# Patient Record
Sex: FEMALE | Race: WHITE | NOT HISPANIC OR LATINO | Employment: OTHER | URBAN - METROPOLITAN AREA
[De-identification: names, ages, dates, MRNs, and addresses within clinical notes are randomized per-mention and may not be internally consistent; named-entity substitution may affect disease eponyms.]

---

## 2017-01-19 ENCOUNTER — HOSPITAL ENCOUNTER (OUTPATIENT)
Dept: LAB | Facility: MEDICAL CENTER | Age: 67
End: 2017-01-19
Attending: SPECIALIST
Payer: MEDICARE

## 2017-01-19 LAB
ALBUMIN SERPL BCP-MCNC: 4.6 G/DL (ref 3.2–4.9)
ALBUMIN/GLOB SERPL: 1.6 G/DL
ALP SERPL-CCNC: 56 U/L (ref 30–99)
ALT SERPL-CCNC: 30 U/L (ref 2–50)
ANION GAP SERPL CALC-SCNC: 7 MMOL/L (ref 0–11.9)
AST SERPL-CCNC: 21 U/L (ref 12–45)
BILIRUB SERPL-MCNC: 0.7 MG/DL (ref 0.1–1.5)
BUN SERPL-MCNC: 18 MG/DL (ref 8–22)
CALCIUM SERPL-MCNC: 10 MG/DL (ref 8.5–10.5)
CHLORIDE SERPL-SCNC: 106 MMOL/L (ref 96–112)
CO2 SERPL-SCNC: 26 MMOL/L (ref 20–33)
CREAT SERPL-MCNC: 1.12 MG/DL (ref 0.5–1.4)
GLOBULIN SER CALC-MCNC: 2.8 G/DL (ref 1.9–3.5)
GLUCOSE SERPL-MCNC: 81 MG/DL (ref 65–99)
POTASSIUM SERPL-SCNC: 4.4 MMOL/L (ref 3.6–5.5)
PROT SERPL-MCNC: 7.4 G/DL (ref 6–8.2)
SODIUM SERPL-SCNC: 139 MMOL/L (ref 135–145)

## 2017-01-19 PROCEDURE — 36415 COLL VENOUS BLD VENIPUNCTURE: CPT

## 2017-01-19 PROCEDURE — 80053 COMPREHEN METABOLIC PANEL: CPT

## 2017-01-30 ENCOUNTER — HOSPITAL ENCOUNTER (OUTPATIENT)
Dept: RADIOLOGY | Facility: MEDICAL CENTER | Age: 67
End: 2017-01-30
Attending: SPECIALIST
Payer: MEDICARE

## 2017-01-30 DIAGNOSIS — C50.911 MALIGNANT NEOPLASM OF RIGHT FEMALE BREAST, UNSPECIFIED SITE OF BREAST: ICD-10-CM

## 2017-01-30 PROCEDURE — 700117 HCHG RX CONTRAST REV CODE 255: Performed by: SPECIALIST

## 2017-01-30 PROCEDURE — A9577 INJ MULTIHANCE: HCPCS | Performed by: SPECIALIST

## 2017-01-30 PROCEDURE — 70553 MRI BRAIN STEM W/O & W/DYE: CPT

## 2017-01-30 RX ADMIN — GADOBENATE DIMEGLUMINE 8 ML: 529 INJECTION, SOLUTION INTRAVENOUS at 11:22

## 2017-02-28 RX ORDER — PRAVASTATIN SODIUM 20 MG
20 TABLET ORAL
Qty: 90 TAB | Refills: 0 | Status: SHIPPED | OUTPATIENT
Start: 2017-02-28 | End: 2017-08-08 | Stop reason: SDUPTHER

## 2017-02-28 NOTE — TELEPHONE ENCOUNTER
Refill done.  Patient needs to re-establish with a new PCP for further refills. Please have them schedule an appointment for a new PCP.  Marbin Webster M.D.

## 2017-02-28 NOTE — TELEPHONE ENCOUNTER
Was the patient seen in the last year in this department? Yes     Does patient have an active prescription for medications requested? No     Received Request Via: Pharmacy     Last seen: 08/17/2016 Alb[place]

## 2017-02-28 NOTE — Clinical Note
February 28, 2017        Lauren Jiang  [address] [place] [address]        Dear Lauren:    .This letter is to inform you we received a medication refill from your Pharmacy. We have refilled this medication. You will need to re-establish with another provider for future refills. Please contact our scheduling department at [phone] to schedule.       If you have any questions or concerns, please don't hesitate to call.        Sincerely,        REFUGIO Drake.    Electronically Signed

## 2017-03-17 DIAGNOSIS — I10 ESSENTIAL HYPERTENSION: ICD-10-CM

## 2017-03-17 RX ORDER — AMLODIPINE BESYLATE AND BENAZEPRIL HYDROCHLORIDE 5; 10 MG/1; MG/1
1 CAPSULE ORAL DAILY
Qty: 90 CAP | Refills: 0 | Status: SHIPPED | OUTPATIENT
Start: 2017-03-17 | End: 2017-03-28 | Stop reason: SDUPTHER

## 2017-03-17 NOTE — TELEPHONE ENCOUNTER
Was the patient seen in the last year in this department? Yes     Does patient have an active prescription for medications requested? No     Received Request Via: Pharmacy     Last seen: 08/17/2016 Albino     Pt contact to schedule with a New PCP

## 2017-03-28 ENCOUNTER — OFFICE VISIT (OUTPATIENT)
Dept: MEDICAL GROUP | Facility: LAB | Age: 67
End: 2017-03-28
Payer: MEDICARE

## 2017-03-28 VITALS
WEIGHT: 169.53 LBS | OXYGEN SATURATION: 97 % | HEART RATE: 80 BPM | DIASTOLIC BLOOD PRESSURE: 70 MMHG | BODY MASS INDEX: 28.25 KG/M2 | SYSTOLIC BLOOD PRESSURE: 112 MMHG | TEMPERATURE: 98.1 F | HEIGHT: 65 IN

## 2017-03-28 DIAGNOSIS — Z85.3 HISTORY OF BREAST CANCER: ICD-10-CM

## 2017-03-28 DIAGNOSIS — R94.4 DECREASED GFR: ICD-10-CM

## 2017-03-28 DIAGNOSIS — I10 ESSENTIAL HYPERTENSION: ICD-10-CM

## 2017-03-28 DIAGNOSIS — H40.10X0 OPEN-ANGLE GLAUCOMA OF RIGHT EYE, UNSPECIFIED GLAUCOMA STAGE, UNSPECIFIED OPEN-ANGLE GLAUCOMA TYPE: ICD-10-CM

## 2017-03-28 DIAGNOSIS — E78.5 HYPERLIPIDEMIA LDL GOAL <100: ICD-10-CM

## 2017-03-28 DIAGNOSIS — Z23 NEED FOR SHINGLES VACCINE: ICD-10-CM

## 2017-03-28 DIAGNOSIS — M85.80 OSTEOPENIA: ICD-10-CM

## 2017-03-28 DIAGNOSIS — R53.83 OTHER FATIGUE: ICD-10-CM

## 2017-03-28 PROCEDURE — 4040F PNEUMOC VAC/ADMIN/RCVD: CPT | Performed by: NURSE PRACTITIONER

## 2017-03-28 PROCEDURE — 1101F PT FALLS ASSESS-DOCD LE1/YR: CPT | Performed by: NURSE PRACTITIONER

## 2017-03-28 PROCEDURE — G8419 CALC BMI OUT NRM PARAM NOF/U: HCPCS | Performed by: NURSE PRACTITIONER

## 2017-03-28 PROCEDURE — 3014F SCREEN MAMMO DOC REV: CPT | Performed by: NURSE PRACTITIONER

## 2017-03-28 PROCEDURE — 1036F TOBACCO NON-USER: CPT | Performed by: NURSE PRACTITIONER

## 2017-03-28 PROCEDURE — 3017F COLORECTAL CA SCREEN DOC REV: CPT | Performed by: NURSE PRACTITIONER

## 2017-03-28 PROCEDURE — G8432 DEP SCR NOT DOC, RNG: HCPCS | Performed by: NURSE PRACTITIONER

## 2017-03-28 PROCEDURE — 99215 OFFICE O/P EST HI 40 MIN: CPT | Performed by: NURSE PRACTITIONER

## 2017-03-28 PROCEDURE — G8482 FLU IMMUNIZE ORDER/ADMIN: HCPCS | Performed by: NURSE PRACTITIONER

## 2017-03-28 RX ORDER — AMLODIPINE BESYLATE AND BENAZEPRIL HYDROCHLORIDE 5; 10 MG/1; MG/1
1 CAPSULE ORAL DAILY
Qty: 90 CAP | Refills: 3 | Status: SHIPPED | OUTPATIENT
Start: 2017-03-28 | End: 2018-04-04 | Stop reason: SDUPTHER

## 2017-03-28 ASSESSMENT — PATIENT HEALTH QUESTIONNAIRE - PHQ9: CLINICAL INTERPRETATION OF PHQ2 SCORE: 0

## 2017-03-28 ASSESSMENT — PAIN SCALES - GENERAL: PAINLEVEL: NO PAIN

## 2017-03-28 NOTE — ASSESSMENT & PLAN NOTE
Chronic issue.  Due for DEXA.  Taking calcium / vit D.    Last DEXA scan completed in September 2015.   Lumbar spine T score -2.3  Left hip T score -1.8  No broken bones since childhood.

## 2017-03-28 NOTE — ASSESSMENT & PLAN NOTE
Chronic issue.  Taking amlodipine / beazepril 5/10.  No hx of MI or stroke.  Denies leg swelling.

## 2017-03-28 NOTE — ASSESSMENT & PLAN NOTE
Component      Latest Ref Rng 9/19/2014 11/7/2015 5/13/2016 8/25/2016           7:25 AM 10:27 AM  9:44 AM 10:06 AM   GFR If African American      >60 mL/min/1.73 m 2 >60 >60 >60 57 (A)   GFR If Non African American      >60 mL/min/1.73 m 2 57 (A) >60 58 (A) 47 (A)     Component      Latest Ref Rng 9/28/2016 11/22/2016 1/19/2017           9:45 AM  9:24 AM 10:53 AM   GFR If African American      >60 mL/min/1.73 m 2 >60 >60 59 (A)   GFR If Non African American      >60 mL/min/1.73 m 2 >60 60 49 (A)     Mentions undx UTI a few years ago.  Abdominal US done and normal.  + pmhx HTN.      Component      Latest Ref Rng 11/7/2015 5/13/2016 8/25/2016 9/28/2016 11/22/2016          10:27 AM  9:44 AM 10:06 AM  9:45 AM  9:24 AM   Glucose      65 - 99 mg/dL 91 94 88 84 76   Bun      8 - 22 mg/dL 18 17 19 17 17   Creatinine      0.50 - 1.40 mg/dL 0.91 0.97 1.16 0.93 0.94   Calcium      8.5 - 10.5 mg/dL 10.4 10.4 10.4 9.5 9.9     Component      Latest Ref Rng 1/19/2017          10:53 AM   Glucose      65 - 99 mg/dL 81   Bun      8 - 22 mg/dL 18   Creatinine      0.50 - 1.40 mg/dL 1.12   Calcium      8.5 - 10.5 mg/dL 10.0

## 2017-03-28 NOTE — PROGRESS NOTES
Chief Complaint   Patient presents with   • Establish Care       HPI  Lauren is a 65 yo female here as a new patient in our office, previous pt of Nina Posdaa.  She has a medical history of osteopenia, hyperlipidemia, breast cancer, glaucoma, hypertension and a decreased GFR. No known chronic kidney disease. Rare use of NSAIDs. She is feeling well. She is also followed by general surgery and oncology. She occasionally sees dermatology but no one consistent. Also followed by ophthalmology, Dr. Garcia, regularly.  She is  with 2 grown children and one granddaughter. She is a retired nurse. She quit smoking 20 years ago. She drinks one glass of wine at night. She exercises by walking. She enjoys quilting.     #1-Osteopenia  Chronic issue.  Due for DEXA in the fall.  Taking calcium / vit D.    Last DEXA scan completed in September 2015.   Lumbar spine T score -2.3  Left hip T score -1.8  No broken bones since childhood.    #2-Hyperlipidemia LDL goal <100  Chronic issue.  Last LP was summer, 2016.  Taking pravastatin 20 mg nightly without side effects.  No hx of MI or stroke.  Had full cardiac w/u through [place] in her 50's - negative.    #3-History of breast cancer  Dx age 62, right breast . Followed by Dr. Jay and Britany yearly.  Hx of right mastectomy.  Currently taking anastrozole.      #4-Glaucoma  Chronic issue.  Followed by Dr. Garcia every 6 months.  No gtts.  Reports pressure to is stable.  Right eye only.      #5-Fatigue  Issue since starting anastozole.      #6-Essential hypertension  Chronic issue.  Taking amlodipine / beazepril 5/10.  No hx of MI or stroke.  Denies leg swelling.      #7-Decreased GFR  Component      Latest Ref Rng 9/19/2014 11/7/2015 5/13/2016 8/25/2016           7:25 AM 10:27 AM  9:44 AM 10:06 AM   GFR If African American      >60 mL/min/1.73 m 2 >60 >60 >60 57 (A)   GFR If Non African American      >60 mL/min/1.73 m 2 57 (A) >60 58 (A) 47 (A)     Component      Latest Ref Rng  "9/28/2016 11/22/2016 1/19/2017           9:45 AM  9:24 AM 10:53 AM   GFR If African American      >60 mL/min/1.73 m 2 >60 >60 59 (A)   GFR If Non African American      >60 mL/min/1.73 m 2 >60 60 49 (A)     Mentions undx UTI a few years ago.  Abdominal US done and normal.  + pmhx HTN.      Component      Latest Ref Rng 11/7/2015 5/13/2016 8/25/2016 9/28/2016 11/22/2016          10:27 AM  9:44 AM 10:06 AM  9:45 AM  9:24 AM   Glucose      65 - 99 mg/dL 91 94 88 84 76   Bun      8 - 22 mg/dL 18 17 19 17 17   Creatinine      0.50 - 1.40 mg/dL 0.91 0.97 1.16 0.93 0.94   Calcium      8.5 - 10.5 mg/dL 10.4 10.4 10.4 9.5 9.9     Component      Latest Ref Rng 1/19/2017          10:53 AM   Glucose      65 - 99 mg/dL 81   Bun      8 - 22 mg/dL 18   Creatinine      0.50 - 1.40 mg/dL 1.12   Calcium      8.5 - 10.5 mg/dL 10.0       Family History   Problem Relation Age of Onset   • Hypertension Father    • Heart Disease Father    • Cancer Sister      breast / possible uterine also (Dr. Mendoza)   • Hypertension Mother    • Lung Disease Mother      smoker   • Arthritis Mother      RA   • Diabetes Maternal Uncle      type I   • Cancer Maternal Grandmother      Colon   • Diabetes Maternal Grandfather      type II     Past Surgical History   Procedure Laterality Date   • Hysterectomy laparoscopy  51 y/o     cervix and bilat ovaries removed.   • Mastectomy  age 62     right mastectomy   • Tonsillectomy and adenoidectomy  5 y/o   • Knee arthroscopy  58 y/o     tripped over baby gate       ROS:   Denies nausea, vomiting, diarrhea, abdominal pain, chest pain, shortness of breath, leg swelling, unintentional weight loss, depression.    Exam:  Blood pressure 112/70, pulse 80, temperature 36.7 °C (98.1 °F), height 1.651 m (5' 5\"), weight 76.9 kg (169 lb 8.5 oz), SpO2 97 %.  Gen. appears healthy in no distress   Skin appropriate for sex and age   HEENT unremarkable   Neck no adenopathy, no nodules or masses palpable   Lungs clear   Heart regular "   Neuro gait and station normal   Psych appropriate      A/P:  1. History of breast cancer  -Followed closely by oncology and general surgery. Mammogram is due in May.    2. Osteopenia  -Currently taking calcium, vitamin D and performing weightbearing exercises. We'll recheck a bone density in the fall. Requested her medical records from [facility].    3. Hyperlipidemia LDL goal <100  -Recommend updated lab work this summer. Continue statin therapy, exercise and high fiber diet.  - LIPID PROFILE; Future    4. Open-angle glaucoma of right eye, unspecified glaucoma stage, unspecified open-angle glaucoma type  -Denies any eye issues today. Followed closely by Dr. Garcia.    5. Other fatigue  -Stable. Encourage daily exercise.    6. Essential hypertension  -Stable. Refilled her medication. Follow-up as needed.  - amlodipine-benazepril (LOTREL) 5-10 MG per capsule; Take 1 Cap by mouth every day.  Dispense: 90 Cap; Refill: 3    7. Decreased GFR  -Discussed likely from dehydration as BUN and creatinine have been normal. Encouraged high water intake prior to next lab work.    8. Need for shingles vaccine  - NON SPECIFIED; Zostivax  Dispense: 1 Each; Refill: 0    Total face to face time 40 minutes of which over 50% of this visit is spent in counseling, education and outlining a plan of treatment and coordination of care for the above conditions. This included but was not limited to discussion of medication options and potential risks related to the medications, referral and specialty care options. All patient questions were answered

## 2017-03-28 NOTE — Clinical Note
[facility]  LUCIANA HerPRaphaelN.  [address]  [place] [address]  Fax: [phone]   Authorization for Release/Disclosure of   Protected Health Information   Name: SHANAE MACIEL : 1950 SSN: [id number]   Address: [address] [place] [id number] Phone:    [phone] (home)    I authorize the entity listed below to release/disclose the PHI below to:   [facility]/GRACE Her. and CHIDI Her   Provider or Entity Name:  [facility]   Address   City, [facility]   Phone:      Fax:     Reason for request: continuity of care   Information to be released:    [  ] LAST COLONOSCOPY,  including any PATH REPORT and follow-up  [  ] LAST FIT/COLOGUARD RESULT [  ] LAST DEXA  [  ] LAST MAMMOGRAM  [  ] LAST PAP  [  ] LAST LABS [  ] RETINA EXAM REPORT  [  ] IMMUNIZATION RECORDS  [  ] Release all info      [  ] Check here and initial the line next to each item to release ALL health information INCLUDING  _____ Care and treatment for drug and / or alcohol abuse  _____ HIV testing, infection status, or AIDS  _____ Genetic Testing    DATES OF SERVICE OR TIME PERIOD TO BE DISCLOSED: _____________  I understand and acknowledge that:  * This Authorization may be revoked at any time by you in writing, except if your health information has already been used or disclosed.  * Your health information that will be used or disclosed as a result of you signing this authorization could be re-disclosed by the recipient. If this occurs, your re-disclosed health information may no longer be protected by State or Federal laws.  * You may refuse to sign this Authorization. Your refusal will not affect your ability to obtain treatment.  * This Authorization becomes effective upon signing and will  on (date) __________.      If no date is indicated, this Authorization will  one (1) year from the signature date.    Name: Shanae  Apolinar    Signature:   Date:     3/28/2017       PLEASE FAX REQUESTED RECORDS BACK TO: [phone]

## 2017-03-28 NOTE — ASSESSMENT & PLAN NOTE
Chronic issue.  Followed by Dr. Garcia every 6 months.  No gtts.  Reports pressure to is stable.  Right only.

## 2017-03-28 NOTE — ASSESSMENT & PLAN NOTE
Chronic issue.  Last LP was summer, 2016.  Taking pravastatin 20 mg nightly without side effects.  No hx of MI or stroke.  Had full cardiac w/u through [facility] in her 50's - negative.

## 2017-03-28 NOTE — ASSESSMENT & PLAN NOTE
Dx age 62, right breast . Followed by Dr. Jay and Britany yearly.  Hx of right mastectomy.  Currently taking anastrozole.

## 2017-05-23 ENCOUNTER — HOSPITAL ENCOUNTER (OUTPATIENT)
Dept: RADIOLOGY | Facility: MEDICAL CENTER | Age: 67
End: 2017-05-23
Attending: SURGERY
Payer: MEDICARE

## 2017-05-23 DIAGNOSIS — Z85.3 PERSONAL HISTORY OF MALIGNANT NEOPLASM OF BREAST: ICD-10-CM

## 2017-05-23 PROCEDURE — G0279 TOMOSYNTHESIS, MAMMO: HCPCS

## 2017-06-17 ENCOUNTER — PATIENT OUTREACH (OUTPATIENT)
Dept: HEALTH INFORMATION MANAGEMENT | Facility: OTHER | Age: 67
End: 2017-06-17

## 2017-08-08 RX ORDER — PRAVASTATIN SODIUM 20 MG
20 TABLET ORAL
Qty: 90 TAB | Refills: 2 | Status: SHIPPED | OUTPATIENT
Start: 2017-08-08 | End: 2018-08-09 | Stop reason: SDUPTHER

## 2017-08-16 ENCOUNTER — APPOINTMENT (OUTPATIENT)
Dept: RADIOLOGY | Facility: MEDICAL CENTER | Age: 67
End: 2017-08-16
Attending: HOSPITALIST
Payer: MEDICARE

## 2017-08-16 ENCOUNTER — HOSPITAL ENCOUNTER (OUTPATIENT)
Facility: MEDICAL CENTER | Age: 67
End: 2017-08-16
Attending: EMERGENCY MEDICINE | Admitting: INTERNAL MEDICINE
Payer: MEDICARE

## 2017-08-16 ENCOUNTER — RESOLUTE PROFESSIONAL BILLING HOSPITAL PROF FEE (OUTPATIENT)
Dept: HOSPITALIST | Facility: MEDICAL CENTER | Age: 67
End: 2017-08-16
Payer: MEDICARE

## 2017-08-16 ENCOUNTER — APPOINTMENT (OUTPATIENT)
Dept: RADIOLOGY | Facility: MEDICAL CENTER | Age: 67
End: 2017-08-16
Attending: EMERGENCY MEDICINE
Payer: MEDICARE

## 2017-08-16 VITALS
TEMPERATURE: 97.8 F | OXYGEN SATURATION: 95 % | HEIGHT: 65 IN | DIASTOLIC BLOOD PRESSURE: 78 MMHG | SYSTOLIC BLOOD PRESSURE: 130 MMHG | BODY MASS INDEX: 27.99 KG/M2 | WEIGHT: 167.99 LBS | RESPIRATION RATE: 18 BRPM | HEART RATE: 78 BPM

## 2017-08-16 DIAGNOSIS — R07.9 CHEST PAIN IN ADULT: ICD-10-CM

## 2017-08-16 PROBLEM — N18.2 CKD (CHRONIC KIDNEY DISEASE), STAGE II: Status: ACTIVE | Noted: 2017-08-16

## 2017-08-16 PROBLEM — R07.89 ATYPICAL CHEST PAIN: Status: ACTIVE | Noted: 2017-08-16

## 2017-08-16 PROBLEM — I10 HTN (HYPERTENSION): Status: ACTIVE | Noted: 2017-08-16

## 2017-08-16 PROBLEM — R07.89 ATYPICAL CHEST PAIN: Status: RESOLVED | Noted: 2017-08-16 | Resolved: 2017-08-16

## 2017-08-16 LAB
ALBUMIN SERPL BCP-MCNC: 4.5 G/DL (ref 3.2–4.9)
ALBUMIN/GLOB SERPL: 1.6 G/DL
ALP SERPL-CCNC: 68 U/L (ref 30–99)
ALT SERPL-CCNC: 73 U/L (ref 2–50)
ANION GAP SERPL CALC-SCNC: 10 MMOL/L (ref 0–11.9)
AST SERPL-CCNC: 43 U/L (ref 12–45)
BASOPHILS # BLD AUTO: 0.7 % (ref 0–1.8)
BASOPHILS # BLD: 0.05 K/UL (ref 0–0.12)
BILIRUB SERPL-MCNC: 0.5 MG/DL (ref 0.1–1.5)
BUN SERPL-MCNC: 20 MG/DL (ref 8–22)
CALCIUM SERPL-MCNC: 9.5 MG/DL (ref 8.4–10.2)
CHLORIDE SERPL-SCNC: 106 MMOL/L (ref 96–112)
CO2 SERPL-SCNC: 25 MMOL/L (ref 20–33)
CREAT SERPL-MCNC: 1.14 MG/DL (ref 0.5–1.4)
EKG IMPRESSION: NORMAL
EOSINOPHIL # BLD AUTO: 0.32 K/UL (ref 0–0.51)
EOSINOPHIL NFR BLD: 4.4 % (ref 0–6.9)
ERYTHROCYTE [DISTWIDTH] IN BLOOD BY AUTOMATED COUNT: 39.7 FL (ref 35.9–50)
GFR SERPL CREATININE-BSD FRML MDRD: 48 ML/MIN/1.73 M 2
GLOBULIN SER CALC-MCNC: 2.8 G/DL (ref 1.9–3.5)
GLUCOSE SERPL-MCNC: 105 MG/DL (ref 65–99)
HCT VFR BLD AUTO: 45.3 % (ref 37–47)
HGB BLD-MCNC: 15.1 G/DL (ref 12–16)
IMM GRANULOCYTES # BLD AUTO: 0.08 K/UL (ref 0–0.11)
IMM GRANULOCYTES NFR BLD AUTO: 1.1 % (ref 0–0.9)
LIPASE SERPL-CCNC: 35 U/L (ref 7–58)
LYMPHOCYTES # BLD AUTO: 1.68 K/UL (ref 1–4.8)
LYMPHOCYTES NFR BLD: 23.3 % (ref 22–41)
MCH RBC QN AUTO: 29.3 PG (ref 27–33)
MCHC RBC AUTO-ENTMCNC: 33.3 G/DL (ref 33.6–35)
MCV RBC AUTO: 88 FL (ref 81.4–97.8)
MONOCYTES # BLD AUTO: 0.64 K/UL (ref 0–0.85)
MONOCYTES NFR BLD AUTO: 8.9 % (ref 0–13.4)
NEUTROPHILS # BLD AUTO: 4.43 K/UL (ref 2–7.15)
NEUTROPHILS NFR BLD: 61.6 % (ref 44–72)
NRBC # BLD AUTO: 0 K/UL
NRBC BLD AUTO-RTO: 0 /100 WBC
PLATELET # BLD AUTO: 245 K/UL (ref 164–446)
PMV BLD AUTO: 8.8 FL (ref 9–12.9)
POTASSIUM SERPL-SCNC: 3.9 MMOL/L (ref 3.6–5.5)
PROT SERPL-MCNC: 7.3 G/DL (ref 6–8.2)
RBC # BLD AUTO: 5.15 M/UL (ref 4.2–5.4)
SODIUM SERPL-SCNC: 141 MMOL/L (ref 135–145)
TROPONIN I SERPL-MCNC: <0.02 NG/ML (ref 0–0.04)
TSH SERPL DL<=0.005 MIU/L-ACNC: 4.04 UIU/ML (ref 0.35–5.5)
WBC # BLD AUTO: 7.2 K/UL (ref 4.8–10.8)

## 2017-08-16 PROCEDURE — 83690 ASSAY OF LIPASE: CPT

## 2017-08-16 PROCEDURE — 84443 ASSAY THYROID STIM HORMONE: CPT

## 2017-08-16 PROCEDURE — 700111 HCHG RX REV CODE 636 W/ 250 OVERRIDE (IP)

## 2017-08-16 PROCEDURE — 700102 HCHG RX REV CODE 250 W/ 637 OVERRIDE(OP): Performed by: HOSPITALIST

## 2017-08-16 PROCEDURE — G0378 HOSPITAL OBSERVATION PER HR: HCPCS

## 2017-08-16 PROCEDURE — A9270 NON-COVERED ITEM OR SERVICE: HCPCS | Performed by: EMERGENCY MEDICINE

## 2017-08-16 PROCEDURE — 94760 N-INVAS EAR/PLS OXIMETRY 1: CPT

## 2017-08-16 PROCEDURE — A9270 NON-COVERED ITEM OR SERVICE: HCPCS | Performed by: HOSPITALIST

## 2017-08-16 PROCEDURE — 93005 ELECTROCARDIOGRAM TRACING: CPT | Performed by: EMERGENCY MEDICINE

## 2017-08-16 PROCEDURE — A9502 TC99M TETROFOSMIN: HCPCS

## 2017-08-16 PROCEDURE — 84484 ASSAY OF TROPONIN QUANT: CPT

## 2017-08-16 PROCEDURE — 700102 HCHG RX REV CODE 250 W/ 637 OVERRIDE(OP): Performed by: EMERGENCY MEDICINE

## 2017-08-16 PROCEDURE — 71020 DX-CHEST-2 VIEWS: CPT

## 2017-08-16 PROCEDURE — 85025 COMPLETE CBC W/AUTO DIFF WBC: CPT

## 2017-08-16 PROCEDURE — 80053 COMPREHEN METABOLIC PANEL: CPT

## 2017-08-16 PROCEDURE — 99236 HOSP IP/OBS SAME DATE HI 85: CPT | Performed by: INTERNAL MEDICINE

## 2017-08-16 PROCEDURE — 36415 COLL VENOUS BLD VENIPUNCTURE: CPT

## 2017-08-16 PROCEDURE — 99285 EMERGENCY DEPT VISIT HI MDM: CPT

## 2017-08-16 RX ORDER — M-VIT,TX,IRON,MINS/CALC/FOLIC 27MG-0.4MG
1 TABLET ORAL DAILY
COMMUNITY
End: 2019-08-14

## 2017-08-16 RX ORDER — PRAVASTATIN SODIUM 20 MG
20 TABLET ORAL
Status: DISCONTINUED | OUTPATIENT
Start: 2017-08-16 | End: 2017-08-16 | Stop reason: HOSPADM

## 2017-08-16 RX ORDER — AMLODIPINE BESYLATE AND BENAZEPRIL HYDROCHLORIDE 5; 10 MG/1; MG/1
1 CAPSULE ORAL DAILY
Status: DISCONTINUED | OUTPATIENT
Start: 2017-08-16 | End: 2017-08-16

## 2017-08-16 RX ORDER — AMLODIPINE BESYLATE 5 MG/1
5 TABLET ORAL
Status: DISCONTINUED | OUTPATIENT
Start: 2017-08-16 | End: 2017-08-16 | Stop reason: HOSPADM

## 2017-08-16 RX ORDER — NITROGLYCERIN 0.4 MG/1
0.4 TABLET SUBLINGUAL
Status: DISCONTINUED | OUTPATIENT
Start: 2017-08-16 | End: 2017-08-16 | Stop reason: HOSPADM

## 2017-08-16 RX ORDER — REGADENOSON 0.08 MG/ML
INJECTION, SOLUTION INTRAVENOUS
Status: COMPLETED
Start: 2017-08-16 | End: 2017-08-16

## 2017-08-16 RX ORDER — POLYETHYLENE GLYCOL 3350 17 G/17G
1 POWDER, FOR SOLUTION ORAL
Status: DISCONTINUED | OUTPATIENT
Start: 2017-08-16 | End: 2017-08-16 | Stop reason: HOSPADM

## 2017-08-16 RX ORDER — ASPIRIN 81 MG/1
324 TABLET, CHEWABLE ORAL ONCE
Status: COMPLETED | OUTPATIENT
Start: 2017-08-16 | End: 2017-08-16

## 2017-08-16 RX ORDER — SODIUM CHLORIDE 9 MG/ML
1000 INJECTION, SOLUTION INTRAVENOUS ONCE
Status: DISCONTINUED | OUTPATIENT
Start: 2017-08-16 | End: 2017-08-16 | Stop reason: HOSPADM

## 2017-08-16 RX ORDER — MORPHINE SULFATE 4 MG/ML
2-4 INJECTION, SOLUTION INTRAMUSCULAR; INTRAVENOUS
Status: DISCONTINUED | OUTPATIENT
Start: 2017-08-16 | End: 2017-08-16 | Stop reason: HOSPADM

## 2017-08-16 RX ORDER — BISACODYL 10 MG
10 SUPPOSITORY, RECTAL RECTAL
Status: DISCONTINUED | OUTPATIENT
Start: 2017-08-16 | End: 2017-08-16 | Stop reason: HOSPADM

## 2017-08-16 RX ORDER — ANASTROZOLE 1 MG/1
1 TABLET ORAL DAILY
Status: DISCONTINUED | OUTPATIENT
Start: 2017-08-16 | End: 2017-08-16 | Stop reason: HOSPADM

## 2017-08-16 RX ORDER — AMOXICILLIN 250 MG
2 CAPSULE ORAL 2 TIMES DAILY
Status: DISCONTINUED | OUTPATIENT
Start: 2017-08-16 | End: 2017-08-16 | Stop reason: HOSPADM

## 2017-08-16 RX ORDER — MULTIVIT-MIN/IRON/FOLIC ACID/K 18-600-40
1 CAPSULE ORAL EVERY EVENING
COMMUNITY
End: 2019-08-14

## 2017-08-16 RX ORDER — BENAZEPRIL HYDROCHLORIDE 10 MG/1
10 TABLET ORAL
Status: DISCONTINUED | OUTPATIENT
Start: 2017-08-16 | End: 2017-08-16 | Stop reason: HOSPADM

## 2017-08-16 RX ADMIN — DOCUSATE SODIUM AND SENNOSIDES 2 TABLET: 8.6; 5 TABLET, FILM COATED ORAL at 09:14

## 2017-08-16 RX ADMIN — ASPIRIN 81 MG 243 MG: 81 TABLET ORAL at 04:47

## 2017-08-16 RX ADMIN — BENAZEPRIL HYDROCHLORIDE 10 MG: 10 TABLET, FILM COATED ORAL at 09:14

## 2017-08-16 RX ADMIN — REGADENOSON 0.4 MG: 0.08 INJECTION, SOLUTION INTRAVENOUS at 12:28

## 2017-08-16 RX ADMIN — ANASTROZOLE 1 MG: 1 TABLET, COATED ORAL at 09:14

## 2017-08-16 RX ADMIN — AMLODIPINE BESYLATE 5 MG: 5 TABLET ORAL at 09:14

## 2017-08-16 ASSESSMENT — LIFESTYLE VARIABLES
EVER HAD A DRINK FIRST THING IN THE MORNING TO STEADY YOUR NERVES TO GET RID OF A HANGOVER: NO
EVER FELT BAD OR GUILTY ABOUT YOUR DRINKING: NO
HAVE PEOPLE ANNOYED YOU BY CRITICIZING YOUR DRINKING: NO
AVERAGE NUMBER OF DAYS PER WEEK YOU HAVE A DRINK CONTAINING ALCOHOL: 5
EVER_SMOKED: NEVER
HAVE YOU EVER FELT YOU SHOULD CUT DOWN ON YOUR DRINKING: NO
HOW MANY TIMES IN THE PAST YEAR HAVE YOU HAD 5 OR MORE DRINKS IN A DAY: 0
TOTAL SCORE: 0
ON A TYPICAL DAY WHEN YOU DRINK ALCOHOL HOW MANY DRINKS DO YOU HAVE: 1
TOTAL SCORE: 0
DO YOU DRINK ALCOHOL: YES
CONSUMPTION TOTAL: NEGATIVE
TOTAL SCORE: 0
EVER_SMOKED: YES

## 2017-08-16 ASSESSMENT — PATIENT HEALTH QUESTIONNAIRE - PHQ9
2. FEELING DOWN, DEPRESSED, IRRITABLE, OR HOPELESS: NOT AT ALL
1. LITTLE INTEREST OR PLEASURE IN DOING THINGS: NOT AT ALL
SUM OF ALL RESPONSES TO PHQ QUESTIONS 1-9: 0
SUM OF ALL RESPONSES TO PHQ9 QUESTIONS 1 AND 2: 0
2. FEELING DOWN, DEPRESSED, IRRITABLE, OR HOPELESS: NOT AT ALL
SUM OF ALL RESPONSES TO PHQ9 QUESTIONS 1 AND 2: 0
SUM OF ALL RESPONSES TO PHQ QUESTIONS 1-9: 0
1. LITTLE INTEREST OR PLEASURE IN DOING THINGS: NOT AT ALL

## 2017-08-16 ASSESSMENT — PAIN SCALES - GENERAL: PAINLEVEL_OUTOF10: 0

## 2017-08-16 NOTE — IP AVS SNAPSHOT
" Home Care Instructions                                                                                                                  Name:Lauren Jiang  Medical Record Number:[id number]  CSN: [id number]    YOB: 1950   Age: 67 y.o.  Sex: female  HT:1.651 m (5' 5\") WT: 76.2 kg (167 lb 15.9 oz)          Admit Date: 8/16/2017     Discharge Date:   Today's Date: 8/16/2017  Attending Doctor:  Luz Maria Portillo M.D.                  Allergies:  Review of patient's allergies indicates no known allergies.            Discharge Instructions       Discharge Instructions    Discharged to home by car with relative. Discharged via walking, hospital escort: Refused.  Special equipment needed: Not Applicable    Be sure to schedule a follow-up appointment with your primary care doctor or any specialists as instructed.     Discharge Plan:   Diet Plan: Discussed  Activity Level: Discussed  Confirmed Follow up Appointment: Appointment Scheduled  Confirmed Symptoms Management: Discussed  Medication Reconciliation Updated: Yes  Influenza Vaccine Indication: Not indicated: Previously immunized this influenza season and > 8 years of age    I understand that a diet low in cholesterol, fat, and sodium is recommended for good health. Unless I have been given specific instructions below for another diet, I accept this instruction as my diet prescription.   Other diet: regular    Special Instructions: None    · Is patient discharged on Warfarin / Coumadin?   No     · Is patient Post Blood Transfusion?  No    Chest Pain, Nonspecific  It is often hard to give a specific diagnosis for the cause of chest pain. There is always a chance that your pain could be related to something serious, like a heart attack or a blood clot in the lungs. You need to follow up with your caregiver for further evaluation. More lab tests or other studies such as X-rays, electrocardiography, stress testing, or cardiac imaging may be needed to find the cause of " your pain.  Most of the time, nonspecific chest pain improves within 2 to 3 days with rest and mild pain medicine. For the next few days, avoid physical exertion or activities that bring on pain. Do not smoke. Avoid drinking alcohol. Call your caregiver for routine follow-up as advised.   SEEK IMMEDIATE MEDICAL CARE IF:  · You develop increased chest pain or pain that radiates to the arm, neck, jaw, back, or abdomen.   · You develop shortness of breath, increased coughing, or you start coughing up blood.   · You have severe back or abdominal pain, nausea, or vomiting.   · You develop severe weakness, fainting, fever, or chills.   Document Released: 12/18/2006 Document Revised: 03/11/2013 Document Reviewed: 06/06/2008  [employer]® Patient Information ©2013 [employer].    Depression / Suicide Risk    As you are discharged from this [facility] facility, it is important to learn how to keep safe from harming yourself.    Recognize the warning signs:  · Abrupt changes in personality, positive or negative- including increase in energy   · Giving away possessions  · Change in eating patterns- significant weight changes-  positive or negative  · Change in sleeping patterns- unable to sleep or sleeping all the time   · Unwillingness or inability to communicate  · Depression  · Unusual sadness, discouragement and loneliness  · Talk of wanting to die  · Neglect of personal appearance   · Rebelliousness- reckless behavior  · Withdrawal from people/activities they love  · Confusion- inability to concentrate     If you or a loved one observes any of these behaviors or has concerns about self-harm, here's what you can do:  · Talk about it- your feelings and reasons for harming yourself  · Remove any means that you might use to hurt yourself (examples: pills, rope, extension cords, firearm)  · Get professional help from the community (Mental Health, Substance Abuse, psychological counseling)  · Do not be alone:Call your Safe  Contact- someone whom you trust who will be there for you.  · Call your local CRISIS HOTLINE 872-1143 or 589-342-9807  · Call your local Children's Mobile Crisis Response Team [place] (204) 432-7902 or www.[employer]  · Call the toll free National Suicide Prevention Hotlines   · National Suicide Prevention Lifeline 844-232-IVDQ (7984)  · Yes[employer] Line Network 800-SUICIDE (432-8747)        Your appointments     Aug 22, 2017  3:00 PM   Established Patient with SHERMAN Estrada   [facility]    [address]  [place] [address] [phone]           You will be receiving a confirmation call a few days before your appointment from our automated call confirmation system.                 Discharge Medication Instructions:    Below are the medications your physician expects you to take upon discharge:    Review all your home medications and newly ordered medications with your doctor and/or pharmacist. Follow medication instructions as directed by your doctor and/or pharmacist.    Please keep your medication list with you and share with your physician.               Medication List      CONTINUE taking these medications        Instructions    Morning Afternoon Evening Bedtime    amlodipine-benazepril 5-10 MG per capsule   Commonly known as:  LOTREL        Take 1 Cap by mouth every day.   Dose:  1 Cap                        anastrozole 1 MG Tabs   Last time this was given:  1 mg on 8/16/2017  9:14 AM   Commonly known as:  ARIMIDEX        Take 1 mg by mouth every day.   Dose:  1 mg                        aspirin EC 81 MG Tbec   Commonly known as:  ECOTRIN        Take 81 mg by mouth as needed (For chest pain).   Dose:  81 mg                        calcium carbonate 500 MG Tabs   Commonly known as:  OS-OMERO 500        Take 1,250 mg by mouth 2 times a day, with meals.   Dose:  1250 mg                        CO Q 10 PO        Take 1 Cap by mouth every bedtime.   Dose:  1  Cap                        pravastatin 20 MG Tabs   Commonly known as:  PRAVACHOL        Take 1 Tab by mouth every bedtime.   Dose:  20 mg                        therapeutic multivitamin-minerals Tabs        Take 1 Tab by mouth every day.   Dose:  1 Tab                        Vitamin D 2000 UNITS Caps        Take 1 Cap by mouth every evening.   Dose:  1 Cap                                Instructions           Diet / Nutrition:    Follow any diet instructions given to you by your doctor or the dietician, including how much salt (sodium) you are allowed each day.    If you are overweight, talk to your doctor about a weight reduction plan.    Activity:    Remain physically active following your doctor's instructions about exercise and activity.    Rest often.     Any time you become even a little tired or short of breath, SIT DOWN and rest.    Worsening Symptoms:    Report any of the following signs and symptoms to the doctor's office immediately:    *Pain of jaw, arm, or neck  *Chest pain not relieved by medication                               *Dizziness or loss of consciousness  *Difficulty breathing even when at rest   *More tired than usual                                       *Bleeding drainage or swelling of surgical site  *Swelling of feet, ankles, legs or stomach                 *Fever (>100ºF)  *Pink or blood tinged sputum  *Weight gain (3lbs/day or 5lbs /week)           *Shock from internal defibrillator (if applicable)  *Palpitations or irregular heartbeats                *Cool and/or numb extremities    Stroke Awareness    Common Risk Factors for Stroke include:    Age  Atrial Fibrillation  Carotid Artery Stenosis  Diabetes Mellitus  Excessive alcohol consumption  High blood pressure  Overweight   Physical inactivity  Smoking    Warning signs and symptoms of a stroke include:    *Sudden numbness or weakness of the face, arm or leg (especially on one side of the body).  *Sudden confusion, trouble speaking  or understanding.  *Sudden trouble seeing in one or both eyes.  *Sudden trouble walking, dizziness, loss of balance or coordination.Sudden severe headache with no known cause.    It is very important to get treatment quickly when a stroke occurs. If you experience any of the above warning signs, call 911 immediately.                   Disclaimer         Quit Smoking / Tobacco Use:    I understand the use of any tobacco products increases my chance of suffering from future heart disease or stroke and could cause other illnesses which may shorten my life. Quitting the use of tobacco products is the single most important thing I can do to improve my health. For further information on smoking / tobacco cessation call a Toll Free Quit Line at 1-941.951.2190 (*National Cancer [place]) or 1-356.774.1157 (American Lung Association) or you can access the web based program at www.lungusa.org.    [place] Tobacco Users Help Line:  (687) 291-4310       Toll Free: 1-784.233.4021  Quit Tobacco Program [facility] Management Services (357)366-6308    Crisis Hotline:    [place] Crisis Hotline:  4-838-QNAUELZ or 1-855.861.9338    [place] Crisis Hotline:    1-738.125.6778 or 082-863-7797    Discharge Survey:   Thank you for choosing [facility]. We hope we did everything we could to make your hospital stay a pleasant one. You may be receiving a phone survey and we would appreciate your time and participation in answering the questions. Your input is very valuable to us in our efforts to improve our service to our patients and their families.        My signature on this form indicates that:    1. I have reviewed and understand the above information.  2. My questions regarding this information have been answered to my satisfaction.  3. I have formulated a plan with my discharge nurse to obtain my prescribed medications for home.                  Disclaimer         __________________________________                     __________        ________                       Patient Signature                                                 Date                    Time

## 2017-08-16 NOTE — IP AVS SNAPSHOT
8/16/2017    Lauren Jiang  [address] [address]    Dear Lauren:    Critical access hospital wants to ensure your discharge home is safe and you or your loved ones have had all of your questions answered regarding your care after you leave the hospital.    Below is a list of resources and contact information should you have any questions regarding your hospital stay, follow-up instructions, or active medical symptoms.    Questions or Concerns Regarding… Contact   Medical Questions Related to Your Discharge  (7 days a week, 8am-5pm) Contact a Nurse Care Coordinator   [phone]   Medical Questions Not Related to Your Discharge  (24 hours a day / 7 days a week)  Contact the Nurse Health Line   [phone]    Medications or Discharge Instructions Refer to your discharge packet   or contact your [facility] Primary Care Provider   [phone]   Follow-up Appointment(s) Schedule your appointment via [employer]   or contact Scheduling [phone]   Billing Review your statement via [employer]  or contact Billing [phone]   Medical Records Review your records via [employer]   or contact Medical Records [phone]     You may receive a telephone call within two days of discharge. This call is to make certain you understand your discharge instructions and have the opportunity to have any questions answered. You can also easily access your medical information, test results and upcoming appointments via the [employer] free online health management tool. You can learn more and sign up at [employer]. For assistance setting up your [employer] account, please call [phone].    Once again, we want to ensure your discharge home is safe and that you have a clear understanding of any next steps in your care. If you have any questions or concerns, please do not hesitate to contact us, we are here for you. Thank you for choosing [facility] for your healthcare needs.    Sincerely,    Your [facility] Healthcare Team

## 2017-08-16 NOTE — PROGRESS NOTES
Patient stable, has been sitting on chair, no complaints or concerns. Ready for discharge, waiting for her .

## 2017-08-16 NOTE — ED PROVIDER NOTES
"ED Provider Note    CHIEF COMPLAINT  Chief Complaint   Patient presents with   • Chest Pain     burning/heaviness, left sided        [facility]    Primary care provider: CHIDI Her   History obtained from: Pt   History limited by: None     Lauren Jiang is a 67 y.o. female who presents to the ED complaining of left sided chest pain that woke her up from sleep at around 3:00 this morning. She describes the pain as heaviness and burning. She tried getting up and moving around without any improvement. She did take a baby aspirin. She denies any associated shortness of breath/nausea/vomiting/dizziness or lightheadedness. She reports hot flashes due to her breast cancer treatment so is unsure if her diaphoresis was due to that or due to the chest pain. She denies pain anywhere else or any other symptoms. She reports similar episodes that last up to 10 minutes for which she has never sought medical treatment. Tonight the symptoms were pretty severe and did not resolve so she came to the ED. Patient reports that her symptoms appear to be improving while in the ED. She denies any known history of cardiac disease. She reports that she had a cardiac catheterization about 16 years ago at which time it was \"all clear.\" Patient reports that her father had a heart attack. 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    REVIEW OF SYSTEMS  Please see HPI for pertinent positives/negatives.  All other systems reviewed and are negative.     PAST MEDICAL HISTORY  Past Medical History   Diagnosis Date   • Cancer (CMS-HCC)      Breast x 2 1/2 years    • Hypertension    • Hyperlipidemia    • Gallstones    • Glaucoma      right eye        SURGICAL HISTORY  Past Surgical History   Procedure Laterality Date   • Hysterectomy laparoscopy  51 y/o     cervix and bilat ovaries removed.   • Mastectomy  age 62     right mastectomy   • Tonsillectomy and adenoidectomy  7 y/o   • Knee arthroscopy  58 y/o     tripped over baby gate    " "    SOCIAL HISTORY  Social History     Social History Main Topics   • Smoking status: Former Smoker -- 0.25 packs/day for 10 years   • Smokeless tobacco: Former User     Quit date: 03/28/1993   • Alcohol Use: 4.2 oz/week     7 Glasses of wine per week   • Drug Use: No   • Sexual Activity:     Partners: Male      Comment: ; retired RN; two kids; one grandchild        FAMILY HISTORY  Family History   Problem Relation Age of Onset   • Hypertension Father    • Heart Disease Father    • Cancer Sister      breast / possible uterine also (Dr. Mendoza)   • Hypertension Mother    • Lung Disease Mother      smoker   • Arthritis Mother      RA   • Diabetes Maternal Uncle      type I   • Cancer Maternal Grandmother      Colon   • Diabetes Maternal Grandfather      type II        CURRENT MEDICATIONS  Home Medications     Reviewed by Beth Mendoza R.N. (Registered Nurse) on 08/16/17 at 3005  Med List Status: Complete    Medication Last Dose Status    amlodipine-benazepril (LOTREL) 5-10 MG per capsule  Active    anastrozole (ARIMIDEX) 1 MG Tab 8/30/2016 Active    calcium carbonate (OS-OMERO 500) 1250 MG Tab 8/30/2016 Active    Coenzyme Q10 (CO Q 10 PO) 8/30/2016 Active    NON SPECIFIED  Active    pravastatin (PRAVACHOL) 20 MG Tab  Active    vitamin D (CHOLECALCIFEROL) 1000 UNIT Tab 8/30/2016 Active                 ALLERGIES  No Known Allergies     PHYSICAL EXAM  VITAL SIGNS: /87 mmHg  Pulse 77  Temp(Src) 36.3 °C (97.4 °F)  Resp 15  Ht 1.651 m (5' 5\")  Wt 76.2 kg (167 lb 15.9 oz)  BMI 27.96 kg/m2  SpO2 96% @LINETTE[200176::@     Pulse ox interpretation: 97% I interpret this pulse ox as normal     Constitutional: Well developed, well nourished, alert in no apparent distress, nontoxic appearance    HENT: No external signs of trauma, normocephalic, bilateral external ears normal, oropharynx moist and clear, nose normal    Eyes: PERRL, conjunctiva without erythema, no discharge, no icterus    Neck: Soft and supple, " trachea midline, no stridor, no tenderness, no LAD, no JVD, good ROM    Cardiovascular: Regular rate and rhythm, no murmurs/rubs/gallops, strong distal pulses and good perfusion    Thorax & Lungs: No respiratory distress, CTAB, no chest tenderness    Abdomen: Soft, nontender, nondistended, no guarding, no rebound, normal BS    Extremities: No clubbing, no cyanosis, no edema, no gross deformity, good ROM, no tenderness, intact distal pulses with brisk cap refill    Skin: Warm, dry, no pallor/cyanosis, no rash noted    Lymphatic: No lymphadenopathy noted    Neuro: A/O times 3, no focal deficits noted    Psychiatric: Cooperative, normal mood and affect, normal judgement, appropriate for clinical situation          DIAGNOSTIC STUDIES / PROCEDURES    EKG  12 Lead EKG obtained at 0439 and interpreted by me:   Rate: 90   Rhythm: Sinus rhythm   Ectopy: None  Intervals: Normal   Q Waves: None   QRS: Normal   ST segments: Minimal ST depression lateral leads  T Waves: Normal     Clinical Impression: Sinus rhythm with nonspecific ST changes       LABS  All labs reviewed by me.     Results for orders placed or performed during the hospital encounter of 08/16/17   CBC WITH DIFFERENTIAL   Result Value Ref Range    WBC 7.2 4.8 - 10.8 K/uL    RBC 5.15 4.20 - 5.40 M/uL    Hemoglobin 15.1 12.0 - 16.0 g/dL    Hematocrit 45.3 37.0 - 47.0 %    MCV 88.0 81.4 - 97.8 fL    MCH 29.3 27.0 - 33.0 pg    MCHC 33.3 (L) 33.6 - 35.0 g/dL    RDW 39.7 35.9 - 50.0 fL    Platelet Count 245 164 - 446 K/uL    MPV 8.8 (L) 9.0 - 12.9 fL    Neutrophils-Polys 61.60 44.00 - 72.00 %    Lymphocytes 23.30 22.00 - 41.00 %    Monocytes 8.90 0.00 - 13.40 %    Eosinophils 4.40 0.00 - 6.90 %    Basophils 0.70 0.00 - 1.80 %    Immature Granulocytes 1.10 (H) 0.00 - 0.90 %    Nucleated RBC 0.00 /100 WBC    Neutrophils (Absolute) 4.43 2.00 - 7.15 K/uL    Lymphs (Absolute) 1.68 1.00 - 4.80 K/uL    Monos (Absolute) 0.64 0.00 - 0.85 K/uL    Eos (Absolute) 0.32 0.00 - 0.51  K/uL    Baso (Absolute) 0.05 0.00 - 0.12 K/uL    Immature Granulocytes (abs) 0.08 0.00 - 0.11 K/uL    NRBC (Absolute) 0.00 K/uL   COMP METABOLIC PANEL   Result Value Ref Range    Sodium 141 135 - 145 mmol/L    Potassium 3.9 3.6 - 5.5 mmol/L    Chloride 106 96 - 112 mmol/L    Co2 25 20 - 33 mmol/L    Anion Gap 10.0 0.0 - 11.9    Glucose 105 (H) 65 - 99 mg/dL    Bun 20 8 - 22 mg/dL    Creatinine 1.14 0.50 - 1.40 mg/dL    Calcium 9.5 8.4 - 10.2 mg/dL    AST(SGOT) 43 12 - 45 U/L    ALT(SGPT) 73 (H) 2 - 50 U/L    Alkaline Phosphatase 68 30 - 99 U/L    Total Bilirubin 0.5 0.1 - 1.5 mg/dL    Albumin 4.5 3.2 - 4.9 g/dL    Total Protein 7.3 6.0 - 8.2 g/dL    Globulin 2.8 1.9 - 3.5 g/dL    A-G Ratio 1.6 g/dL   LIPASE   Result Value Ref Range    Lipase 35 7 - 58 U/L   TROPONIN   Result Value Ref Range    Troponin I <0.02 0.00 - 0.04 ng/mL   ESTIMATED GFR   Result Value Ref Range    GFR If  58 (A) >60 mL/min/1.73 m 2    GFR If Non  48 (A) >60 mL/min/1.73 m 2   EKG (NOW)   Result Value Ref Range    Report       [facility] Emergency Dept[facility]    Test Date:  2017  Pt Name:    SHANAE MACIEL                Department: [facility]  MRN:        [id number]                      Room:  Gender:     F                            Technician: VANESSA  :        1950                   Requested By:YADIRA RINCON  Order #:    [id number]                    Reading MD:    Measurements  Intervals                                Axis  Rate:       90                           P:          65  SC:         171                          QRS:        -19  QRSD:       95                           T:          35  QT:         363  QTc:        444    Interpretive Statements  Sinus rhythm  Borderline left axis deviation  RSR' in V1 or V2, probably normal variant  No previous ECG available for comparison          RADIOLOGY  The radiologist's interpretation of all radiological studies have been reviewed by me.      DX-CHEST-2 VIEWS   Final Result         1. No active cardiopulmonary abnormalities are identified.             COURSE & MEDICAL DECISION MAKING  Nursing notes, VS, PMSFHx reviewed in chart.     Review of past medical records shows the patient MRI of the brain on January 30, 2017 without acute findings.    Differential diagnoses considered include but are not limited to: AMI, dissection, PE, pneumothorax, pneumomediastinum, CHF/pulm edema, pericardial effusion/tamponade, pericarditis, pneumonia, pleural effusion, pleurisy, costochondritis, mediastinitis, esophageal rupture, esophageal spasm, GERD, gastritis, PUD, hiatal hernia, pancreatitis, cholecystitis/ascending cholangitis, gallstone/biliary colic, herpes zoster, muscle strain, neuropathy    HEART Score: 6    HISTORY  Highly suspicious +2  Moderately suspicious +1  Slightly suspicious 0    EKG  Significant ST depression +2  Nonspecific repolarization disturbance +1  Normal 0    AGE  ? 65 +2  45-65 +1  < 45 0    RISK FACTORS  Hypercholesterolemia, HTN, DM, Cigarette smoking, positive family history, obesity  ? 3 risk factors or history of atherosclerotic disease +2  1-2 risk factors +1  No risk factors known 0    TROPONIN  ? 3× normal limit +2  1-3× normal limit +1  ? normal limit 0     Patient presents to ED with above complaint. EKG did not show any acute ischemic changes and chest x-ray and labs were essentially unremarkable. Patient reports that her symptoms have resolved while in the ED and is now resting comfortably in no acute distress and nontoxic in appearance. Findings discussed with the patient. Patient agrees to plan for admission.    0555: D/W Dr. Portillo, hospitalist, who agreed to admit pt for further care.        FINAL IMPRESSION  1. Chest pain in adult           DISPOSITION  Patient will be admitted to hospitalist.      Electronically signed by: Marco Davis, 8/16/2017 4:46 AM      Portions of this record were made with voice recognition  software.  Despite my review, spelling/grammar/context errors may still remain.  Interpretation of this chart should be taken in this context.

## 2017-08-16 NOTE — PROGRESS NOTES
Bedside report received from Maci KRISHNAMURTHY @ 0700. Assumed care w/ Lilia KRISHNAMURTHY. POC discussed. Pt resting comfortably in bed. Safety precautions in place.    No changes in pt status throughout the day. No c/o chest pain. troponins and EKG's negative.

## 2017-08-16 NOTE — CARE PLAN
Problem: Communication  Goal: The ability to communicate needs accurately and effectively will improve  Intervention: Elizabeth patient and significant other/support system to call light to alert staff of needs  Patient oriented and communicated regarding labs and procedures done. She understands and agreed plan of care.

## 2017-08-16 NOTE — IP AVS SNAPSHOT
[employer] Access Code: Activation code not generated  Current [employer] Status: Patient Declined    Your email address is not on file at [facility] [employer].  Email Addresses are required for you to sign up for [employer], please contact [phone] to verify your personal information and to provide your email address prior to attempting to register for [employer].    Lauren Jiang  [address] [place] [address]    [employer]  A secure, online tool to manage your health information     [employer]’s [employer]® is a secure, online tool that connects you to your personalized health information from the privacy of your home -- day or night - making it very easy for you to manage your healthcare. Once the activation process is completed, you can even access your medical information using the [employer] mike, which is available for free in the Apple Mike store or Google Play store.     To learn more about [employer], visit [email or web address]    There are two levels of access available (as shown below):   My Chart Features  [facility] Primary Care Doctor [facility]  Specialists [facility]  Urgent  Care Non-[facility] Primary Care Doctor   Email your healthcare team securely and privately 24/7 X X X    Manage appointments: schedule your next appointment; view details of past/upcoming appointments X      Request prescription refills. X      View recent personal medical records, including lab and immunizations X X X X   View health record, including health history, allergies, medications X X X X   Read reports about your outpatient visits, procedures, consult and ER notes X X X X   See your discharge summary, which is a recap of your hospital and/or ER visit that includes your diagnosis, lab results, and care plan X X  X     How to register for [employer]:  Once your e-mail address has been verified, follow the following steps to sign up for [employer].     1. Go to  [email or web address]  2. Click on the Sign Up Now box, which takes you to the  New Member Sign Up page. You will need to provide the following information:  a. Enter your [employer] Access Code exactly as it appears at the top of this page. (You will not need to use this code after you’ve completed the sign-up process. If you do not sign up before the expiration date, you must request a new code.)   b. Enter your date of birth.   c. Enter your home email address.   d. Click Submit, and follow the next screen’s instructions.  3. Create a [employer] ID. This will be your [employer] login ID and cannot be changed, so think of one that is secure and easy to remember.  4. Create a [employer] password. You can change your password at any time.  5. Enter your Password Reset Question and Answer. This can be used at a later time if you forget your password.   6. Enter your e-mail address. This allows you to receive e-mail notifications when new information is available in [employer].  7. Click Sign Up. You can now view your health information.    For assistance activating your [employer] account, call [phone]

## 2017-08-16 NOTE — PROGRESS NOTES
Tele strip at 0740 shows SR w/ HR of 77  Measurements: .20/.08/.38    Tele Shift Summary:  Rhythm: SR  Rate: 60's-70s  Ectopy: Per CCT Lucia, pt had occasional PVC's.    Telemetry monitoring strips placed in pt chart.

## 2017-08-16 NOTE — PROGRESS NOTES
"          Nursing care plan includes knowledge deficit, potential for discomfort, potential for compromised cardiac output.  POC includes teaching, comfort measures and reassurance, and access to code cart, cardiology stand by and availability of rapid response team.  Pt verbalizes good understanding of benefits and risks of pharmacological cardiac stress test.  Informed consent obtained.  Lexiscan given, pt developed the following signs/symptoms:\"taste\".    VS stable, symptoms resolved.  To waiting room, fluids and/or snack given, awaiting second scan.  Nursing goals met.   "

## 2017-08-16 NOTE — CARE PLAN
Problem: Safety  Goal: Will remain free from injury  Intervention: Provide assistance with mobility  Patient educated on calling for assistance when needed. Call light within reach, frequently checking on patient needs and concerns.

## 2017-08-16 NOTE — PROGRESS NOTES
1320 Patient is back from stress test, states feeling well and would like to go home today.  She is sitting on a chair, eating without further needs.

## 2017-08-16 NOTE — DISCHARGE INSTRUCTIONS
Discharge Instructions    Discharged to home by car with relative. Discharged via walking, hospital escort: Refused.  Special equipment needed: Not Applicable    Be sure to schedule a follow-up appointment with your primary care doctor or any specialists as instructed.     Discharge Plan:   Diet Plan: Discussed  Activity Level: Discussed  Confirmed Follow up Appointment: Appointment Scheduled  Confirmed Symptoms Management: Discussed  Medication Reconciliation Updated: Yes  Influenza Vaccine Indication: Not indicated: Previously immunized this influenza season and > 8 years of age    I understand that a diet low in cholesterol, fat, and sodium is recommended for good health. Unless I have been given specific instructions below for another diet, I accept this instruction as my diet prescription.   Other diet: regular    Special Instructions: None    · Is patient discharged on Warfarin / Coumadin?   No     · Is patient Post Blood Transfusion?  No    Chest Pain, Nonspecific  It is often hard to give a specific diagnosis for the cause of chest pain. There is always a chance that your pain could be related to something serious, like a heart attack or a blood clot in the lungs. You need to follow up with your caregiver for further evaluation. More lab tests or other studies such as X-rays, electrocardiography, stress testing, or cardiac imaging may be needed to find the cause of your pain.  Most of the time, nonspecific chest pain improves within 2 to 3 days with rest and mild pain medicine. For the next few days, avoid physical exertion or activities that bring on pain. Do not smoke. Avoid drinking alcohol. Call your caregiver for routine follow-up as advised.   SEEK IMMEDIATE MEDICAL CARE IF:  · You develop increased chest pain or pain that radiates to the arm, neck, jaw, back, or abdomen.   · You develop shortness of breath, increased coughing, or you start coughing up blood.   · You have severe back or abdominal pain,  nausea, or vomiting.   · You develop severe weakness, fainting, fever, or chills.   Document Released: 12/18/2006 Document Revised: 03/11/2013 Document Reviewed: 06/06/2008  ExitCare® Patient Information ©2013 [employer].    Depression / Suicide Risk    As you are discharged from this [facility] facility, it is important to learn how to keep safe from harming yourself.    Recognize the warning signs:  · Abrupt changes in personality, positive or negative- including increase in energy   · Giving away possessions  · Change in eating patterns- significant weight changes-  positive or negative  · Change in sleeping patterns- unable to sleep or sleeping all the time   · Unwillingness or inability to communicate  · Depression  · Unusual sadness, discouragement and loneliness  · Talk of wanting to die  · Neglect of personal appearance   · Rebelliousness- reckless behavior  · Withdrawal from people/activities they love  · Confusion- inability to concentrate     If you or a loved one observes any of these behaviors or has concerns about self-harm, here's what you can do:  · Talk about it- your feelings and reasons for harming yourself  · Remove any means that you might use to hurt yourself (examples: pills, rope, extension cords, firearm)  · Get professional help from the community (Mental Health, Substance Abuse, psychological counseling)  · Do not be alone:Call your Safe Contact- someone whom you trust who will be there for you.  · Call your local CRISIS HOTLINE 864-2383 or 817-746-4684  · Call your local Children's Mobile Crisis Response Team [place] (307) 824-9752 or www.IMedExchange  · Call the toll free National Suicide Prevention Hotlines   · National Suicide Prevention Lifeline 958-350-TRHO (5693)  · National Hope Line Network 800-SUICIDE (221-7863)

## 2017-08-16 NOTE — H&P
CHIEF COMPLAINT:  Chest pain.    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  She has had a history of   reflux disease and was on Prilosec for a while, but has gotten off the   medication since 2 years ago and has had no issues since then.    She was doing well until around 3:00 a.m. after she was awakened by chest pain   on the left precordium, which she described as burning pressure, rated 7/10   in severity, and was waxing and waning, without any radiation.  It did not   have any associated shortness of breath, sweating, nausea.  She did not have   any other symptoms such as lightheadedness, dizziness, or leg swelling.  The   pain did not relent for the next 1-1/2 hours, prompting her to go to the ED.    Notably, last night before going to bed at around 2:00 a.m., she ate a   chocolate [employer] peanut butter cup ice cream prior to sleeping.    EMERGENCY DEPARTMENT COURSE:  The patient was initially evaluated in the   emergency department, was maintaining good hemodynamics, saturating well on   room air, and was afebrile.  Initial blood workup was impressive with   unremarkable CBC and BMP, with negative troponin.  Chest x-ray (my read) did   not show any infiltrates, consolidation, effusion or pneumothorax.  EKG (my   read) showed normal sinus rhythm without any ischemic changes.  Patient was   given aspirin.  She was subsequently admitted to the hospitalist service for   further evaluation and management.    REVIEW OF SYSTEMS  A complete review of system was done. All other systems were negative.    PMH/PSH/FMH: I personally reviewed all ancillary histories as noted.    PAST MEDICAL HISTORY:  Past Medical History   Diagnosis Date   • Cancer (CMS-HCC)      Breast x 2 1/2 years     • Hypertension    • Hyperlipidemia    • Gallstones    • Glaucoma      right eye       PAST SURGICAL HISTORY:  Past Surgical History   Procedure Laterality Date   • Hysterectomy laparoscopy  53 y/o     cervix and bilat ovaries removed.   • Mastectomy  age 62     right mastectomy   • Tonsillectomy and adenoidectomy  7 y/o   • Knee arthroscopy  60 y/o     tripped over baby gate       PERSONAL/SOCIAL HISTORY:  Social History   Substance Use Topics   • Smoking status: Former Smoker -- 0.25 packs/day for 10 years   • Smokeless tobacco: Former User     Quit date: 03/28/1993   • Alcohol Use: 4.2 oz/week     7 Glasses of wine per week       FAMILY MEDICAL HISTORY:  Family History   Problem Relation Age of Onset   • Hypertension Father    • Heart Disease Father    • Cancer Sister      breast / possible uterine also (Dr. Mendoza)   • Hypertension Mother    • Lung Disease Mother      smoker   • Arthritis Mother      RA   • Diabetes Maternal Uncle      type I   • Cancer Maternal Grandmother      Colon   • Diabetes Maternal Grandfather      type II       ALLERGIES:  Review of patient's allergies indicates no known allergies.    HOME MEDICATIONS:  Medication Sig   Cholecalciferol (VITAMIN D) 2000 UNITS Cap Take 1 Cap by mouth every evening.   therapeutic multivitamin-minerals (THERAGRAN-M) Tab Take 1 Tab by mouth every day.   aspirin EC (ECOTRIN) 81 MG Tablet Delayed Response Take 81 mg by mouth as needed (For chest pain).   pravastatin (PRAVACHOL) 20 MG Tab Take 1 Tab by mouth every bedtime.   amlodipine-benazepril (LOTREL) 5-10 MG per capsule Take 1 Cap by mouth every day.   anastrozole (ARIMIDEX) 1 MG Tab Take 1 mg by mouth every day.   calcium carbonate (OS-OMERO 500) 1250 MG Tab Take 1,250 mg by mouth 2 times a day, with meals.   Coenzyme Q10 (CO Q 10 PO) Take 1 Cap by mouth every bedtime.           PHYSICAL EXAMINATION:  VITAL SIGNS:  Blood pressure 130/78, heart rate 78, respiratory rate 18,   oxygen saturation 95% on room air,  temperature 36.6 degrees Celsius.  CONSTITUTIONAL: (-) diaphoresis, (-) distress  HENT: Normocephalic, atraumatic, (-) tonsillopharyngal congestion or exudate.  EYES: PERRLA, pink conjuctivae, (-) icteric sclerae  NECK: (-) cervical lymphadenopathy, (-) neck rigidity  CARDIOVASCULAR: Distinct heart sounds, RRR, (-) murmurs, rubs, gallops, (-) LE edema  RESPIRATORY: Equal chest expansion, clear breath sounds, (-) crackles/wheezes/rales/rhonchi  GASTROINTESTINAL: normoactive bowel sounds, soft, (-) tenderness, (-) masses, (-) guarding/rebound  MUSCULOSKELETAL: (-) joint swelling/tenderness, (-) joint deformities, (-) muscle tenderness,   (-) gross limitation of movement of 4 extremities  SKIN: (-) erythema, warmth, rashes, ulcers, open wounds  PSYCHIATRIC: mood, affect, and thought content WNL, behavior age appropriate  NEUROLOGIC: Non-focal, moves all 4 extremities, sensory grossly intact      PERTINENT DIAGNOSTIC RESULTS:  Reviewed, and as mentioned above. Please refer to ED course.      ASSESSMENTS:  1.  Atypical chest pain  2.  Chronic kidney disease stage II.  3.  History of breast cancer, in remission.  4.  Chronic hypertension.  5.  Hyperlipidemia.  6.  Osteopenia.    PLANS:  - I will admit her to telemetry. Pt is appropriate for observation level of care.  - Will further work her up for cardiac cause of chest pain, although I highly suspect GERD as cause of her  Symptoms. We will trend her troponins, and if negative, will do nuclear myocardial perfusion imagine.  - will monitor her on telemetry. We will also monitor her for recurrence of her chest pain.  - I will continue her home doses of aspirin and statin. Will resume her home BP meds as well.   - the rest of her home medications will be reconciled and resumed.     DVT prophylaxis - low risk. Ambulate.   GI prophylaxis - not indicated  Code status - full code.       ____________________________________     JERROD Yu / ASHWIN    DD:   08/16/2017 16:04:25  DT:  08/16/2017 16:54:25    D#:  [id number]  Job#:  [id number]

## 2017-08-16 NOTE — PROGRESS NOTES
0900 Assessment performed, patient denies chest pain or other complaint.    1120 Patient taken down for stress test.

## 2017-08-17 NOTE — DISCHARGE SUMMARY
HOSPITAL MEDICINE DISCHARGE SUMMARY    Name: Lauren Jiang  MRN: [id number]  : 1950  Admit Date: 2017  Discharge Date: 2017  Attending Provider: Valdemar Singh M.D.  Admitting Provider: Valdemar Singh M.D.  Primary Care Physician: CHIDI Her    DISCHARGE DIAGNOSES:   Active Problems:    History of breast cancer POA: Yes    Hyperlipidemia LDL goal <100 POA: Yes    CKD (chronic kidney disease), stage II POA: Yes    HTN (hypertension) POA: Yes    Osteopenia POA: Yes  Resolved Problems:    Atypical chest pain due to GERD POA: Yes        SUMMARY OF EVENTS LEADING TO ADMISSION:   This is a 67/F with history of GERD, breast CA, and HTN, who presented to the ED   with burning left sided chest pain after eating ice cream bar before sleeping, and   was awaken by chest pain.    For further details of history of present illness, past medical/social/family histories,   allergies and medication, please refer to copy of admission note by Dr. Valdemar Singh M.D. on 2017.     HOSPITAL COURSE:  The patient was admitted to the hospitalist service after    being initially evaluated in the emergency department.  Serial troponins were    ordered, and remained negative x3.  She was monitored on telemetry, which did    not show any arrhythmia.  She did not have any further recurrence of the chest   pain.  A nuclear stress test was obtained, which did not show any evidence of   jeopardized viable myocardium, with ejection fraction of 57%.  It was felt    that her symptoms were more related to GERD after eating her chocolate ice    cream.  She was counseled on taking over-the-counter Prilosec again, and    p.r.n. Maalox or Tums with reflux/chest pain recurrence.  She agreed and    understood.    She remained hemodynamically stable and afebrile.  As mentioned, she did not    have any further recurrence of the chest pain, and has agreed with the    interventions if she does have  recurrence.  She was deemed ready to be    discharged from the hospital as she did not have any further inpatient needs.     Patient felt comfortable going home.    The patient was subsequently sent home in improved and stable condition.     DISCHARGE DISPOSITION: recovered as expected.     FOLLOW-UP ISSUES:   -- Her chest pain is likely atypical chest pain due to gastroesophageal reflux   disease.  She was counseled on resuming Prilosec, and taking p.r.n. Maalox    and Tums for chest pain recurrence.  If she does have consistent chest pain    recurrence, she was advised to call her primary care physician or go back to    the emergency department if the chest pain becomes unrelenting.  She    understands.  She will follow up with her primary care physician in 1-2 weeks.  -- She will resume her home dose blood pressure medications.  -- She will resume her home dose Arimidex for her history of breast cancer.    CHANGES IN MANAGEMENT OF CHRONIC CONDITION: As above.     PENDING TESTS: none    FOLLOW-UP TESTS ORDERED POST DISCHARGE: none    DISCHARGE MEDICATIONS:   Medication Sig Start Date End Date   Cholecalciferol (VITAMIN D) 2000 UNITS Cap Take 1 Cap by mouth every evening.     therapeutic multivitamin-minerals (THERAGRAN-M) Tab Take 1 Tab by mouth every day.     aspirin EC (ECOTRIN) 81 MG Tablet Delayed Response Take 81 mg by mouth as needed (For chest pain).     pravastatin (PRAVACHOL) 20 MG Tab Take 1 Tab by mouth every bedtime. 8/8/17    amlodipine-benazepril (LOTREL) 5-10 MG per capsule Take 1 Cap by mouth every day. 3/28/17    anastrozole (ARIMIDEX) 1 MG Tab Take 1 mg by mouth every day.     calcium carbonate (OS-OMERO 500) 1250 MG Tab Take 1,250 mg by mouth 2 times a day, with meals.     Coenzyme Q10 (CO Q 10 PO) Take 1 Cap by mouth every bedtime.            FOLLOW-UP APPOINTMENTS:   No follow-up provider specified.    PCP in 1-2 weeks    For further details on discharge medications, patient education, diet, and  activity, please refer to electronic copy of discharge instructions.       TIME SPENT: 65 minutes spent on same-day admission and discharge services, with greater than 50% of the time spent on face-to-face encounter, addressing medical issues, coordination of care, counseling, discharge planning, medication reconciliation, and documentation. Patient's stay is >8hrs but <24 hours.

## 2017-08-18 ENCOUNTER — TELEPHONE (OUTPATIENT)
Dept: MEDICAL GROUP | Facility: LAB | Age: 67
End: 2017-08-18

## 2017-08-18 NOTE — TELEPHONE ENCOUNTER
ESTABLISHED PATIENT PRE-VISIT PLANNING     Note: Patient will not be contacted if there is no indication to call.     1.  Reviewed notes from the last few office visits within the medical group: Yes.  Was in the ER on 8/16/17/for atypical CP.  Last visit here on 3/26/17 to establish care.  Saw Dr. Jay at [facility] on 1/6/17 for f/u breast cancer.  Had     2.  If any orders were placed at last visit or intended to be done for this visit (i.e. 6 mos follow-up), do we have Results/Consult Notes?        •  Labs - Labs were not ordered at last office visit.  Done on 8/16/17 with Dr. Portillo in the ED.       •  Imaging - Imaging was not ordered at last office visit.  EGD and colonoscopy done at [facility] with Dr. Crum on 5/28/15.  Had bone density completed on 9/19/15 at [facility].  Saw Dr. Portillo for stress test on 8/16/17.  Mammogram completed on 5/23/17 with Dr. Garg.       •  Referrals - No referrals were ordered at last office visit.    3. Is this appointment scheduled as a Hospital Follow-Up? Yes, visit was at [facility].     4.  Immunizations were updated in [facility] using WebIZ?: Yes       •  Web Iz Recommendations: PNEUMOVAX (PPSV23), TD and ZOSTAVAX (Shingles)    5.  Patient is due for the following Health Maintenance Topics:   Health Maintenance Due   Topic Date Due   • Annual Wellness Visit  1950   • IMM ZOSTER VACCINE  06/18/2011   • BONE DENSITY  06/18/2016   • IMM PNEUMOCOCCAL 65+ (ADULT) LOW/MEDIUM RISK SERIES (2 of 2 - PPSV23) 08/17/2017       - Patient has completed PREVNAR (PCV13)  and TDAP Immunization(s) per WebIZ. Chart has been updated.    6.  Patient was NOT informed to arrive 15 min prior to their scheduled appointment and bring in their medication bottles.

## 2017-08-21 ENCOUNTER — HOSPITAL ENCOUNTER (OUTPATIENT)
Dept: LAB | Facility: MEDICAL CENTER | Age: 67
End: 2017-08-21
Attending: NURSE PRACTITIONER
Payer: MEDICARE

## 2017-08-21 ENCOUNTER — OFFICE VISIT (OUTPATIENT)
Dept: MEDICAL GROUP | Facility: LAB | Age: 67
End: 2017-08-21
Payer: MEDICARE

## 2017-08-21 ENCOUNTER — HOSPITAL ENCOUNTER (OUTPATIENT)
Dept: LAB | Facility: MEDICAL CENTER | Age: 67
End: 2017-08-21
Attending: SPECIALIST
Payer: MEDICARE

## 2017-08-21 VITALS
WEIGHT: 170 LBS | TEMPERATURE: 97.7 F | DIASTOLIC BLOOD PRESSURE: 78 MMHG | HEIGHT: 65 IN | BODY MASS INDEX: 28.32 KG/M2 | RESPIRATION RATE: 12 BRPM | HEART RATE: 77 BPM | OXYGEN SATURATION: 92 % | SYSTOLIC BLOOD PRESSURE: 116 MMHG

## 2017-08-21 DIAGNOSIS — R07.89 OTHER CHEST PAIN: ICD-10-CM

## 2017-08-21 DIAGNOSIS — Z78.0 MENOPAUSE: ICD-10-CM

## 2017-08-21 DIAGNOSIS — K21.9 GASTROESOPHAGEAL REFLUX DISEASE WITHOUT ESOPHAGITIS: ICD-10-CM

## 2017-08-21 DIAGNOSIS — E78.5 HYPERLIPIDEMIA LDL GOAL <100: ICD-10-CM

## 2017-08-21 LAB
ALBUMIN SERPL BCP-MCNC: 4 G/DL (ref 3.2–4.9)
ALBUMIN/GLOB SERPL: 1.7 G/DL
ALP SERPL-CCNC: 57 U/L (ref 30–99)
ALT SERPL-CCNC: 30 U/L (ref 2–50)
ANION GAP SERPL CALC-SCNC: 7 MMOL/L (ref 0–11.9)
AST SERPL-CCNC: 19 U/L (ref 12–45)
BASOPHILS # BLD AUTO: 0.9 % (ref 0–1.8)
BASOPHILS # BLD: 0.05 K/UL (ref 0–0.12)
BILIRUB SERPL-MCNC: 0.7 MG/DL (ref 0.1–1.5)
BUN SERPL-MCNC: 17 MG/DL (ref 8–22)
CALCIUM SERPL-MCNC: 9.2 MG/DL (ref 8.5–10.5)
CHLORIDE SERPL-SCNC: 108 MMOL/L (ref 96–112)
CHOLEST SERPL-MCNC: 162 MG/DL (ref 100–199)
CO2 SERPL-SCNC: 24 MMOL/L (ref 20–33)
CREAT SERPL-MCNC: 0.84 MG/DL (ref 0.5–1.4)
EOSINOPHIL # BLD AUTO: 0.31 K/UL (ref 0–0.51)
EOSINOPHIL NFR BLD: 5.3 % (ref 0–6.9)
ERYTHROCYTE [DISTWIDTH] IN BLOOD BY AUTOMATED COUNT: 40.8 FL (ref 35.9–50)
GFR SERPL CREATININE-BSD FRML MDRD: >60 ML/MIN/1.73 M 2
GLOBULIN SER CALC-MCNC: 2.4 G/DL (ref 1.9–3.5)
GLUCOSE SERPL-MCNC: 90 MG/DL (ref 65–99)
HCT VFR BLD AUTO: 41.8 % (ref 37–47)
HDLC SERPL-MCNC: 57 MG/DL
HGB BLD-MCNC: 13.7 G/DL (ref 12–16)
IMM GRANULOCYTES # BLD AUTO: 0.04 K/UL (ref 0–0.11)
IMM GRANULOCYTES NFR BLD AUTO: 0.7 % (ref 0–0.9)
LDLC SERPL CALC-MCNC: 79 MG/DL
LYMPHOCYTES # BLD AUTO: 1.34 K/UL (ref 1–4.8)
LYMPHOCYTES NFR BLD: 23 % (ref 22–41)
MCH RBC QN AUTO: 29.1 PG (ref 27–33)
MCHC RBC AUTO-ENTMCNC: 32.8 G/DL (ref 33.6–35)
MCV RBC AUTO: 88.9 FL (ref 81.4–97.8)
MONOCYTES # BLD AUTO: 0.57 K/UL (ref 0–0.85)
MONOCYTES NFR BLD AUTO: 9.8 % (ref 0–13.4)
NEUTROPHILS # BLD AUTO: 3.51 K/UL (ref 2–7.15)
NEUTROPHILS NFR BLD: 60.3 % (ref 44–72)
NRBC # BLD AUTO: 0 K/UL
NRBC BLD AUTO-RTO: 0 /100 WBC
PLATELET # BLD AUTO: 223 K/UL (ref 164–446)
PMV BLD AUTO: 9.7 FL (ref 9–12.9)
POTASSIUM SERPL-SCNC: 3.7 MMOL/L (ref 3.6–5.5)
PROT SERPL-MCNC: 6.4 G/DL (ref 6–8.2)
RBC # BLD AUTO: 4.7 M/UL (ref 4.2–5.4)
SODIUM SERPL-SCNC: 139 MMOL/L (ref 135–145)
TRIGL SERPL-MCNC: 128 MG/DL (ref 0–149)
WBC # BLD AUTO: 5.8 K/UL (ref 4.8–10.8)

## 2017-08-21 PROCEDURE — 99214 OFFICE O/P EST MOD 30 MIN: CPT | Performed by: NURSE PRACTITIONER

## 2017-08-21 PROCEDURE — 80061 LIPID PANEL: CPT

## 2017-08-21 RX ORDER — OMEPRAZOLE 20 MG/1
20 CAPSULE, DELAYED RELEASE ORAL DAILY
Qty: 30 CAP | Refills: 3 | Status: SHIPPED | OUTPATIENT
Start: 2017-08-21 | End: 2018-01-05 | Stop reason: SDUPTHER

## 2017-08-21 NOTE — MR AVS SNAPSHOT
"Lauren Jiang   2017 7:00 AM   Office Visit   MRN: [id number]    Department:  [facility]   Dept Phone:  [phone]    Description:  Female : 1950   Provider:  CHIDI Her           Reason for Visit     Hospital Follow-up F/V on ER visit      Allergies as of 2017     No Known Allergies      You were diagnosed with     Other chest pain   [786.59.ICD-9-CM]       Menopause   [785065]       Gastroesophageal reflux disease without esophagitis   [362124]         Vital Signs     Blood Pressure Pulse Temperature Respirations Height Weight    116/78 mmHg 77 36.5 °C (97.7 °F) 12 1.651 m (5' 5\") 77.111 kg (170 lb)    Body Mass Index Oxygen Saturation Smoking Status             28.29 kg/m2 92% Former Smoker         Basic Information     Date Of Birth Sex Race Ethnicity Preferred Language    1950 Female White Non- English      Problem List              ICD-10-CM Priority Class Noted - Resolved    History of breast cancer Z85.3 Low  2016 - Present    Essential hypertension I10   2016 - Present    Glaucoma H40.9   2016 - Present    Hyperlipidemia LDL goal <100 E78.5 Low  2016 - Present    Hot flashes R23.2   2016 - Present    Osteopenia M85.80   2016 - Present    Decreased GFR R94.4   2016 - Present    Fatigue R53.83   2016 - Present    CKD (chronic kidney disease), stage II N18.2 Low  2017 - Present    HTN (hypertension) I10 Low  2017 - Present      Health Maintenance        Date Due Completion Dates    IMM ZOSTER VACCINE 2011 ---    BONE DENSITY 2016 ---    IMM PNEUMOCOCCAL 65+ (ADULT) LOW/MEDIUM RISK SERIES (2 of 2 - PPSV23) 2017    IMM INFLUENZA (1) 2016, 2015    MAMMOGRAM 2018, 2016, 3/18/2015, 3/18/2015, 3/17/2014    COLONOSCOPY 2020    IMM DTaP/Tdap/Td Vaccine (2 - Td) 2021 2011            Current Immunizations     13-VALENT " PCV PREVNAR 8/17/2016  3:25 PM    Influenza Vaccine Adult HD 11/22/2016  9:53 AM    Influenza Vaccine Quad Inj (Pf) 11/7/2015 10:34 AM    Tdap Vaccine 2/17/2011      Below and/or attached are the medications your provider expects you to take. Review all of your home medications and newly ordered medications with your provider and/or pharmacist. Follow medication instructions as directed by your provider and/or pharmacist. Please keep your medication list with you and share with your provider. Update the information when medications are discontinued, doses are changed, or new medications (including over-the-counter products) are added; and carry medication information at all times in the event of emergency situations     Allergies:  No Known Allergies          Medications  Valid as of: August 21, 2017 -  9:10 AM    Generic Name Brand Name Tablet Size Instructions for use    Amlodipine Besy-Benazepril HCl (Cap) LOTREL 5-10 MG Take 1 Cap by mouth every day.        Anastrozole (Tab) ARIMIDEX 1 MG Take 1 mg by mouth every day.        Aspirin (Tablet Delayed Response) ECOTRIN 81 MG Take 81 mg by mouth as needed (For chest pain).        Calcium Carbonate (Tab) OS-OMERO 500 1250 (500 Ca) MG Take 1,250 mg by mouth 2 times a day, with meals.        Cholecalciferol (Cap) Vitamin D 2000 units Take 1 Cap by mouth every evening.        Coenzyme Q10   Take 1 Cap by mouth every bedtime.        Multiple Vitamins-Minerals (Tab) THERAGRAN-M  Take 1 Tab by mouth every day.        Omeprazole (CAPSULE DELAYED RELEASE) PRILOSEC 20 MG Take 1 Cap by mouth every day.        Pravastatin Sodium (Tab) PRAVACHOL 20 MG Take 1 Tab by mouth every bedtime.        .                 Medicines prescribed today were sent to:     [id number] - [place], [place] - [address]    [address] [facility] [address]    Phone: [phone] Fax: [phone]    Open 24 Hours?: No      Medication refill instructions:       If your  prescription bottle indicates you have medication refills left, it is not necessary to call your provider’s office. Please contact your pharmacy and they will refill your medication.    If your prescription bottle indicates you do not have any refills left, you may request refills at any time through one of the following ways: The online Travel[employer] system (except Urgent Care), by calling your provider’s office, or by asking your pharmacy to contact your provider’s office with a refill request. Medication refills are processed only during regular business hours and may not be available until the next business day. Your provider may request additional information or to have a follow-up visit with you prior to refilling your medication.   *Please Note: Medication refills are assigned a new Rx number when refilled electronically. Your pharmacy may indicate that no refills were authorized even though a new prescription for the same medication is available at the pharmacy. Please request the medicine by name with the pharmacy before contacting your provider for a refill.        Your To Do List     Future Labs/Procedures Complete By Expires    DS-BONE DENSITY STUDY (DEXA)  As directed 2/21/2018      Referral     A referral request has been sent to our patient care coordination department. Please allow 3-5 business days for us to process this request and contact you either by phone or mail. If you do not hear from us by the 5th business day, please call us at [phone].           TravelCLICK Status: Patient Declined

## 2017-08-21 NOTE — PROGRESS NOTES
Chief Complaint   Patient presents with   • Hospital Follow-up     F/V on ER visit       GARCÍA Aguilar is a 67-year-old established female here to follow-up on a recent ER visit. She presented to the emergency department because she woke up out of her sleep with burning and a pressure type chest pain. Her chest pain lasted for an hour and a half before she presented to the emergency department. She reports that her chest pain was not associated with shortness of breath, dizziness or any diaphoresis. She was not nauseated or vomiting. Prior to going to the emergency department she did not take any medication at home. In the ER she had negative troponins ×3, negative monitoring on telemetry, an EKG in the ER showing normal sinus rhythm and a nuclear stress test that was negative for ischemia, showing an ejection fraction of 57%. Since she's been home from the hospital, she reports that she has not had a reoccurrence of chest pain.    She does admit that she has had episodes of chest pain on and off over the past 6 months. Typically her chest pain episodes will last for about 15 minutes maximum and are associated with burning and pressure. She does have a history of a hiatal hernia and GERD. Her last endoscopy was about a year ago and she was taken off of Prilosec, told only to use intermittently. She has not been using Prilosec for almost a year.  Cardiac cath 16 yrs ago due to CP - ended up a result of low iron d/t menorrhagia - cardiologist at that point was Dr. Brar.   Dad  in sleep at age 66 but had a history of heart disease and hypertension. Mother has hypertension.  No hx of long term smoking.  Pt does have HTN and hyperlipidemia, is on medication for both.    Family History   Problem Relation Age of Onset   • Hypertension Father    • Heart Disease Father    • Cancer Sister      breast / possible uterine also (Dr. Mendoza)   • Hypertension Mother    • Lung Disease Mother      smoker   • Arthritis Mother       "RA   • Diabetes Maternal Uncle      type I   • Cancer Maternal Grandmother      Colon   • Diabetes Maternal Grandfather      type II       Past medical, surgical, family, and social history is reviewed and updated in Epic chart by me today.   Medications and allergies reviewed and updated in Epic chart by me today.     ROS:   As documented in history of present illness above    Exam:  Blood pressure 116/78, pulse 77, temperature 36.5 °C (97.7 °F), resp. rate 12, height 1.651 m (5' 5\"), weight 77.111 kg (170 lb), SpO2 92 %.  Constitutional: Alert, no distress, plus 3 vital signs  Skin:  Warm, dry, no rashes invisible areas  Eye: Equal, round and reactive, conjunctiva clear  ENMT: Lips without lesions, good dentition, oropharynx clear    Neck: Trachea midline  Respiratory: Unlabored respiration, lungs clear to auscultation, no wheezes, no rhonchi  Cardiovascular: Normal rate and rhythm, no murmur, no edema  Psych: Alert, pleasant, well-groomed, normal affect    A/P:  1. Other chest pain  -10 year cardiac risk is 6%. I do think that she should have a consult with Dr. Brar because of new onset chest pain episodes over the past 6 months. She is going to obtain an updated lipid panel this morning after our visit. Encouraged her to restart omeprazole 20 mg daily. She'll continue to exercise, she is not having any chest pain when she rides her stationary bike. I did encourage her to return to the emergency department with any reoccurrence of chest pain, especially if unrelieved by Tums or Maalox at home.  - REFERRAL TO CARDIOLOGY    2. Menopause  -Recommend updated bone density  - DS-BONE DENSITY STUDY (DEXA); Future    3. Gastroesophageal reflux disease without esophagitis  - omeprazole (PRILOSEC) 20 MG delayed-release capsule; Take 1 Cap by mouth every day.  Dispense: 30 Cap; Refill: 3      "

## 2017-08-22 ENCOUNTER — TELEPHONE (OUTPATIENT)
Dept: MEDICAL GROUP | Facility: LAB | Age: 67
End: 2017-08-22

## 2017-08-22 NOTE — TELEPHONE ENCOUNTER
----- Message from CHIDI Her sent at 8/21/2017  5:57 PM PDT -----  Lauren - cholesterol looks excellent!

## 2017-08-23 ENCOUNTER — OFFICE VISIT (OUTPATIENT)
Dept: CARDIOLOGY | Facility: PHYSICIAN GROUP | Age: 67
End: 2017-08-23
Payer: MEDICARE

## 2017-08-23 VITALS
HEIGHT: 65 IN | SYSTOLIC BLOOD PRESSURE: 106 MMHG | HEART RATE: 78 BPM | BODY MASS INDEX: 27.82 KG/M2 | OXYGEN SATURATION: 95 % | DIASTOLIC BLOOD PRESSURE: 68 MMHG | WEIGHT: 167 LBS

## 2017-08-23 DIAGNOSIS — I10 ESSENTIAL HYPERTENSION: ICD-10-CM

## 2017-08-23 DIAGNOSIS — E66.3 OVERWEIGHT(278.02): ICD-10-CM

## 2017-08-23 DIAGNOSIS — R07.89 OTHER CHEST PAIN: Primary | ICD-10-CM

## 2017-08-23 DIAGNOSIS — E78.5 HYPERLIPIDEMIA LDL GOAL <100: ICD-10-CM

## 2017-08-23 PROCEDURE — 99204 OFFICE O/P NEW MOD 45 MIN: CPT | Performed by: INTERNAL MEDICINE

## 2017-08-23 ASSESSMENT — ENCOUNTER SYMPTOMS: NERVOUS/ANXIOUS: 1

## 2017-08-23 NOTE — PROGRESS NOTES
"Subjective:   Lauren Jiang is a 67 -year-old woman with a history of breast cancer in remission, and GERD recently (8/16/2017) admitted to the hospital for chest pain concerning for angina in the setting of a prior workup that was negative for the same including a cardiac catheterization 16 years ago.    She tells me today about her crushing/burning left-sided point tenderness that typically last for 30-60 seconds and occurs with very sporadic frequency, but twice within a week prior to her recent presentation to the emergency room for evaluation of the same. It is been present for the last 6 months. She can identify no palliating or provoking factors most especially exercise. Specifically, she tells me that her highest level of physical activity in the last several months including moving. It typically wakes her from sleep.    She also details with me her previous investigation for chest pain 16 years ago culminating in a cardiac catheterization. That ultimately became attributed to iron deficiency anemia.    She goes on to tell me about quite a bit of life stress that she has had in the last 6 months. She had to suddenly move when they decided to sell their house. Her son-in-law was accepted to a position for the [employer]. Also, her oldest daughter is going through what she describes as a \"nasty\" divorce. Related to all of that, she and her  and took a spur of the moment 10 day, 3000 mile trip.    Past Medical History   Diagnosis Date   • Cancer (CMS-HCC)      Breast x 2 1/2 years    • Hypertension    • Hyperlipidemia    • Gallstones    • Glaucoma      right eye     Past Surgical History   Procedure Laterality Date   • Hysterectomy laparoscopy  53 y/o     cervix and bilat ovaries removed.   • Mastectomy  age 62     right mastectomy   • Tonsillectomy and adenoidectomy  5 y/o   • Knee arthroscopy  60 y/o     tripped over baby gate     Family History   Problem Relation Age of Onset   • Hypertension Father    • " "Heart Disease Father    • Heart Attack Father 60     MI, CABG   • Cancer Sister      breast / possible uterine also (Dr. Mendoza)   • Hypertension Mother    • Lung Disease Mother      smoker   • Arthritis Mother      RA   • Stroke Mother    • Diabetes Maternal Uncle      type I   • Cancer Maternal Grandmother      Colon   • Diabetes Maternal Grandfather      type II     History   Smoking status   • Former Smoker -- 0.25 packs/day for 10 years   Smokeless tobacco   • Former User   • Quit date: 03/28/1993     No Known Allergies  Outpatient Encounter Prescriptions as of 8/23/2017   Medication Sig Dispense Refill   • omeprazole (PRILOSEC) 20 MG delayed-release capsule Take 1 Cap by mouth every day. 30 Cap 3   • Cholecalciferol (VITAMIN D) 2000 UNITS Cap Take 1 Cap by mouth every evening.     • therapeutic multivitamin-minerals (THERAGRAN-M) Tab Take 1 Tab by mouth every day.     • aspirin EC (ECOTRIN) 81 MG Tablet Delayed Response Take 81 mg by mouth as needed (For chest pain).     • pravastatin (PRAVACHOL) 20 MG Tab Take 1 Tab by mouth every bedtime. 90 Tab 2   • amlodipine-benazepril (LOTREL) 5-10 MG per capsule Take 1 Cap by mouth every day. 90 Cap 3   • anastrozole (ARIMIDEX) 1 MG Tab Take 1 mg by mouth every day.     • calcium carbonate (OS-OMERO 500) 1250 MG Tab Take 1,250 mg by mouth 2 times a day, with meals.     • Coenzyme Q10 (CO Q 10 PO) Take 1 Cap by mouth every bedtime.       No facility-administered encounter medications on file as of 8/23/2017.     Review of Systems   Cardiovascular: Positive for chest pain.   Psychiatric/Behavioral: The patient is nervous/anxious.    All other systems reviewed and are negative.       Objective:   /68 mmHg  Pulse 78  Ht 1.651 m (5' 5\")  Wt 75.751 kg (167 lb)  BMI 27.79 kg/m2  SpO2 95%    Physical Exam   Constitutional: She is oriented to person, place, and time. She appears well-developed and well-nourished. No distress.   Pleasant, elderly woman in no distress "   HENT:   Head: Normocephalic and atraumatic.   Eyes: Conjunctivae and EOM are normal. Pupils are equal, round, and reactive to light. No scleral icterus.   Neck: Neck supple. No JVD present. No tracheal deviation present.   Cardiovascular: Normal rate, regular rhythm, normal heart sounds and intact distal pulses.  Exam reveals no gallop and no friction rub.    No murmur heard.  Pulses:       Dorsalis pedis pulses are 2+ on the right side, and 2+ on the left side.   No carotid bruits   Pulmonary/Chest: Effort normal and breath sounds normal. No stridor. No respiratory distress. She has no wheezes. She has no rales.   Abdominal: Soft. Bowel sounds are normal. She exhibits no distension.   Musculoskeletal: She exhibits no edema.   Neurological: She is alert and oriented to person, place, and time.   Skin: Skin is warm and dry. No rash noted. She is not diaphoretic. No erythema. No pallor.   Psychiatric: She has a normal mood and affect. Judgment and thought content normal.   Vitals reviewed.    Lab Results   Component Value Date/Time    WBC 5.8 08/21/2017 07:29 AM    RBC 4.70 08/21/2017 07:29 AM    HEMOGLOBIN 13.7 08/21/2017 07:29 AM    HEMATOCRIT 41.8 08/21/2017 07:29 AM    MCV 88.9 08/21/2017 07:29 AM    MCH 29.1 08/21/2017 07:29 AM    MCHC 32.8* 08/21/2017 07:29 AM    MPV 9.7 08/21/2017 07:29 AM        Lab Results   Component Value Date/Time    SODIUM 139 08/21/2017 07:29 AM    POTASSIUM 3.7 08/21/2017 07:29 AM    CHLORIDE 108 08/21/2017 07:29 AM    CO2 24 08/21/2017 07:29 AM    GLUCOSE 90 08/21/2017 07:29 AM    BUN 17 08/21/2017 07:29 AM    CREATININE 0.84 08/21/2017 07:29 AM        Lab Results   Component Value Date/Time    AST(SGOT) 19 08/21/2017 07:29 AM    ALT(SGPT) 30 08/21/2017 07:29 AM        Lab Results   Component Value Date/Time    CHOLESTEROL, 08/21/2017 07:31 AM    LDL 79 08/21/2017 07:31 AM    HDL 57 08/21/2017 07:31 AM    TRIGLYCERIDES 128 08/21/2017 07:31 AM       Myocardial perfusion  "imaging, 8/16/2017:  \" NUCLEAR IMAGING INTERPRETATION   Negative stress ECG for ischemia.   No evidence of significant jeopardized viable myocardium or prior myocardial    infarction.   Normal myocardial perfusion with no ischemia.   Normal left ventricular size, ejection fraction, and wall motion.   Normal left ventricular wall motion.  LV ejection fraction = 57%.   ECG INTERPRETATION   Negative stress ECG for ischemia\"    Assessment:     1. Other chest pain     2. Essential hypertension     3. Hyperlipidemia LDL goal <100     4. Overweight         Medical Decision Making:  Today's Assessment / Status / Plan:     The chest pain that she describes to me today does sound more likely gastrointestinal in etiology in conjunction with her hiatal hernia. I review the results of her myocardial perfusion imaging just about a week ago at this point that was negative for ischemia. I discussed with her that if she does not get relief from her proton pump inhibitor I would consider a CT coronary calcium scan as an alternative to look for atherosclerosis that could explain a false negative myocardial perfusion imaging scan. At this point, I have recommended no additional cardiovascular workup. Her blood pressure is wonderfully controlled on the combination amlodipine-benazepril. Her lipids are at goal on pravastatin, and she continues on aspirin.    Yuri Smith MD  Cardiologist, [facility]     "

## 2017-08-23 NOTE — MR AVS SNAPSHOT
"        Lauren Jiang   2017 3:00 PM   Office Visit   MRN: [id number]    Department:  [facility]   Dept Phone:  [phone]    Description:  Female : 1950   Provider:  Yuri Smith M.D.           Reason for Visit     New Patient           Allergies as of 2017     No Known Allergies      Vital Signs     Blood Pressure Pulse Height Weight Body Mass Index Oxygen Saturation    106/68 mmHg 78 1.651 m (5' 5\") 75.751 kg (167 lb) 27.79 kg/m2 95%    Smoking Status                   Former Smoker           Basic Information     Date Of Birth Sex Race Ethnicity Preferred Language    1950 Female White Non- English      Your appointments     Sep 19, 2017 10:05 AM   BONE DENSITY ([facility]ASCAN) [facility]   [facility] ([address])    [address] [place] [id number]   [phone]           No calcium supplements 24 hours prior to exam, including antacids or multivitamins w/calcium.  Pt may eat and drink normally.  No injection procedures prior to Dexa on the same day.  No barium contrast, no CTs with IV or oral contrast, no Pet/CTs and no Nuc Med injections for 7 days prior to a Dexa (including Barium Swallow, Upper GI, Small Bowel Series).  If scheduling a Dexa on the same day as a CT with IV or oral contrast, any test with barium, Pet/CT or a Nuc Med with injection, always schedule the Dexa before the other study.              Problem List              ICD-10-CM Priority Class Noted - Resolved    History of breast cancer Z85.3 Low  2016 - Present    Essential hypertension I10   2016 - Present    Glaucoma H40.9   2016 - Present    Hyperlipidemia LDL goal <100 E78.5 Low  2016 - Present    Hot flashes R23.2   2016 - Present    Osteopenia M85.80   2016 - Present    Decreased GFR R94.4   2016 - Present    Fatigue R53.83   2016 - Present    CKD (chronic kidney disease), stage II N18.2 Low  2017 - Present    HTN " (hypertension) I10 Low  8/16/2017 - Present      Health Maintenance        Date Due Completion Dates    IMM ZOSTER VACCINE 6/18/2011 ---    BONE DENSITY 6/18/2016 ---    IMM PNEUMOCOCCAL 65+ (ADULT) LOW/MEDIUM RISK SERIES (2 of 2 - PPSV23) 8/17/2017 8/17/2016    IMM INFLUENZA (1) 9/1/2017 11/22/2016, 11/7/2015    MAMMOGRAM 5/23/2018 5/23/2017, 5/13/2016, 3/18/2015, 3/18/2015, 3/17/2014    COLONOSCOPY 5/28/2020 5/28/2015    IMM DTaP/Tdap/Td Vaccine (2 - Td) 2/17/2021 2/17/2011            Current Immunizations     13-VALENT PCV PREVNAR 8/17/2016  3:25 PM    Influenza Vaccine Adult HD 11/22/2016  9:53 AM    Influenza Vaccine Quad Inj (Pf) 11/7/2015 10:34 AM    Tdap Vaccine 2/17/2011      Below and/or attached are the medications your provider expects you to take. Review all of your home medications and newly ordered medications with your provider and/or pharmacist. Follow medication instructions as directed by your provider and/or pharmacist. Please keep your medication list with you and share with your provider. Update the information when medications are discontinued, doses are changed, or new medications (including over-the-counter products) are added; and carry medication information at all times in the event of emergency situations     Allergies:  No Known Allergies          Medications  Valid as of: August 23, 2017 -  3:58 PM    Generic Name Brand Name Tablet Size Instructions for use    Amlodipine Besy-Benazepril HCl (Cap) LOTREL 5-10 MG Take 1 Cap by mouth every day.        Anastrozole (Tab) ARIMIDEX 1 MG Take 1 mg by mouth every day.        Aspirin (Tablet Delayed Response) ECOTRIN 81 MG Take 81 mg by mouth as needed (For chest pain).        Calcium Carbonate (Tab) OS-OMERO 500 1250 (500 Ca) MG Take 1,250 mg by mouth 2 times a day, with meals.        Cholecalciferol (Cap) Vitamin D 2000 units Take 1 Cap by mouth every evening.        Coenzyme Q10   Take 1 Cap by mouth every bedtime.        Multiple  Vitamins-Minerals (Tab) THERAGRAN-M  Take 1 Tab by mouth every day.        Omeprazole (CAPSULE DELAYED RELEASE) PRILOSEC 20 MG Take 1 Cap by mouth every day.        Pravastatin Sodium (Tab) PRAVACHOL 20 MG Take 1 Tab by mouth every bedtime.        .                 Medicines prescribed today were sent to:     [id number] - [place], [place] - [address]    [address] [facility] [address]    Phone: [phone] Fax: [phone]    Open 24 Hours?: No      Medication refill instructions:       If your prescription bottle indicates you have medication refills left, it is not necessary to call your provider’s office. Please contact your pharmacy and they will refill your medication.    If your prescription bottle indicates you do not have any refills left, you may request refills at any time through one of the following ways: The online [employer] system (except Urgent Care), by calling your provider’s office, or by asking your pharmacy to contact your provider’s office with a refill request. Medication refills are processed only during regular business hours and may not be available until the next business day. Your provider may request additional information or to have a follow-up visit with you prior to refilling your medication.   *Please Note: Medication refills are assigned a new Rx number when refilled electronically. Your pharmacy may indicate that no refills were authorized even though a new prescription for the same medication is available at the pharmacy. Please request the medicine by name with the pharmacy before contacting your provider for a refill.           [employer] Status: Patient Declined

## 2017-08-23 NOTE — PROGRESS NOTES
Name:          Lauren Jiang   YOB: 1950  Date:     8/23/2017      Cierra Dozier, ALEXANDRE.PRaphaelN.  [address]  [place] [address]     Yuri Smith MD  [address]  [place] [address]  Phone: [phone]  Back Line: [phone]  Fax: [phone]  E-mail: [email or web address]   Dear Dr. Dozier,    We had the pleasure of seeing your patient, Lauren Jiang, in Cardiology Clinic at [facility] today.    As you know, she is a 67-year-old woman with a history of breast cancer in remission, and GERD recently (8/16/2017) admitted to the hospital for chest pain concerning for angina in the setting of a prior workup that was negative for the same including a cardiac catheterization 16 years ago.    The chest pain that she describes to me today does sound more likely gastrointestinal in etiology in conjunction with her hiatal hernia. I review the results of her myocardial perfusion imaging just about a week ago at this point that was negative for ischemia. I discussed with her that if she does not get relief from her proton pump inhibitor I would consider a CT coronary calcium scan as an alternative to look for atherosclerosis that could explain a false negative myocardial perfusion imaging scan. At this point, I have recommended no additional cardiovascular workup. Her blood pressure is wonderfully controlled on the combination amlodipine-benazepril. Her lipids are at goal on pravastatin, and she continues on aspirin.    We will have the patient follow-up in 6 months, sooner if she does not relief from her PPI and additional testing is needed.    Thank you for the referral and please do not hesitate to contact me at any time. My contact information is listed above.    This note was dictated using Dragon speech recognition software.     A full note including my physical examination and a full list of rectified medications is available in our medical record, and  can be faxed as well.    Yuri Smith MD  Cardiologist  [facility]

## 2017-08-23 NOTE — LETTER
Name:          Lauren Jiang   YOB: 1950  Date:     8/23/2017      Cierra Dozier, ALEXANDRE.PRaphaelN.  [address]  [place] [address]     Yuri Smith MD  [address]  [place] [address]  Phone: [phone]  Back Line: [phone]  Fax: [phone]  E-mail: [email or web address]   Dear Dr. Dozier,    We had the pleasure of seeing your patient, Lauren Jiang, in Cardiology Clinic at [facility] today.    As you know, she is a 67-year-old woman with a history of breast cancer in remission, and GERD recently (8/16/2017) admitted to the hospital for chest pain concerning for angina in the setting of a prior workup that was negative for the same including a cardiac catheterization 16 years ago.    The chest pain that she describes to me today does sound more likely gastrointestinal in etiology in conjunction with her hiatal hernia. I review the results of her myocardial perfusion imaging just about a week ago at this point that was negative for ischemia. I discussed with her that if she does not get relief from her proton pump inhibitor I would consider a CT coronary calcium scan as an alternative to look for atherosclerosis that could explain a false negative myocardial perfusion imaging scan. At this point, I have recommended no additional cardiovascular workup. Her blood pressure is wonderfully controlled on the combination amlodipine-benazepril. Her lipids are at goal on pravastatin, and she continues on aspirin.    We will have the patient follow-up in 6 months, sooner if she does not relief from her PPI and additional testing is needed.    Thank you for the referral and please do not hesitate to contact me at any time. My contact information is listed above.    This note was dictated using Dragon speech recognition software.     A full note including my physical examination and a full list of rectified medications is available in our medical record, and  can be faxed as well.    Yuri Smith MD  Cardiologist  [facility]

## 2017-09-19 ENCOUNTER — HOSPITAL ENCOUNTER (OUTPATIENT)
Dept: RADIOLOGY | Facility: MEDICAL CENTER | Age: 67
End: 2017-09-19
Attending: NURSE PRACTITIONER
Payer: MEDICARE

## 2017-09-19 DIAGNOSIS — Z78.0 MENOPAUSE: ICD-10-CM

## 2017-09-19 PROCEDURE — 77080 DXA BONE DENSITY AXIAL: CPT

## 2017-09-20 ENCOUNTER — TELEPHONE (OUTPATIENT)
Dept: MEDICAL GROUP | Facility: LAB | Age: 67
End: 2017-09-20

## 2017-09-20 NOTE — TELEPHONE ENCOUNTER
----- Message from CHIDI Her sent at 9/19/2017  5:39 PM PDT -----  Please call Lauren - bone density shows osteopenia (early bone loss).  Important to take calcium 1200 mg and 2000 IU vit D daily.  Very important to get weight bearing exercise such as walking / light weight lifting.  We should recheck this in 2 years.

## 2017-10-30 ENCOUNTER — APPOINTMENT (OUTPATIENT)
Dept: SOCIAL WORK | Facility: CLINIC | Age: 67
End: 2017-10-30
Payer: MEDICARE

## 2017-10-30 PROCEDURE — 90736 HZV VACCINE LIVE SUBQ: CPT | Performed by: REGISTERED NURSE

## 2017-10-30 PROCEDURE — 90662 IIV NO PRSV INCREASED AG IM: CPT | Performed by: REGISTERED NURSE

## 2017-10-30 PROCEDURE — G0008 ADMIN INFLUENZA VIRUS VAC: HCPCS | Performed by: REGISTERED NURSE

## 2017-10-30 PROCEDURE — 90472 IMMUNIZATION ADMIN EACH ADD: CPT | Performed by: REGISTERED NURSE

## 2017-11-10 LAB — EKG IMPRESSION: NORMAL

## 2018-01-05 DIAGNOSIS — K21.9 GASTROESOPHAGEAL REFLUX DISEASE WITHOUT ESOPHAGITIS: ICD-10-CM

## 2018-01-05 RX ORDER — OMEPRAZOLE 20 MG/1
CAPSULE, DELAYED RELEASE ORAL
Qty: 90 CAP | Refills: 2 | Status: SHIPPED | OUTPATIENT
Start: 2018-01-05 | End: 2019-08-14

## 2018-02-09 ENCOUNTER — PATIENT OUTREACH (OUTPATIENT)
Dept: HEALTH INFORMATION MANAGEMENT | Facility: OTHER | Age: 68
End: 2018-02-09

## 2018-02-09 NOTE — PROGRESS NOTES
Attempt #:FINAL/ PT DECLINED [facility] WILL CALL TO SCHEDULE      [facility] Verified: yes  Verify PCP: yes    Communication Preference Obtained: no     Review Care Team: no    Annual Wellness Visit Scheduling  1. Scheduling Status:Not Scheduled. Patient states they are not interested       Care Gap Scheduling (Attempt to Schedule EACH Overdue Care Gap!)  Health Maintenance Due   Topic Date Due   • Annual Wellness Visit  1950   • IMM PNEUMOCOCCAL 65+ (ADULT) LOW/MEDIUM RISK SERIES (2 of 2 - PPSV23) 08/17/2017         [employer] Activation: declined  [id number]}

## 2018-04-04 ENCOUNTER — HOSPITAL ENCOUNTER (OUTPATIENT)
Dept: LAB | Facility: MEDICAL CENTER | Age: 68
End: 2018-04-04
Attending: SPECIALIST
Payer: MEDICARE

## 2018-04-04 DIAGNOSIS — I10 ESSENTIAL HYPERTENSION: ICD-10-CM

## 2018-04-04 LAB
ALBUMIN SERPL BCP-MCNC: 4.4 G/DL (ref 3.2–4.9)
ALBUMIN/GLOB SERPL: 1.7 G/DL
ALP SERPL-CCNC: 52 U/L (ref 30–99)
ALT SERPL-CCNC: 29 U/L (ref 2–50)
ANION GAP SERPL CALC-SCNC: 6 MMOL/L (ref 0–11.9)
AST SERPL-CCNC: 22 U/L (ref 12–45)
BILIRUB SERPL-MCNC: 0.6 MG/DL (ref 0.1–1.5)
BUN SERPL-MCNC: 17 MG/DL (ref 8–22)
CALCIUM SERPL-MCNC: 10.1 MG/DL (ref 8.5–10.5)
CHLORIDE SERPL-SCNC: 105 MMOL/L (ref 96–112)
CO2 SERPL-SCNC: 28 MMOL/L (ref 20–33)
CREAT SERPL-MCNC: 1.09 MG/DL (ref 0.5–1.4)
GLOBULIN SER CALC-MCNC: 2.6 G/DL (ref 1.9–3.5)
GLUCOSE SERPL-MCNC: 87 MG/DL (ref 65–99)
POTASSIUM SERPL-SCNC: 4.2 MMOL/L (ref 3.6–5.5)
PROT SERPL-MCNC: 7 G/DL (ref 6–8.2)
SODIUM SERPL-SCNC: 139 MMOL/L (ref 135–145)

## 2018-04-04 PROCEDURE — 80053 COMPREHEN METABOLIC PANEL: CPT

## 2018-04-04 PROCEDURE — 36415 COLL VENOUS BLD VENIPUNCTURE: CPT

## 2018-04-04 PROCEDURE — 85025 COMPLETE CBC W/AUTO DIFF WBC: CPT

## 2018-04-04 RX ORDER — AMLODIPINE BESYLATE AND BENAZEPRIL HYDROCHLORIDE 5; 10 MG/1; MG/1
CAPSULE ORAL
Qty: 90 CAP | Refills: 2 | Status: SHIPPED | OUTPATIENT
Start: 2018-04-04 | End: 2019-02-07 | Stop reason: SDUPTHER

## 2018-04-05 LAB
BASOPHILS # BLD AUTO: 0.7 % (ref 0–1.8)
BASOPHILS # BLD: 0.04 K/UL (ref 0–0.12)
EOSINOPHIL # BLD AUTO: 0.15 K/UL (ref 0–0.51)
EOSINOPHIL NFR BLD: 2.6 % (ref 0–6.9)
ERYTHROCYTE [DISTWIDTH] IN BLOOD BY AUTOMATED COUNT: 42.6 FL (ref 35.9–50)
HCT VFR BLD AUTO: 47.5 % (ref 37–47)
HGB BLD-MCNC: 15 G/DL (ref 12–16)
IMM GRANULOCYTES # BLD AUTO: 0.03 K/UL (ref 0–0.11)
IMM GRANULOCYTES NFR BLD AUTO: 0.5 % (ref 0–0.9)
LYMPHOCYTES # BLD AUTO: 1.44 K/UL (ref 1–4.8)
LYMPHOCYTES NFR BLD: 25.4 % (ref 22–41)
MCH RBC QN AUTO: 29.2 PG (ref 27–33)
MCHC RBC AUTO-ENTMCNC: 31.6 G/DL (ref 33.6–35)
MCV RBC AUTO: 92.4 FL (ref 81.4–97.8)
MONOCYTES # BLD AUTO: 0.47 K/UL (ref 0–0.85)
MONOCYTES NFR BLD AUTO: 8.3 % (ref 0–13.4)
NEUTROPHILS # BLD AUTO: 3.54 K/UL (ref 2–7.15)
NEUTROPHILS NFR BLD: 62.5 % (ref 44–72)
NRBC # BLD AUTO: 0 K/UL
NRBC BLD-RTO: 0 /100 WBC
PLATELET # BLD AUTO: 281 K/UL (ref 164–446)
PMV BLD AUTO: 9.8 FL (ref 9–12.9)
RBC # BLD AUTO: 5.14 M/UL (ref 4.2–5.4)
WBC # BLD AUTO: 5.7 K/UL (ref 4.8–10.8)

## 2018-04-11 ENCOUNTER — OFFICE VISIT (OUTPATIENT)
Dept: MEDICAL GROUP | Facility: PHYSICIAN GROUP | Age: 68
End: 2018-04-11
Payer: MEDICARE

## 2018-04-11 ENCOUNTER — HOSPITAL ENCOUNTER (OUTPATIENT)
Facility: MEDICAL CENTER | Age: 68
End: 2018-04-11
Attending: FAMILY MEDICINE
Payer: MEDICARE

## 2018-04-11 VITALS
HEART RATE: 107 BPM | DIASTOLIC BLOOD PRESSURE: 76 MMHG | TEMPERATURE: 98.3 F | SYSTOLIC BLOOD PRESSURE: 132 MMHG | WEIGHT: 171.4 LBS | HEIGHT: 65 IN | RESPIRATION RATE: 18 BRPM | BODY MASS INDEX: 28.56 KG/M2 | OXYGEN SATURATION: 95 %

## 2018-04-11 DIAGNOSIS — R82.90 ABNORMAL URINALYSIS: ICD-10-CM

## 2018-04-11 DIAGNOSIS — N94.9 VAGINAL BURNING: ICD-10-CM

## 2018-04-11 DIAGNOSIS — K59.09 OTHER CONSTIPATION: ICD-10-CM

## 2018-04-11 PROBLEM — N94.89 VAGINAL BURNING: Status: ACTIVE | Noted: 2018-04-11

## 2018-04-11 PROCEDURE — 87660 TRICHOMONAS VAGIN DIR PROBE: CPT

## 2018-04-11 PROCEDURE — 87510 GARDNER VAG DNA DIR PROBE: CPT

## 2018-04-11 PROCEDURE — 99214 OFFICE O/P EST MOD 30 MIN: CPT | Performed by: FAMILY MEDICINE

## 2018-04-11 PROCEDURE — 87480 CANDIDA DNA DIR PROBE: CPT

## 2018-04-11 RX ORDER — NITROFURANTOIN 25; 75 MG/1; MG/1
100 CAPSULE ORAL 2 TIMES DAILY
Qty: 10 CAP | Refills: 0 | Status: SHIPPED | OUTPATIENT
Start: 2018-04-11 | End: 2018-04-16

## 2018-04-11 RX ORDER — EXEMESTANE 25 MG/1
TABLET ORAL
COMMUNITY
Start: 2018-04-09 | End: 2019-08-14

## 2018-04-11 ASSESSMENT — PATIENT HEALTH QUESTIONNAIRE - PHQ9: CLINICAL INTERPRETATION OF PHQ2 SCORE: 0

## 2018-04-12 DIAGNOSIS — N94.9 VAGINAL BURNING: ICD-10-CM

## 2018-04-12 NOTE — ASSESSMENT & PLAN NOTE
She noted vaginal burning with a pink tinged discharge yesterday.  She describes a perineal pain that is constant does not worsen with urination.  She denies itching.  No new hygiene products.  She denies feeling ill or skin rashes.  She started exemestane yesterday after switching to that from anastrozole.

## 2018-04-12 NOTE — ASSESSMENT & PLAN NOTE
She has been constipated for two weeks.  She drinks plenty of fluid, up to 80 oz daily.  She has been taking over the counter senna and colace with some improvement but it has not resolved.

## 2018-04-12 NOTE — PROGRESS NOTES
CC:vaginal burning    HISTORY OF PRESENT ILLNESS: Patient is a 67 y.o. female established patient who presents today to discuss the following    Health Maintenance: needs AWV    Vaginal burning  She noted vaginal burning with a pink tinged discharge yesterday.  She describes a perineal pain that is constant does not worsen with urination.  She denies itching.  No new hygiene products.  She denies feeling ill or skin rashes.  She started exemestane yesterday after switching to that from anastrozole.    Other constipation  She has been constipated for two weeks.  She drinks plenty of fluid, up to 80 oz daily.  She has been taking over the counter senna and colace with some improvement but it has not resolved.      Patient Active Problem List    Diagnosis Date Noted   • CKD (chronic kidney disease), stage II 08/16/2017     Priority: Low   • History of breast cancer 01/12/2016     Priority: Low   • Hyperlipidemia LDL goal <100 01/12/2016     Priority: Low   • Other constipation 04/11/2018   • Vaginal burning 04/11/2018   • Overweight(278.02) 08/23/2017   • Decreased GFR 08/30/2016   • Fatigue 08/30/2016   • Essential hypertension 01/12/2016   • Glaucoma 01/12/2016   • Hot flashes 01/12/2016   • Osteopenia 01/12/2016      Allergies:Patient has no known allergies.    Current Outpatient Prescriptions   Medication Sig Dispense Refill   • exemestane (AROMASIN) 25 MG tablet      • nitrofurantoin monohydr macro (MACROBID) 100 MG Cap Take 1 Cap by mouth 2 times a day for 5 days. 10 Cap 0   • amlodipine-benazepril (LOTREL) 5-10 MG per capsule TAKE 1 CAP BY MOUTH EVERYDAY. 90 Cap 2   • omeprazole (PRILOSEC) 20 MG delayed-release capsule TAKE 1 CAPSULE BY MOUTH EVERY EVENING 90 Cap 2   • Cholecalciferol (VITAMIN D) 2000 UNITS Cap Take 1 Cap by mouth every evening.     • therapeutic multivitamin-minerals (THERAGRAN-M) Tab Take 1 Tab by mouth every day.     • aspirin EC (ECOTRIN) 81 MG Tablet Delayed Response Take 81 mg by mouth  "as needed (For chest pain).     • pravastatin (PRAVACHOL) 20 MG Tab Take 1 Tab by mouth every bedtime. 90 Tab 2   • calcium carbonate (OS-OMERO 500) 1250 MG Tab Take 1,250 mg by mouth 2 times a day, with meals.     • Coenzyme Q10 (CO Q 10 PO) Take 1 Cap by mouth every bedtime.       No current facility-administered medications for this visit.        Social History   Substance Use Topics   • Smoking status: Former Smoker     Packs/day: 0.25     Years: 10.00   • Smokeless tobacco: Never Used   • Alcohol use 4.2 oz/week     7 Glasses of wine per week     Social History     Social History Narrative   • No narrative on file       Family History   Problem Relation Age of Onset   • Hypertension Father    • Heart Disease Father    • Heart Attack Father 60     MI, CABG   • Cancer Sister      breast / possible uterine also (Dr. Mendoza)   • Hypertension Mother    • Lung Disease Mother      smoker   • Arthritis Mother      RA   • Stroke Mother    • Diabetes Maternal Uncle      type I   • Cancer Maternal Grandmother      Colon   • Diabetes Maternal Grandfather      type II       Review of Systems:      - Constitutional: Negative for fever, chills, unexpected weight change, and fatigue/generalized weakness.     - Gastrointestinal: Negative for heartburn, nausea, vomiting, abdominal pain, hematochezia, melena, diarrhea, constipation, and greasy/foul-smelling stools.     - Genitourinary: Negative for hematuria, pyuria, urinary urgency, and urinary incontinence.    - Musculoskeletal: Negative for myalgias, back pain, and joint pain.     - Skin: Negative for rash, itching, cyanotic skin color change.         Exam:    Blood pressure 132/76, pulse (!) 107, temperature 36.8 °C (98.3 °F), resp. rate 18, height 1.651 m (5' 5\"), weight 77.7 kg (171 lb 6.4 oz), SpO2 95 %. Body mass index is 28.52 kg/m².    General:  Well nourished, well developed female in NAD  Head is grossly normal.  PELVIC EXAMINATION    Chaperone Breanne Yeoman    Labia: " normal, erythema is present in a symmetric fashion in the gluteal cleft and the perineum without lesions   Urethral Orifice: normal  Vagina: vaginal canal clear, mildly erythematou, no lesions  Cervix: No polyps, masses or lesions noted. nodischarge noted.       Please note that this dictation was created using voice recognition software. I have made every reasonable attempt to correct obvious errors, but I expect that there are errors of grammar and possibly content that I did not discover before finalizing the note.    Assessment/Plan:  1. Other constipation  She has had some functional constipation. The management of constipation was discussed including use of over-the-counter stool softeners, increasing fluids and considering magnesium.    2. Vaginal burning  Her vaginal burning appears to be peroneal. The differential includes vaginitis from a yeast infection versus contact dermatitis or an idiopathic cause or urinary tract infection. She may use topical antifungals in the meantime as her exam was not classic for a yeast infection while her tests are pending. If all tests are negative. ABG reasonable to consider a topical low-dose corticosteroid.  - VAGINAL PATHOGENS DNA PANEL; Future    3. Abnormal urinalysis  She does have an abnormal urinalysis with large blood and leukocytes. This may suggest a urinary tract infection and empiric treatment has been ordered. Culture is pending. If it is negative the changes would likely be due to the perineal irritation.  - URINE CULTURE(NEW); Future  - nitrofurantoin monohydr macro (MACROBID) 100 MG Cap; Take 1 Cap by mouth 2 times a day for 5 days.  Dispense: 10 Cap; Refill: 0

## 2018-04-13 LAB
CANDIDA DNA VAG QL PROBE+SIG AMP: NEGATIVE
G VAGINALIS DNA VAG QL PROBE+SIG AMP: NEGATIVE
T VAGINALIS DNA VAG QL PROBE+SIG AMP: NEGATIVE

## 2018-04-16 ENCOUNTER — TELEPHONE (OUTPATIENT)
Dept: MEDICAL GROUP | Facility: PHYSICIAN GROUP | Age: 68
End: 2018-04-16

## 2018-04-17 ENCOUNTER — TELEPHONE (OUTPATIENT)
Dept: MEDICAL GROUP | Facility: PHYSICIAN GROUP | Age: 68
End: 2018-04-17

## 2018-04-17 NOTE — TELEPHONE ENCOUNTER
----- Message from Yareli Farr M.D. sent at 4/16/2018  7:55 PM PDT -----  These results are normal, She is not active on MyChart.  Please let her know that her test for fungal or other vaginal infections is negative.  The urine culture was not done, and if she is still having symptoms it is reasonable to have it done.  We can see her in follow up if this is negative as well.  Thanks

## 2018-04-20 NOTE — PROGRESS NOTES
Outcome: Left Message    Please transfer to Patient Outreach Team at [phone] when patient returns call.    WebIZ Checked & Epic Updated:  no    HealthConnect Verified: no    Attempt # FINAL/DECLINED LIST

## 2018-05-24 ENCOUNTER — HOSPITAL ENCOUNTER (OUTPATIENT)
Dept: RADIOLOGY | Facility: MEDICAL CENTER | Age: 68
End: 2018-05-24
Attending: SURGERY
Payer: MEDICARE

## 2018-05-24 DIAGNOSIS — Z12.31 SCREENING MAMMOGRAM, ENCOUNTER FOR: ICD-10-CM

## 2018-05-24 PROCEDURE — 77063 BREAST TOMOSYNTHESIS BI: CPT

## 2018-06-07 NOTE — PROGRESS NOTES
1. Attempt #: 1    2. HealthConnect Verified: yes    3. Verify PCP: yes    4. Care Team Updated:       •   DME Company (gait device, O2, CPAP, etc.): YES       •   Other Specialists (eye doctor, derm, GYN, cardiology, endo, etc): YES    5.  Reviewed/Updated the following with patient:       •   Communication Preference Obtained? YES       •   Preferred Pharmacy? YES       •   Preferred Lab? YES       •   Family History (document living status of immediate family members and if + hx of cancer, diabetes, hypertension, hyperlipidemia, heart attack, stroke) YES. Was Abstract Encounter opened and chart updated? YES    6. [employer] Activation: declined    7. [employer] Mike: no    8. Annual Wellness Visit Scheduling  Scheduling Status:Scheduled      9. Care Gap Scheduling (Attempt to Schedule EACH Overdue Care Gap!)     Health Maintenance Due   Topic Date Due   • Annual Wellness Visit  1950   • IMM PNEUMOCOCCAL 65+ (ADULT) LOW/MEDIUM RISK SERIES (2 of 2 - PPSV23) 08/17/2017        Scheduled patient for Annual Wellness Visit and Immunizations: PNEUMOVAX (PPSV23)    10. Patient was advised: “This is a free wellness visit. The provider will screen for medical conditions to help you stay healthy. If you have other concerns to address you may be asked to discuss these at a separate visit or there may be an additional fee.”     11. Patient was informed to arrive 15 min prior to their scheduled appointment and bring in their medication bottles.

## 2018-06-08 ENCOUNTER — TELEPHONE (OUTPATIENT)
Dept: MEDICAL GROUP | Facility: LAB | Age: 68
End: 2018-06-08

## 2018-06-08 NOTE — TELEPHONE ENCOUNTER
PVP WITH OUTREACH  Future Appointments       Provider Department Center    6/13/2018 9:00 AM CHIDI Reyna; [facility]  [facility]           ANNUAL WELLNESS VISIT PRE-VISIT PLANNING     1.  Immunizations were updated in Epic using WebIZ?: Epic matches WebIZ       •  WebIZ Recommendations: PNEUMOVAX (PPSV23)       •  Is patient due for Tdap? NO       •  Is patient due for Shingles? NO     2.  Specialty Comments was updated with diagnosis information provided by [facility]: YES MDX printed.

## 2018-06-13 ENCOUNTER — OFFICE VISIT (OUTPATIENT)
Dept: MEDICAL GROUP | Facility: LAB | Age: 68
End: 2018-06-13
Payer: MEDICARE

## 2018-06-13 VITALS
BODY MASS INDEX: 27.58 KG/M2 | HEART RATE: 82 BPM | SYSTOLIC BLOOD PRESSURE: 118 MMHG | OXYGEN SATURATION: 93 % | DIASTOLIC BLOOD PRESSURE: 72 MMHG | WEIGHT: 171.6 LBS | HEIGHT: 66 IN | TEMPERATURE: 97.8 F | RESPIRATION RATE: 12 BRPM

## 2018-06-13 DIAGNOSIS — N94.9 VAGINAL BURNING: ICD-10-CM

## 2018-06-13 DIAGNOSIS — N39.0 RECURRENT UTI: ICD-10-CM

## 2018-06-13 DIAGNOSIS — R23.2 HOT FLASHES: ICD-10-CM

## 2018-06-13 DIAGNOSIS — E78.5 HYPERLIPIDEMIA LDL GOAL <100: ICD-10-CM

## 2018-06-13 DIAGNOSIS — Z00.00 MEDICARE ANNUAL WELLNESS VISIT, SUBSEQUENT: ICD-10-CM

## 2018-06-13 DIAGNOSIS — Z85.3 HISTORY OF BREAST CANCER: ICD-10-CM

## 2018-06-13 DIAGNOSIS — Z23 NEED FOR PNEUMOCOCCAL VACCINATION: ICD-10-CM

## 2018-06-13 DIAGNOSIS — M85.89 OSTEOPENIA OF MULTIPLE SITES: ICD-10-CM

## 2018-06-13 DIAGNOSIS — N18.2 CKD (CHRONIC KIDNEY DISEASE), STAGE II: ICD-10-CM

## 2018-06-13 DIAGNOSIS — E66.3 OVERWEIGHT (BMI 25.0-29.9): ICD-10-CM

## 2018-06-13 DIAGNOSIS — H40.10X0 OPEN-ANGLE GLAUCOMA OF RIGHT EYE, UNSPECIFIED GLAUCOMA STAGE, UNSPECIFIED OPEN-ANGLE GLAUCOMA TYPE: ICD-10-CM

## 2018-06-13 DIAGNOSIS — K59.09 OTHER CONSTIPATION: ICD-10-CM

## 2018-06-13 DIAGNOSIS — I10 ESSENTIAL HYPERTENSION: ICD-10-CM

## 2018-06-13 PROCEDURE — 90732 PPSV23 VACC 2 YRS+ SUBQ/IM: CPT | Performed by: NURSE PRACTITIONER

## 2018-06-13 PROCEDURE — G0009 ADMIN PNEUMOCOCCAL VACCINE: HCPCS | Performed by: NURSE PRACTITIONER

## 2018-06-13 PROCEDURE — G0438 PPPS, INITIAL VISIT: HCPCS | Performed by: NURSE PRACTITIONER

## 2018-06-13 ASSESSMENT — PATIENT HEALTH QUESTIONNAIRE - PHQ9: CLINICAL INTERPRETATION OF PHQ2 SCORE: 0

## 2018-06-13 ASSESSMENT — ACTIVITIES OF DAILY LIVING (ADL): BATHING_REQUIRES_ASSISTANCE: 0

## 2018-06-13 NOTE — PROGRESS NOTES
Chief Complaint   Patient presents with   • Annual Wellness Visit         HPI:  Lauren is a 67 y.o. here for Medicare Annual Wellness Visit.  She is feeling well.  She is staying active through travel.  She is followed by oncology, gastroenterology, general surgery, cardiology, dermatology and ophthalmology.      Patient Active Problem List    Diagnosis Date Noted   • CKD (chronic kidney disease), stage II 08/16/2017     Priority: Low   • History of breast cancer 01/12/2016     Priority: Low   • Hyperlipidemia LDL goal <100 01/12/2016     Priority: Low   • Other constipation 04/11/2018   • Vaginal burning 04/11/2018   • Overweight(278.02) 08/23/2017   • Decreased GFR 08/30/2016   • Fatigue 08/30/2016   • Essential hypertension 01/12/2016   • Glaucoma 01/12/2016   • Hot flashes 01/12/2016   • Osteopenia 01/12/2016       Current Outpatient Prescriptions   Medication Sig Dispense Refill   • exemestane (AROMASIN) 25 MG tablet      • amlodipine-benazepril (LOTREL) 5-10 MG per capsule TAKE 1 CAP BY MOUTH EVERYDAY. 90 Cap 2   • omeprazole (PRILOSEC) 20 MG delayed-release capsule TAKE 1 CAPSULE BY MOUTH EVERY EVENING 90 Cap 2   • Cholecalciferol (VITAMIN D) 2000 UNITS Cap Take 1 Cap by mouth every evening.     • therapeutic multivitamin-minerals (THERAGRAN-M) Tab Take 1 Tab by mouth every day.     • aspirin EC (ECOTRIN) 81 MG Tablet Delayed Response Take 81 mg by mouth as needed (For chest pain).     • pravastatin (PRAVACHOL) 20 MG Tab Take 1 Tab by mouth every bedtime. 90 Tab 2   • calcium carbonate (OS-OMERO 500) 1250 MG Tab Take 1,250 mg by mouth 2 times a day, with meals.     • Coenzyme Q10 (CO Q 10 PO) Take 1 Cap by mouth every bedtime.       No current facility-administered medications for this visit.         Patient is taking medications as noted in medication list.  Current supplements as per medication list.     Allergies: Patient has no known allergies.    Current social contact/activities: going out with adopted  children, theater, [place], sewing, crotcheting, garden, cross word puzzles     Is patient current with immunizations? No, due for PNEUMOVAX (PPSV23). Patient is interested in receiving PNEUMOVAX (PPSV23) today.    She  reports that she quit smoking about 18 years ago. Her smoking use included Cigarettes. She has a 2.50 pack-year smoking history. She has never used smokeless tobacco. She reports that she drinks about 4.2 oz of alcohol per week . She reports that she does not use drugs.  Counseling given: Yes        DPA/Advanced directive: Patient has Advanced Directive, but it is not on file. Instructed to bring in a copy to scan into their chart.    ROS:    Gait: Uses no assistive device   Ostomy: no   Other tubes: no   Amputations: no   Chronic oxygen use no   Last eye exam 4/2018   Wears hearing aids: no   : Denies any urinary leakage during the last 6 months        Depression Screening    Little interest or pleasure in doing things?  0 - not at all  Feeling down, depressed, or hopeless? 0 - not at all  Patient Health Questionnaire Score: 0    If depressive symptoms identified deferred to follow up visit unless specifically addressed in assessment and plan.    Interpretation of PHQ-9 Total Score   Score Severity   1-4 No Depression   5-9 Mild Depression   10-14 Moderate Depression   15-19 Moderately Severe Depression   20-27 Severe Depression    Screening for Cognitive Impairment    Three Minute Recall (leader, season, table)  1/3    Gael clock face with all 12 numbers and set the hands to show 10 past 11.  Yes 5/5  If cognitive concerns identified, deferred for follow up unless specifically addressed in assessment and plan.    Fall Risk Assessment    Has the patient had two or more falls in the last year or any fall with injury in the last year?  No  If fall risk identified, deferred for follow up unless specifically addressed in assessment and plan.    Safety Assessment    Throw rugs on floor.  Yes  Handrails  on all stairs.  Yes  Good lighting in all hallways.  Yes  Difficulty hearing.  No  Patient counseled about all safety risks that were identified.    Functional Assessment ADLs    Are there any barriers preventing you from cooking for yourself or meeting nutritional needs?  No.    Are there any barriers preventing you from driving safely or obtaining transportation?  No.    Are there any barriers preventing you from using a telephone or calling for help?  No.    Are there any barriers preventing you from shopping?  No.    Are there any barriers preventing you from taking care of your own finances?  No.    Are there any barriers preventing you from managing your medications?  No.    Are there any barriers preventing you from showering, bathing or dressing yourself?  No.    Are you currently engaging in any exercise or physical activity?  Yes.  Plays with dog, moderate exercise, walking.  What is your perception of your health?   .    Health Maintenance Summary                Annual Wellness Visit Overdue 1950     IMM PNEUMOCOCCAL 65+ (ADULT) LOW/MEDIUM RISK SERIES Overdue 8/17/2017      Done 8/17/2016 Imm Admin: Pneumococcal Conjugate Vaccine (Prevnar/PCV-13)    MAMMOGRAM Next Due 5/24/2019      Done 5/24/2018 MA-MAMMO SCREENING BILAT W/TOMOSYNTHESIS W/CAD     Patient has more history with this topic...    COLONOSCOPY Next Due 5/28/2020      Done 5/28/2015 AMB REFERRAL TO GI FOR COLONOSCOPY    IMM DTaP/Tdap/Td Vaccine Next Due 2/17/2021      Done 2/17/2011 Imm Admin: Tdap Vaccine    BONE DENSITY Next Due 9/19/2022      Done 9/19/2017 DS-BONE DENSITY STUDY (DEXA)          Patient Care Team:  CHIDI Reyna as PCP - General (Family Medicine)  Irma Garg M.D. as Consulting Physician (Surgery)  VALENTÍN Jay M.D. as Consulting Physician (Oncology)  Gilberto Garcia M.D. as Consulting Physician (Ophthalmology)  Jagdish Crum Jr., M.D. as Consulting Physician (Gastroenterology)  Yuri Smith  "M.D. as Consulting Physician (Cardiology)  Christine Carey M.D. as Consulting Physician (Dermatology)    Social History   Substance Use Topics   • Smoking status: Former Smoker     Packs/day: 0.25     Years: 10.00     Types: Cigarettes     Quit date: 1/1/2000   • Smokeless tobacco: Never Used   • Alcohol use 4.2 oz/week     7 Glasses of wine per week     Family History   Problem Relation Age of Onset   • Hypertension Father    • Heart Disease Father    • Cancer Sister       hadbreast / possible uterine also (Dr. Mendoza)   • Hypertension Mother    • Lung Disease Mother      smoker   • Arthritis Mother      RA   • Stroke Mother    • Diabetes Maternal Uncle      type I   • Cancer Maternal Grandmother      Colon   • Diabetes Maternal Grandfather      type II     She  has a past medical history of Cancer (HCC); Gallstones; Glaucoma; Hyperlipidemia; and Hypertension.   Past Surgical History:   Procedure Laterality Date   • HYSTERECTOMY LAPAROSCOPY  53 y/o    cervix and bilat ovaries removed.   • KNEE ARTHROSCOPY  58 y/o    tripped over baby gate   • MASTECTOMY  age 62    right mastectomy   • TONSILLECTOMY AND ADENOIDECTOMY  7 y/o           Exam:     Blood pressure 118/72, pulse 82, temperature 36.6 °C (97.8 °F), resp. rate 12, height 1.664 m (5' 5.5\"), weight 77.8 kg (171 lb 9.6 oz), SpO2 93 %. Body mass index is 28.12 kg/m².    Hearing excellent.    Dentition good  Alert, oriented in no acute distress.  Eye contact is good, speech goal directed, affect calm      Assessment and Plan. The following treatment and monitoring plan is recommended:   1. Medicare annual wellness visit, subsequent    2. Need for pneumococcal vaccination  -I have placed the below orders and discussed them with Dr. Solis. the MA is performing the below orders under the direction of Dr. FERRO  - Pneumococal Polysaccharide Vaccine 23-Valent =>1yo SQ/IM    3. History of breast cancer  -Stable.  Changed from tamoxifen to exemestane because of joint pain " which has resolved.  Seeing Dr. Jay 6/25 for possible PET order . Will notify me and we can add CT cardiac scoring.     4. Hyperlipidemia LDL goal <100  -stable.  Due for updated labs.  She would like to have a CT calcium scoring and will add this onto PET scan imaging day if she is going to be scheduled for a PET scan.    5. CKD (chronic kidney disease), stage II  -stable.  Will update labs next month    6. Essential hypertension  -stable/controlled.  Continue same.      7. Open-angle glaucoma of right eye, unspecified glaucoma stage, unspecified open-angle glaucoma type  -stable and followed by Dr. Garcia    8. Hot flashes  -improved with change to exemestane.     9. Osteopenia of multiple sites  -stable.  T score -1.4.  Taking calcium / vit D and gardening / dog walking of exercise.      10. Other constipation  -intermittent / stable.     11. Vaginal burning  -resolved.  Encouraged her to return for a GYN exam in the next 1-2 months.    12. Overweight (BMI 25.0-29.9)  -improved.         Services suggested: No services needed at this time  Health Care Screening recommendations as per orders if indicated.  Referrals offered: PT/OT/Nutrition counseling/Behavioral Health/Smoking cessation as per orders if indicated.    Discussion today about general wellness and lifestyle habits:    · Prevent falls and reduce trip hazards; Cautioned about securing or removing rugs.  · Have a working fire alarm and carbon monoxide detector;   · Engage in regular physical activity and social activities       Follow-up: No Follow-up on file.

## 2018-06-20 ENCOUNTER — HOSPITAL ENCOUNTER (OUTPATIENT)
Dept: LAB | Facility: MEDICAL CENTER | Age: 68
End: 2018-06-20
Attending: SPECIALIST
Payer: MEDICARE

## 2018-06-20 ENCOUNTER — HOSPITAL ENCOUNTER (OUTPATIENT)
Dept: LAB | Facility: MEDICAL CENTER | Age: 68
End: 2018-06-20
Attending: NURSE PRACTITIONER
Payer: MEDICARE

## 2018-06-20 DIAGNOSIS — N39.0 RECURRENT UTI: ICD-10-CM

## 2018-06-20 DIAGNOSIS — E78.5 HYPERLIPIDEMIA LDL GOAL <100: ICD-10-CM

## 2018-06-20 LAB
ALBUMIN SERPL BCP-MCNC: 4.3 G/DL (ref 3.2–4.9)
ALBUMIN/GLOB SERPL: 1.8 G/DL
ALP SERPL-CCNC: 57 U/L (ref 30–99)
ALT SERPL-CCNC: 23 U/L (ref 2–50)
ANION GAP SERPL CALC-SCNC: 11 MMOL/L (ref 0–11.9)
APPEARANCE UR: CLEAR
AST SERPL-CCNC: 20 U/L (ref 12–45)
BACTERIA #/AREA URNS HPF: NEGATIVE /HPF
BASOPHILS # BLD AUTO: 0.9 % (ref 0–1.8)
BASOPHILS # BLD AUTO: 0.9 % (ref 0–1.8)
BASOPHILS # BLD: 0.05 K/UL (ref 0–0.12)
BASOPHILS # BLD: 0.05 K/UL (ref 0–0.12)
BILIRUB SERPL-MCNC: 0.7 MG/DL (ref 0.1–1.5)
BILIRUB UR QL STRIP.AUTO: NEGATIVE
BUN SERPL-MCNC: 16 MG/DL (ref 8–22)
BURR CELLS BLD QL SMEAR: NORMAL
CALCIUM SERPL-MCNC: 9.5 MG/DL (ref 8.5–10.5)
CHLORIDE SERPL-SCNC: 106 MMOL/L (ref 96–112)
CHOLEST SERPL-MCNC: 142 MG/DL (ref 100–199)
CO2 SERPL-SCNC: 25 MMOL/L (ref 20–33)
COLOR UR: YELLOW
CREAT SERPL-MCNC: 1.2 MG/DL (ref 0.5–1.4)
EOSINOPHIL # BLD AUTO: 0.15 K/UL (ref 0–0.51)
EOSINOPHIL # BLD AUTO: 0.25 K/UL (ref 0–0.51)
EOSINOPHIL NFR BLD: 2.6 % (ref 0–6.9)
EOSINOPHIL NFR BLD: 4.4 % (ref 0–6.9)
EPI CELLS #/AREA URNS HPF: NORMAL /HPF
ERYTHROCYTE [DISTWIDTH] IN BLOOD BY AUTOMATED COUNT: 42.3 FL (ref 35.9–50)
ERYTHROCYTE [DISTWIDTH] IN BLOOD BY AUTOMATED COUNT: 42.5 FL (ref 35.9–50)
GLOBULIN SER CALC-MCNC: 2.4 G/DL (ref 1.9–3.5)
GLUCOSE SERPL-MCNC: 83 MG/DL (ref 65–99)
GLUCOSE UR STRIP.AUTO-MCNC: NEGATIVE MG/DL
HCT VFR BLD AUTO: 46.7 % (ref 37–47)
HCT VFR BLD AUTO: 47.2 % (ref 37–47)
HDLC SERPL-MCNC: 53 MG/DL
HGB BLD-MCNC: 14.7 G/DL (ref 12–16)
HGB BLD-MCNC: 14.8 G/DL (ref 12–16)
HYALINE CASTS #/AREA URNS LPF: NORMAL /LPF
KETONES UR STRIP.AUTO-MCNC: NEGATIVE MG/DL
LDLC SERPL CALC-MCNC: 63 MG/DL
LEUKOCYTE ESTERASE UR QL STRIP.AUTO: ABNORMAL
LYMPHOCYTES # BLD AUTO: 1.14 K/UL (ref 1–4.8)
LYMPHOCYTES # BLD AUTO: 1.2 K/UL (ref 1–4.8)
LYMPHOCYTES NFR BLD: 20.4 % (ref 22–41)
LYMPHOCYTES NFR BLD: 21.1 % (ref 22–41)
MANUAL DIFF BLD: NORMAL
MANUAL DIFF BLD: NORMAL
MCH RBC QN AUTO: 28.5 PG (ref 27–33)
MCH RBC QN AUTO: 28.6 PG (ref 27–33)
MCHC RBC AUTO-ENTMCNC: 31.4 G/DL (ref 33.6–35)
MCHC RBC AUTO-ENTMCNC: 31.5 G/DL (ref 33.6–35)
MCV RBC AUTO: 90.8 FL (ref 81.4–97.8)
MCV RBC AUTO: 90.9 FL (ref 81.4–97.8)
MICRO URNS: ABNORMAL
MONOCYTES # BLD AUTO: 0.54 K/UL (ref 0–0.85)
MONOCYTES # BLD AUTO: 0.55 K/UL (ref 0–0.85)
MONOCYTES NFR BLD AUTO: 9.6 % (ref 0–13.4)
MONOCYTES NFR BLD AUTO: 9.7 % (ref 0–13.4)
MORPHOLOGY BLD-IMP: NORMAL
MORPHOLOGY BLD-IMP: NORMAL
NEUTROPHILS # BLD AUTO: 3.62 K/UL (ref 2–7.15)
NEUTROPHILS # BLD AUTO: 3.75 K/UL (ref 2–7.15)
NEUTROPHILS NFR BLD: 64.6 % (ref 44–72)
NEUTROPHILS NFR BLD: 65.8 % (ref 44–72)
NITRITE UR QL STRIP.AUTO: NEGATIVE
NRBC # BLD AUTO: 0 K/UL
NRBC # BLD AUTO: 0 K/UL
NRBC BLD-RTO: 0 /100 WBC
NRBC BLD-RTO: 0 /100 WBC
OVALOCYTES BLD QL SMEAR: NORMAL
OVALOCYTES BLD QL SMEAR: NORMAL
PH UR STRIP.AUTO: 7 [PH]
PLATELET # BLD AUTO: 280 K/UL (ref 164–446)
PLATELET # BLD AUTO: 280 K/UL (ref 164–446)
PLATELET BLD QL SMEAR: NORMAL
PLATELET BLD QL SMEAR: NORMAL
PMV BLD AUTO: 9.7 FL (ref 9–12.9)
PMV BLD AUTO: 9.8 FL (ref 9–12.9)
POIKILOCYTOSIS BLD QL SMEAR: NORMAL
POIKILOCYTOSIS BLD QL SMEAR: NORMAL
POTASSIUM SERPL-SCNC: 4.1 MMOL/L (ref 3.6–5.5)
PROT SERPL-MCNC: 6.7 G/DL (ref 6–8.2)
PROT UR QL STRIP: NEGATIVE MG/DL
RBC # BLD AUTO: 5.14 M/UL (ref 4.2–5.4)
RBC # BLD AUTO: 5.2 M/UL (ref 4.2–5.4)
RBC # URNS HPF: NORMAL /HPF
RBC BLD AUTO: PRESENT
RBC BLD AUTO: PRESENT
RBC UR QL AUTO: NEGATIVE
SODIUM SERPL-SCNC: 142 MMOL/L (ref 135–145)
SP GR UR STRIP.AUTO: 1.02
TRIGL SERPL-MCNC: 129 MG/DL (ref 0–149)
TSH SERPL DL<=0.005 MIU/L-ACNC: 0.44 UIU/ML (ref 0.38–5.33)
UROBILINOGEN UR STRIP.AUTO-MCNC: 1 MG/DL
WBC # BLD AUTO: 5.6 K/UL (ref 4.8–10.8)
WBC # BLD AUTO: 5.7 K/UL (ref 4.8–10.8)
WBC #/AREA URNS HPF: NORMAL /HPF

## 2018-06-20 PROCEDURE — 85027 COMPLETE CBC AUTOMATED: CPT | Mod: 91

## 2018-06-20 PROCEDURE — 80061 LIPID PANEL: CPT

## 2018-06-20 PROCEDURE — 85007 BL SMEAR W/DIFF WBC COUNT: CPT | Mod: 91

## 2018-06-20 PROCEDURE — 85007 BL SMEAR W/DIFF WBC COUNT: CPT

## 2018-06-20 PROCEDURE — 85027 COMPLETE CBC AUTOMATED: CPT

## 2018-06-20 PROCEDURE — 36415 COLL VENOUS BLD VENIPUNCTURE: CPT

## 2018-06-20 PROCEDURE — 80053 COMPREHEN METABOLIC PANEL: CPT

## 2018-06-20 PROCEDURE — 81001 URINALYSIS AUTO W/SCOPE: CPT

## 2018-06-20 PROCEDURE — 84443 ASSAY THYROID STIM HORMONE: CPT

## 2018-06-28 ENCOUNTER — TELEPHONE (OUTPATIENT)
Dept: MEDICAL GROUP | Facility: LAB | Age: 68
End: 2018-06-28

## 2018-06-28 NOTE — TELEPHONE ENCOUNTER
----- Message from CHIDI Reyna sent at 6/26/2018  6:49 AM PDT -----  Please let bryon know that her labs look good - no sign of cholesterol, thyroid, kidney or liver problems.

## 2018-07-30 ENCOUNTER — TELEPHONE (OUTPATIENT)
Dept: MEDICAL GROUP | Facility: LAB | Age: 68
End: 2018-07-30

## 2018-07-30 NOTE — TELEPHONE ENCOUNTER
ESTABLISHED PATIENT PRE-VISIT PLANNING     Note: Patient will not be contacted if there is no indication to call.     1.  Reviewed notes from the last few office visits within the medical group: Yes    2.  If any orders were placed at last visit or intended to be done for this visit (i.e. 6 mos follow-up), do we have Results/Consult Notes?        •  Labs - Labs were not ordered at last office visit.       •  Imaging - Imaging was not ordered at last office visit.       •  Referrals - No referrals were ordered at last office visit.    3. Is this appointment scheduled as a Hospital Follow-Up? No    4.  Immunizations were updated in Epic using WebIZ?: Epic matches WebIZ       •  Web Iz Recommendations: ZOSTAVAX (Shingles)    5.  Patient is due for the following Health Maintenance Topics:   Health Maintenance Due   Topic Date Due   • IMM ZOSTER VACCINES (2 of 3) 12/25/2017       - Patient has completed PNEUMOVAX (PPSV23), PREVNAR (PCV13) , TDAP and SHINGRIX (Shingles) Immunization(s) per WebIZ. Chart has been updated.    6.  MDX printed for Provider? NO    7.  Patient was NOT informed to arrive 15 min prior to their scheduled appointment and bring in their medication bottles.

## 2018-07-31 ENCOUNTER — OFFICE VISIT (OUTPATIENT)
Dept: MEDICAL GROUP | Facility: LAB | Age: 68
End: 2018-07-31
Payer: MEDICARE

## 2018-07-31 VITALS
HEART RATE: 100 BPM | HEIGHT: 66 IN | OXYGEN SATURATION: 94 % | RESPIRATION RATE: 12 BRPM | DIASTOLIC BLOOD PRESSURE: 82 MMHG | BODY MASS INDEX: 27.48 KG/M2 | TEMPERATURE: 97.6 F | WEIGHT: 171 LBS | SYSTOLIC BLOOD PRESSURE: 110 MMHG

## 2018-07-31 DIAGNOSIS — Z01.419 ENCOUNTER FOR GYNECOLOGICAL EXAMINATION: ICD-10-CM

## 2018-07-31 DIAGNOSIS — Z23 NEED FOR SHINGLES VACCINE: ICD-10-CM

## 2018-07-31 PROCEDURE — G0101 CA SCREEN;PELVIC/BREAST EXAM: HCPCS | Performed by: NURSE PRACTITIONER

## 2018-07-31 NOTE — PROGRESS NOTES
Chief Complaint   Patient presents with   • Gynecologic Exam     pelvic exam       HPI:  Lauren is a 68 y.o.  female who presents for annual exam with pelvic exam. Generally the patient is feeling good. She has no complaints or concerns.  She is up-to-date with her oncologist with her history of breast cancer 5 years ago.  She has had a total hysterectomy and is not having any pelvic pain.  Her last pelvic exam was about 3 years ago she thinks.  Regarding her health maintenance:   Had total hysterectomy around age 52 because of excessive uterine bleeding.  + hx of breast CA - followed by Dr. Jay, right mastectomy done by Dr. Garg.  Continues on estrogen receptor blockers.  Last colonoscopy: 2015  Bone density test:N\A   All labs are UTD.  Regular exercise: yes    meds:   Current Outpatient Prescriptions   Medication Sig Dispense Refill   • exemestane (AROMASIN) 25 MG tablet      • amlodipine-benazepril (LOTREL) 5-10 MG per capsule TAKE 1 CAP BY MOUTH EVERYDAY. 90 Cap 2   • omeprazole (PRILOSEC) 20 MG delayed-release capsule TAKE 1 CAPSULE BY MOUTH EVERY EVENING 90 Cap 2   • Cholecalciferol (VITAMIN D) 2000 UNITS Cap Take 1 Cap by mouth every evening.     • therapeutic multivitamin-minerals (THERAGRAN-M) Tab Take 1 Tab by mouth every day.     • aspirin EC (ECOTRIN) 81 MG Tablet Delayed Response Take 81 mg by mouth as needed (For chest pain).     • pravastatin (PRAVACHOL) 20 MG Tab Take 1 Tab by mouth every bedtime. 90 Tab 2   • calcium carbonate (OS-OMERO 500) 1250 MG Tab Take 1,250 mg by mouth 2 times a day, with meals.     • Coenzyme Q10 (CO Q 10 PO) Take 1 Cap by mouth every bedtime.       No current facility-administered medications for this visit.        Allergies: No Known Allergies    family:   Family History   Problem Relation Age of Onset   • Hypertension Father    • Heart Disease Father    • Cancer Sister          hadbreast / possible uterine also (Dr. Mendoza)   • Hypertension Mother    • Lung Disease Mother  "        smoker   • Arthritis Mother         RA   • Stroke Mother    • Diabetes Maternal Uncle         type I   • Cancer Maternal Grandmother         Colon   • Diabetes Maternal Grandfather         type II       social hx:   Social History     Social History   • Marital status:      Spouse name: N/A   • Number of children: N/A   • Years of education: N/A     Occupational History   • Not on file.     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   No polydipsia, polyuria, temperature intolerance, significant weight changes   No visual changes, blurred vision.  No chest pain, palpitations, peripheral swelling   No chronic cough, shortness of breath, dyspnea with exertion.   No dysphagia, odynophagia, black or bloody stools.   No abdominal pain, nausea, persistent diarrhea, constipation   No dysuria, hematuria, incontinence. Denies nocturia  No rash, pruritis, pigment changes.   No focal weakness, syncope, headache, confusion, persistent numbness.   All other systems are reviewed and negative.    PHYSICAL EXAMINATION:  Blood pressure 110/82, pulse 100, temperature 36.4 °C (97.6 °F), resp. rate 12, height 1.664 m (5' 5.5\"), weight 77.6 kg (171 lb), SpO2 94 %.    General appearance:healthy, well developed, well nourished  Psych: alert, no distress, cooperative  Eyes: EOM's normal, pupils equal, round, reactive to light  ENT: Ears: external ears normal to inspection and palpation, TM's clear, Nose/Sinuses: nose shows no deformity, asymmetry, or inflammation  Neck: no " asymmetry, masses, or scars, no adenopathy, thyroid normal to palpation  Lungs:chest symmetric with normal A/P diameter, no chest deformities noted, normal respiratory rate and rhythm  Cardiovascular:regular rate and rhythm, S1 normal  Breasts:: right breast tissue is surgically absent with implant in place. No masses or irregularities to left breast.  Abdomen: umbilicus normal, no masses palpable, no organomegaly  Musculoskeletal:ROM of all joints is normal, no evidence of joint instability  Lymphatic: None significantly enlarged  Skin: no rash, no edema  Neuro: mental status intact, cranial nerves 2-12 intact  Pelvic: external genitalia normal, cervix and uterus surgically absent.  adnexa without masses or tenderness, vaginal mucosa normal      ASSESSMENT/PLAN:  1.annual physical exam: HCM: Pelvic exam and breast exams done.  BSE technique reviewed and patient encouraged to perform self-exam monthly.   Encourage monthly self breast exam  Encourage daily exercise for at least 30 minutes  Mammogram is up-to-date from May.  Colonoscopy is due in 2020.  Recommend 1500 mg Calcium with 600 units vit d daily.    Labs are up-to-date.  She would like to reduce her pravastatin to a half a pill as her goal is to get off most of her prescription medications.  She will email me when she goes for her next lab work in 3-6 months and will recheck her lipid panel on 10 mg of pravastatin.

## 2018-08-09 RX ORDER — PRAVASTATIN SODIUM 20 MG
TABLET ORAL
Qty: 90 TAB | Refills: 1 | Status: SHIPPED | OUTPATIENT
Start: 2018-08-09 | End: 2019-03-12 | Stop reason: SDUPTHER

## 2018-10-30 ENCOUNTER — IMMUNIZATION (OUTPATIENT)
Dept: SOCIAL WORK | Facility: CLINIC | Age: 68
End: 2018-10-30
Payer: MEDICARE

## 2018-10-30 DIAGNOSIS — Z23 NEED FOR VACCINATION: ICD-10-CM

## 2018-10-30 PROCEDURE — G0008 ADMIN INFLUENZA VIRUS VAC: HCPCS | Performed by: REGISTERED NURSE

## 2018-10-30 PROCEDURE — 90662 IIV NO PRSV INCREASED AG IM: CPT | Performed by: REGISTERED NURSE

## 2018-11-30 ENCOUNTER — HOSPITAL ENCOUNTER (OUTPATIENT)
Dept: LAB | Facility: MEDICAL CENTER | Age: 68
End: 2018-11-30
Attending: SPECIALIST
Payer: MEDICARE

## 2018-11-30 LAB
ALBUMIN SERPL BCP-MCNC: 4.4 G/DL (ref 3.2–4.9)
ALBUMIN/GLOB SERPL: 1.8 G/DL
ALP SERPL-CCNC: 58 U/L (ref 30–99)
ALT SERPL-CCNC: 35 U/L (ref 2–50)
ANION GAP SERPL CALC-SCNC: 7 MMOL/L (ref 0–11.9)
AST SERPL-CCNC: 21 U/L (ref 12–45)
BASOPHILS # BLD AUTO: 0.9 % (ref 0–1.8)
BASOPHILS # BLD: 0.05 K/UL (ref 0–0.12)
BILIRUB SERPL-MCNC: 0.8 MG/DL (ref 0.1–1.5)
BUN SERPL-MCNC: 20 MG/DL (ref 8–22)
CALCIUM SERPL-MCNC: 9.9 MG/DL (ref 8.5–10.5)
CHLORIDE SERPL-SCNC: 107 MMOL/L (ref 96–112)
CO2 SERPL-SCNC: 26 MMOL/L (ref 20–33)
CREAT SERPL-MCNC: 1.15 MG/DL (ref 0.5–1.4)
EOSINOPHIL # BLD AUTO: 0.16 K/UL (ref 0–0.51)
EOSINOPHIL NFR BLD: 3 % (ref 0–6.9)
ERYTHROCYTE [DISTWIDTH] IN BLOOD BY AUTOMATED COUNT: 41.9 FL (ref 35.9–50)
GLOBULIN SER CALC-MCNC: 2.4 G/DL (ref 1.9–3.5)
GLUCOSE SERPL-MCNC: 99 MG/DL (ref 65–99)
HCT VFR BLD AUTO: 48 % (ref 37–47)
HGB BLD-MCNC: 15.6 G/DL (ref 12–16)
IMM GRANULOCYTES # BLD AUTO: 0.05 K/UL (ref 0–0.11)
IMM GRANULOCYTES NFR BLD AUTO: 0.9 % (ref 0–0.9)
LYMPHOCYTES # BLD AUTO: 1.27 K/UL (ref 1–4.8)
LYMPHOCYTES NFR BLD: 24.1 % (ref 22–41)
MCH RBC QN AUTO: 29.2 PG (ref 27–33)
MCHC RBC AUTO-ENTMCNC: 32.5 G/DL (ref 33.6–35)
MCV RBC AUTO: 89.9 FL (ref 81.4–97.8)
MONOCYTES # BLD AUTO: 0.52 K/UL (ref 0–0.85)
MONOCYTES NFR BLD AUTO: 9.8 % (ref 0–13.4)
NEUTROPHILS # BLD AUTO: 3.23 K/UL (ref 2–7.15)
NEUTROPHILS NFR BLD: 61.3 % (ref 44–72)
NRBC # BLD AUTO: 0 K/UL
NRBC BLD-RTO: 0 /100 WBC
PLATELET # BLD AUTO: 248 K/UL (ref 164–446)
PMV BLD AUTO: 9.5 FL (ref 9–12.9)
POTASSIUM SERPL-SCNC: 4.7 MMOL/L (ref 3.6–5.5)
PROT SERPL-MCNC: 6.8 G/DL (ref 6–8.2)
RBC # BLD AUTO: 5.34 M/UL (ref 4.2–5.4)
SODIUM SERPL-SCNC: 140 MMOL/L (ref 135–145)
WBC # BLD AUTO: 5.3 K/UL (ref 4.8–10.8)

## 2018-11-30 PROCEDURE — 80053 COMPREHEN METABOLIC PANEL: CPT

## 2018-11-30 PROCEDURE — 36415 COLL VENOUS BLD VENIPUNCTURE: CPT

## 2018-11-30 PROCEDURE — 85025 COMPLETE CBC W/AUTO DIFF WBC: CPT

## 2019-02-07 DIAGNOSIS — I10 ESSENTIAL HYPERTENSION: ICD-10-CM

## 2019-02-07 RX ORDER — AMLODIPINE BESYLATE AND BENAZEPRIL HYDROCHLORIDE 5; 10 MG/1; MG/1
CAPSULE ORAL
Qty: 90 CAP | Refills: 2 | Status: SHIPPED | OUTPATIENT
Start: 2019-02-07 | End: 2019-05-14 | Stop reason: SDUPTHER

## 2019-02-07 NOTE — TELEPHONE ENCOUNTER
Was the patient seen in the last year in this department? Yes 7/31/2018    Does patient have an active prescription for medications requested? No     Received Request Via: Pharmacy

## 2019-03-12 RX ORDER — PRAVASTATIN SODIUM 20 MG
TABLET ORAL
Qty: 90 TAB | Refills: 0 | Status: SHIPPED | OUTPATIENT
Start: 2019-03-12 | End: 2019-04-09 | Stop reason: SDUPTHER

## 2019-03-12 NOTE — TELEPHONE ENCOUNTER
Was the patient seen in the last year in this department? Yes  LOV 7/31/18  Does patient have an active prescription for medications requested? No     Received Request Via: Pharmacy

## 2019-03-29 ENCOUNTER — OFFICE VISIT (OUTPATIENT)
Dept: BEHAVIORAL HEALTH | Facility: CLINIC | Age: 69
End: 2019-03-29
Payer: MEDICARE

## 2019-03-29 DIAGNOSIS — F43.23 ADJUSTMENT DISORDER WITH MIXED ANXIETY AND DEPRESSED MOOD: ICD-10-CM

## 2019-03-29 DIAGNOSIS — F06.31 DEPRESSION DUE TO PHYSICAL ILLNESS: ICD-10-CM

## 2019-03-29 PROCEDURE — 90791 PSYCH DIAGNOSTIC EVALUATION: CPT | Performed by: PSYCHOLOGIST

## 2019-04-09 RX ORDER — PRAVASTATIN SODIUM 20 MG
20 TABLET ORAL
Qty: 100 TAB | Refills: 1 | Status: SHIPPED | OUTPATIENT
Start: 2019-04-09 | End: 2019-10-31 | Stop reason: SDUPTHER

## 2019-04-09 NOTE — TELEPHONE ENCOUNTER
Was the patient seen in the last year in this department? Yes 7/31/18    Does patient have an active prescription for medications requested? No     Received Request Via: Pharmacy 100 day

## 2019-04-16 PROBLEM — F43.23 ADJUSTMENT DISORDER WITH MIXED ANXIETY AND DEPRESSED MOOD: Status: ACTIVE | Noted: 2019-04-16

## 2019-04-16 PROBLEM — F06.31 DEPRESSION DUE TO PHYSICAL ILLNESS: Status: ACTIVE | Noted: 2019-04-16

## 2019-04-16 NOTE — BH THERAPY
[facility]  INITIAL ASSESSMENT    Name: Lauren Jiang  MRN: [id number]  : 1950  Age: 68 y.o.  Date of assessment: 3/29/2019  PCP: CHIDI Reyna  Persons in attendance: Patient  Total session time: 45 minutes      CHIEF COMPLAINT AND HISTORY OF PRESENTING PROBLEM:  (as stated by Patient):  Lauren Jiang is a 68 y.o., White female referred for assessment by No ref. provider found.  Primary presenting issue includes   Chief Complaint   Patient presents with   • Initial  Evaluation   • Depression   • Anxiety   • Stress   • Sleep Problem   .  Lauren presents for therapy with anxiety and depression related to her cancer treatment.  6 years ago which patient was treated for breast cancer and had a meniscectomy and recently her old biopsies were sent back to be evaluated where she learned she had 25% chance of the cancer recurring.  In addition to learning this, patient feels very depressed about her old oncologist leaving.  Patient did not have radiation or chemotherapy, she just received hormone treatment.  Unfortunately, this therapy creates joint pain and so patient change the type of therapy she received and now patient reports feeling very depressed, irritable and stopped caring about things and feels low motivation for life.  In response patient changed medications again.  Patient complained of feeling sleep deprived and declining memory about things unimportant.  Patient feels depressed and anxious about having to continue on with the hormone treatment when she was under the impression that she would be able to stop after 6 or 7 years.  She is not adjusting well to this.  Patient denies suicidal ideation, homicidal ideation, gabriele, psychosis or delusional thinking, OCD, or eating disorder.  Patient reports she might have a couple of drinks on the weekend and denies any THC or other drug use.  Patient reports some mild memory problems for recent information.  Patient denies a trauma  history.  Patient lives with her , Jared, and has been  for 50 years.  Patient reports that her  has an alcohol problem.  Patient has 2 daughters and 1 granddaughter.  Patient says there is no family history of mental illness or treatment.  She reports her family of origin is unremarkable.    FAMILY/SOCIAL HISTORY  Current living situation/household members: Patient lives with   Relevant family history/structure/dynamics: There seems to be some conflict about her 's drinking but otherwise they have been  50 years and she enjoys the relationship  Current family/social stressors: Patient's cancer treatment  Quality/quantity of current family and/or social support: Patient has friends and seems to have a lot of support.  Does patient/parent report a family history of behavioral health issues, diagnoses, or treatment? No  Family History   Problem Relation Age of Onset   • Hypertension Father    • Heart Disease Father    • Cancer Sister          hadbreast / possible uterine also (Dr. Mendoza)   • Hypertension Mother    • Lung Disease Mother         smoker   • Arthritis Mother         RA   • Stroke Mother    • Diabetes Maternal Uncle         type I   • Cancer Maternal Grandmother         Colon   • Diabetes Maternal Grandfather         type II        BEHAVIORAL HEALTH TREATMENT HISTORY  Does patient/parent report a history of prior behavioral health treatment for patient? No:    History of untreated behavioral health issues identified? No    MEDICAL HISTORY  Primary care behavioral health screenings: Patient Health Questionaire                                     If depressive symptoms identified deferred to follow up visit unless specifically addressed in assesment and plan.    Interpretation of PHQ-9 Total Score   Score Severity   1-4 No Depression   5-9 Mild Depression   10-14 Moderate Depression   15-19 Moderately Severe Depression   20-27 Severe Depression       Past  medical/surgical history:   Past Medical History:   Diagnosis Date   • Cancer (HCC)     Breast x 2 1/2 years    • Gallstones    • Glaucoma     right eye   • Hyperlipidemia    • Hypertension       Past Surgical History:   Procedure Laterality Date   • HYSTERECTOMY LAPAROSCOPY  53 y/o    cervix and bilat ovaries removed.   • KNEE ARTHROSCOPY  60 y/o    tripped over baby gate   • MASTECTOMY  age 62    right mastectomy   • TONSILLECTOMY AND ADENOIDECTOMY  5 y/o        Medication Allergies:  Patient has no known allergies.   Medical history provided by patient during current evaluation: Patient reports history of cancer treatment glaucoma hypertension and gallbladder disease    Patient reports last physical exam: 2019  Does patient/parent report any history of or current developmental concerns? No  Does patient/parent report nutritional concerns? No  Does patient/parent report change in appetite or weight loss/gain? No  Does patient/parent report history of eating disorder symptoms? No  Does patient/parent report dental problem? No  Does patient/parent report physical pain? No   Indicate if pain is acute or chronic, and location: na   Pain scale rating:       Does patient/parent report functional impact of medical, developmental, or pain issues?   yes    EDUCATIONAL/LEARNING HISTORY  Is patient currently enrolled in a school/educational program?   No:   Highest grade level completed: college  School performance/functioning: good  History of Special Education/repeated grades/learning issues: no  Preferred learning style: dk  Current learning needs (large print, language barrier, etc):  na    EMPLOYMENT/RESOURCES  Is the patient currently employed? retired  Does the patient/parent report adequate financial resources? Yes  Does patient identify impact of presenting issue on work functioning? No  Work or income-related stressors:  na     HISTORY:  Does patient report current or past enlistment? No    [If yes,  complete below items]      SPIRITUAL/CULTURAL/IDENTITY:  What are the patient’s/family’s spiritual beliefs or practices? Christianity  What is the patient’s cultural or ethnic background/identity?    How does the patient identify their sexual orientation?  Heterosexual  How does the patient identify their gender?  Female  Does the patient identify any spiritual/cultural/identity factors as relevant to the presenting issue? No    LEGAL HISTORY  Has the patient ever been involved with juvenile, adult, or family legal systems? No   [If yes, trigger section below:]  Does patient report ever being a victim of a crime?  No  Does patient report involvement in any current legal issues?  No  Does patient report ever being arrested or committing a crime? No  Does patient report any current agency (parole/probation/CPS/) involvement? No    ABUSE/NEGLECT/TRAUMA SCREENING  Does patient report feeling “unsafe” in his/her home, or afraid of anyone? No  Does patient report any history of physical, sexual, or emotional abuse? No  Does parent or significant other report any of the above? No  Is there evidence of neglect by self? No  Is there evidence of neglect by a caregiver? No  Does the patient/parent report any history of CPS/APS/police involvement related to suspected abuse/neglect or domestic violence? No  Does the patient/parent report any other history of potentially traumatic life events? No  Based on the information provided during the current assessment, is a mandated report of suspected abuse/neglect being made?  No     SAFETY ASSESSMENT - SELF  Does patient acknowledge current or past symptoms of dangerousness to self? No  Does parent/significant other report patient has current or past symptoms of dangerousness to self? No      Recent change in frequency/specificity/intensity of suicidal thoughts or self-harm behavior? No  Current access to firearms, medications, or other identified means of  suicide/self-harm? Yes  If yes, willing to restrict access to means of suicide/self-harm? na  Protective factors present: na    Current Suicide Risk: Not applicable  Crisis Safety Plan completed and copy given to patient: No    SAFETY ASSESSMENT - OTHERS  Does paor past symptoms of aggressive behavior or risk to others? No  Does parent/significant othtient acknowledge current or past symptoms of aggressive behavior or risk to others? No  Does parent/significant other report patient has current or past symptoms of aggressive behavior or risk to others? No    Recent change in frequency/specificity/intensity of thoughts or threats to harm others? No  Current access to firearms/other identified means of harm? Yes  If yes, willing to restrict access to weapons/means of harm? na  Protective factors present: na    Current Homicide Risk:  Not applicable  Crisis Safety Plan completed and copy given to patient? No  Based on information provided during the current assessment, is a mandated “duty to warn” being exercised? No    SUBSTANCE USE/ADDICTION HISTORY  [] Not applicable - patient 10 years of age or younger    Is there a family history of substance use/addiction? No  Does patient acknowledge or parent/significant other report use of/dependence on substances? No  Last time patient used alcohol: na  Within the past week? No  Last time patient used marijuana: na  Within the past month? No  Any other street drugs ever tried even once? No  Any use of prescription medications/pills without a prescription, or for reasons others than originally prescribed?  No  Any other addictive behavior reported (gambling, shopping, sex)? No     Drug History:  Amphetamine:      Cannibis:      Cocaine:      Ecstasy:      Hallucinogen:      Inhalant:       Opiate:      Other:      Sedative:           What consequences does the patient associate with any of the above substance use and or addictive behaviors? None    Patient’s  motivation/readiness for change: na    [] Patient denies use of any substance/addictive behaviors    STRENGTHS/ASSETS  Strengths Identified by interviewer: Insight into problems, Evidence of good judgement, Self-awareness, Family suppport, Social support, Stable relationships, Optimism, Problem-solving skills, Sense of humor, Cognitive flexibility and Social skills  Strengths Identified by patient: same    MENTAL STATUS/OBSERVATIONS   Participation: Active verbal participation, Attentive and Engaged  Grooming: Casual  Orientation:Alert and Fully Oriented   Behavior: Calm  Eye contact: Good   Mood:Depressed  Affect:Anxious  Thought process: Logical and Goal-directed  Thought content:  Within normal limits  Speech: Rate within normal limits  Perception: Within normal limits  Memory: No gross evidence of memory deficits  Insight: Good  Judgment:  Good  Other:    Family/couple interaction observations: na    RESULTS OF SCREENING MEASURES:  [] Not applicable  Measure:   Score:     Measure:   Score:       CLINICAL FORMULATION: Lauren presents for therapy with some depression and anxiety and difficulty adjusting to the news that she will have to continue her hormone treatment for her breast cancer that she was treated for 6 years ago.  She is also sad about her oncologist leaving and having to start over with a new person.  She is having significant sleep problems feels depressed, irritable, and stopped caring about things.  Patient denies suicidal ideation      DIAGNOSTIC IMPRESSION(S):  1. Depression due to physical illness    2. Adjustment disorder with mixed anxiety and depressed mood          IDENTIFIED NEEDS/PLAN:  [If any of these marked, trigger DISPOSITION list]  Mood/anxiety  Refer to [facility]: Outpatient Therapy    Does patient express agreement with the above plan? Yes     Referral appointment(s) scheduled? Yes       Cherry Bowden, Ph.D.

## 2019-04-30 ENCOUNTER — APPOINTMENT (OUTPATIENT)
Dept: BEHAVIORAL HEALTH | Facility: CLINIC | Age: 69
End: 2019-04-30
Payer: MEDICARE

## 2019-05-14 DIAGNOSIS — I10 ESSENTIAL HYPERTENSION: ICD-10-CM

## 2019-05-14 RX ORDER — AMLODIPINE BESYLATE AND BENAZEPRIL HYDROCHLORIDE 5; 10 MG/1; MG/1
1 CAPSULE ORAL DAILY
Qty: 100 CAP | Refills: 2 | Status: SHIPPED | OUTPATIENT
Start: 2019-05-14 | End: 2019-10-31 | Stop reason: SDUPTHER

## 2019-05-14 NOTE — TELEPHONE ENCOUNTER
Was the patient seen in the last year in this department? Yes lov 3/29/19    Does patient have an active prescription for medications requested? No     Received Request Via: Pharmacy     100 day supply

## 2019-06-06 ENCOUNTER — HOSPITAL ENCOUNTER (OUTPATIENT)
Dept: RADIOLOGY | Facility: MEDICAL CENTER | Age: 69
End: 2019-06-06
Attending: INTERNAL MEDICINE
Payer: MEDICARE

## 2019-06-06 DIAGNOSIS — Z12.31 VISIT FOR SCREENING MAMMOGRAM: ICD-10-CM

## 2019-06-06 PROCEDURE — 77063 BREAST TOMOSYNTHESIS BI: CPT

## 2019-08-08 ENCOUNTER — HOSPITAL ENCOUNTER (OUTPATIENT)
Dept: LAB | Facility: MEDICAL CENTER | Age: 69
End: 2019-08-08
Attending: INTERNAL MEDICINE
Payer: MEDICARE

## 2019-08-08 LAB
ALBUMIN SERPL BCP-MCNC: 4.4 G/DL (ref 3.2–4.9)
ALBUMIN/GLOB SERPL: 1.5 G/DL
ALP SERPL-CCNC: 51 U/L (ref 30–99)
ALT SERPL-CCNC: 32 U/L (ref 2–50)
ANION GAP SERPL CALC-SCNC: 7 MMOL/L (ref 0–11.9)
AST SERPL-CCNC: 22 U/L (ref 12–45)
BASOPHILS # BLD AUTO: 1 % (ref 0–1.8)
BASOPHILS # BLD: 0.06 K/UL (ref 0–0.12)
BILIRUB SERPL-MCNC: 0.7 MG/DL (ref 0.1–1.5)
BUN SERPL-MCNC: 20 MG/DL (ref 8–22)
CALCIUM SERPL-MCNC: 9.9 MG/DL (ref 8.5–10.5)
CHLORIDE SERPL-SCNC: 108 MMOL/L (ref 96–112)
CO2 SERPL-SCNC: 27 MMOL/L (ref 20–33)
CREAT SERPL-MCNC: 1.06 MG/DL (ref 0.5–1.4)
EOSINOPHIL # BLD AUTO: 0.17 K/UL (ref 0–0.51)
EOSINOPHIL NFR BLD: 2.8 % (ref 0–6.9)
ERYTHROCYTE [DISTWIDTH] IN BLOOD BY AUTOMATED COUNT: 41.6 FL (ref 35.9–50)
GLOBULIN SER CALC-MCNC: 2.9 G/DL (ref 1.9–3.5)
GLUCOSE SERPL-MCNC: 93 MG/DL (ref 65–99)
HCT VFR BLD AUTO: 47.7 % (ref 37–47)
HGB BLD-MCNC: 14.9 G/DL (ref 12–16)
IMM GRANULOCYTES # BLD AUTO: 0.05 K/UL (ref 0–0.11)
IMM GRANULOCYTES NFR BLD AUTO: 0.8 % (ref 0–0.9)
LYMPHOCYTES # BLD AUTO: 1.24 K/UL (ref 1–4.8)
LYMPHOCYTES NFR BLD: 20.6 % (ref 22–41)
MCH RBC QN AUTO: 28.5 PG (ref 27–33)
MCHC RBC AUTO-ENTMCNC: 31.2 G/DL (ref 33.6–35)
MCV RBC AUTO: 91.2 FL (ref 81.4–97.8)
MONOCYTES # BLD AUTO: 0.54 K/UL (ref 0–0.85)
MONOCYTES NFR BLD AUTO: 9 % (ref 0–13.4)
NEUTROPHILS # BLD AUTO: 3.95 K/UL (ref 2–7.15)
NEUTROPHILS NFR BLD: 65.8 % (ref 44–72)
NRBC # BLD AUTO: 0 K/UL
NRBC BLD-RTO: 0 /100 WBC
PLATELET # BLD AUTO: 257 K/UL (ref 164–446)
PMV BLD AUTO: 9.6 FL (ref 9–12.9)
POTASSIUM SERPL-SCNC: 3.9 MMOL/L (ref 3.6–5.5)
PROT SERPL-MCNC: 7.3 G/DL (ref 6–8.2)
RBC # BLD AUTO: 5.23 M/UL (ref 4.2–5.4)
SODIUM SERPL-SCNC: 142 MMOL/L (ref 135–145)
WBC # BLD AUTO: 6 K/UL (ref 4.8–10.8)

## 2019-08-08 PROCEDURE — 85025 COMPLETE CBC W/AUTO DIFF WBC: CPT

## 2019-08-08 PROCEDURE — 80053 COMPREHEN METABOLIC PANEL: CPT

## 2019-08-08 PROCEDURE — 36415 COLL VENOUS BLD VENIPUNCTURE: CPT

## 2019-08-14 ENCOUNTER — OFFICE VISIT (OUTPATIENT)
Dept: MEDICAL GROUP | Facility: PHYSICIAN GROUP | Age: 69
End: 2019-08-14
Payer: MEDICARE

## 2019-08-14 VITALS
TEMPERATURE: 97.7 F | DIASTOLIC BLOOD PRESSURE: 72 MMHG | RESPIRATION RATE: 19 BRPM | WEIGHT: 169 LBS | OXYGEN SATURATION: 98 % | HEIGHT: 65 IN | BODY MASS INDEX: 28.16 KG/M2 | SYSTOLIC BLOOD PRESSURE: 110 MMHG | HEART RATE: 98 BPM

## 2019-08-14 DIAGNOSIS — E78.2 MIXED HYPERLIPIDEMIA: ICD-10-CM

## 2019-08-14 DIAGNOSIS — I10 ESSENTIAL HYPERTENSION: ICD-10-CM

## 2019-08-14 DIAGNOSIS — N18.2 CKD (CHRONIC KIDNEY DISEASE), STAGE II: ICD-10-CM

## 2019-08-14 DIAGNOSIS — Z12.11 COLON CANCER SCREENING: ICD-10-CM

## 2019-08-14 DIAGNOSIS — Z85.3 HISTORY OF BREAST CANCER: ICD-10-CM

## 2019-08-14 DIAGNOSIS — H92.03 OTALGIA OF BOTH EARS: ICD-10-CM

## 2019-08-14 PROBLEM — K59.09 OTHER CONSTIPATION: Status: RESOLVED | Noted: 2018-04-11 | Resolved: 2019-08-14

## 2019-08-14 PROBLEM — N94.9 VAGINAL BURNING: Status: RESOLVED | Noted: 2018-04-11 | Resolved: 2019-08-14

## 2019-08-14 PROBLEM — N94.89 VAGINAL BURNING: Status: RESOLVED | Noted: 2018-04-11 | Resolved: 2019-08-14

## 2019-08-14 PROCEDURE — 99214 OFFICE O/P EST MOD 30 MIN: CPT | Performed by: NURSE PRACTITIONER

## 2019-08-14 RX ORDER — LETROZOLE 2.5 MG/1
2.5 TABLET, FILM COATED ORAL DAILY
COMMUNITY
Start: 2019-07-17 | End: 2021-11-09

## 2019-08-14 ASSESSMENT — ENCOUNTER SYMPTOMS
HEADACHES: 0
FEVER: 0
RHINORRHEA: 0
CHILLS: 0
SORE THROAT: 0

## 2019-08-14 ASSESSMENT — PATIENT HEALTH QUESTIONNAIRE - PHQ9: CLINICAL INTERPRETATION OF PHQ2 SCORE: 0

## 2019-08-14 NOTE — ASSESSMENT & PLAN NOTE
This is a chronic issue.  Last GFR drawn 8/8/2019 showed GFR of 52 and creatinine level of 1.06.  She does admit to drinking 1 pot of coffee daily.  She occasionally drinks alcohol.  She does not take chronic NSAIDs.  She consumes average of 84 ounces of water daily.

## 2019-08-14 NOTE — ASSESSMENT & PLAN NOTE
This is a chronic issue.  Her blood pressure is currently managed with amlodipine-benazepril 5-10 mg daily.  She denies chest pain or shortness of breath.

## 2019-08-14 NOTE — ASSESSMENT & PLAN NOTE
This is a chronic issue.  Her cholesterol is managed with pravastatin 20 mg daily.  Her last lipid panel was drawn over one year ago. LFT levels within normal limits 8/8/2019.    Results for SHANAE MACIEL (MRN [id number]) as of 8/14/2019 10:03   Ref. Range 6/20/2018 08:36   Cholesterol,Tot Latest Ref Range: 100 - 199 mg/dL 142   Triglycerides Latest Ref Range: 0 - 149 mg/dL 129   HDL Latest Ref Range: >=40 mg/dL 53   LDL Latest Ref Range: <100 mg/dL 63

## 2019-08-14 NOTE — ASSESSMENT & PLAN NOTE
She was diagnosed 2013 with right breast cancer.  She is taking mikala 2.5 mg daily.  She is routinely followed by oncology with [facility] Dr. Viridiana Carl.

## 2019-08-14 NOTE — PROGRESS NOTES
Lauren Jiang is a 69 y.o. female here to establish care. Her previous PCP was Cierra MILLER. She presents with the following concerns:    HPI:      Essential hypertension  This is a chronic issue.  Her blood pressure is currently managed with amlodipine-benazepril 5-10 mg daily.  She denies chest pain or shortness of breath.     History of breast cancer  She was diagnosed 2013 with right breast cancer.  She is taking mikala 2.5 mg daily.  She is routinely followed by oncology with [facility] Dr. Viridiana Carl.      Mixed hyperlipidemia  This is a chronic issue.  Her cholesterol is managed with pravastatin 20 mg daily.  Her last lipid panel was drawn over one year ago. LFT levels within normal limits 8/8/2019.    Results for LAUREN JIANG (MRN [id number]) as of 8/14/2019 10:03   Ref. Range 6/20/2018 08:36   Cholesterol,Tot Latest Ref Range: 100 - 199 mg/dL 142   Triglycerides Latest Ref Range: 0 - 149 mg/dL 129   HDL Latest Ref Range: >=40 mg/dL 53   LDL Latest Ref Range: <100 mg/dL 63       CKD (chronic kidney disease), stage II  This is a chronic issue.  Last GFR drawn 8/8/2019 showed GFR of 52 and creatinine level of 1.06.  She does admit to drinking 1 pot of coffee daily.  She occasionally drinks alcohol.  She does not take chronic NSAIDs.  She consumes average of 84 ounces of water daily.    Otalgia    There is pain in both ears. This is a new problem. The current episode started 1 to 4 weeks ago. Episode frequency: Intermittently. The problem has been waxing and waning. There has been no fever. The pain is moderate. Pertinent negatives include no ear discharge, headaches, hearing loss, rhinorrhea or sore throat. She has tried nothing for the symptoms.       Current medicines (including changes today)  Current Outpatient Medications   Medication Sig Dispense Refill   • letrozole (FEMARA) 2.5 MG Tab Take 2.5 mg by mouth every day. Indications: Cancer of the Breast     • amlodipine-benazepril (LOTREL) 5-10  MG per capsule Take 1 Cap by mouth every day. 100 Cap 2   • pravastatin (PRAVACHOL) 20 MG Tab Take 1 Tab by mouth every bedtime. 100 Tab 1   • calcium carbonate (OS-OMERO 500) 1250 MG Tab Take 1,250 mg by mouth 2 times a day, with meals.       No current facility-administered medications for this visit.      She  has a past medical history of Cancer (HCC), Cataract, Gallstones, Glaucoma, Hyperlipidemia, Hyperlipidemia LDL goal <100 (2016), and Hypertension.  She  has a past surgical history that includes hysterectomy laparoscopy (53 y/o); mastectomy (age 62); tonsillectomy and adenoidectomy (7 y/o); knee arthroscopy (58 y/o); and knee arthroscopy (Right).  Social History     Tobacco Use   • Smoking status: Former Smoker     Packs/day: 0.25     Years: 10.00     Pack years: 2.50     Types: Cigarettes     Last attempt to quit: 2000     Years since quittin.6   • Smokeless tobacco: Never Used   Substance Use Topics   • Alcohol use: Yes     Alcohol/week: 4.2 oz     Types: 7 Glasses of wine per week   • Drug use: No     Social History     Social History Narrative   • Not on file     Family History   Problem Relation Age of Onset   • Heart Disease Father    • Cancer Sister         breast with mets   • Hypertension Mother    • Lung Disease Mother         smoker   • Arthritis Mother         RA   • Stroke Mother    • Diabetes Maternal Uncle         type I   • Cancer Maternal Grandmother         Colon   • Diabetes Maternal Grandfather         type II   • Arthritis Daughter    • No Known Problems Daughter    • No Known Problems Paternal Grandmother    • No Known Problems Paternal Grandfather      Family Status   Relation Name Status   • Fa     • Sis  Alive   • Mo     • MUnc     • MGMo     • MGFa     • Katy  Alive   • Katy  Alive   • PGMo     • PGFa       Review of Systems   Constitutional: Negative for chills and fever.   HENT: Positive for ear pain. Negative for  "ear discharge, hearing loss, rhinorrhea and sore throat.    Neurological: Negative for headaches.       All other systems reviewed and are negative    Depression Screening    Little interest or pleasure in doing things?  0 - not at all  Feeling down, depressed , or hopeless? 0 - not at all  Patient Health Questionnaire Score: 0    If depressive symptoms identified deferred to follow up visit unless specifically addressed in assesment and plan.      Interpretation of PHQ-9 Total Score   Score Severity   1-4 Minimal Depression   5-9 Mild Depression   10-14 Moderate Depression   15-19 Moderately Severe Depression   20-27 Severe Depression       Objective:     /72   Pulse 98   Temp 36.5 °C (97.7 °F) (Temporal)   Resp 19   Ht 1.651 m (5' 5\")   Wt 76.7 kg (169 lb)   SpO2 98%  Body mass index is 28.12 kg/m².    Physical Exam:  Constitutional: Oriented to person, place, and time and well-developed, well-nourished, and in no distress.   HENT:   Head: Normocephalic and atraumatic.   Right Ear: Tympanic membrane and external ear normal.   Left Ear: Tympanic membrane and external ear normal.   Mouth/Throat: Oropharynx is clear and moist and mucous membranes are normal. No oropharyngeal exudate or posterior oropharyngeal erythema.   Eyes: Conjunctivae and EOM are normal. Pupils are equal, round, and reactive to light.   Neck: Normal range of motion. Neck supple. No thyromegaly present.   Cardiovascular: Normal rate, regular rhythm, normal heart sounds. Radial pulses intact. Exam reveals no friction rub. No murmur heard.  Pulmonary/Chest: Effort normal and breath sounds normal. No respiratory distress or use of accessory muscles. No wheezes, rhonchi, or rales.   Abdominal: Soft. Bowel sounds are normal. Exhibits no distension and no mass. There is no tenderness. No hepatosplenomegaly.    Musculoskeletal: Full range of motion. No deformity or swelling of joints. DTRs intact.   Lymphadenopathy:     No cervical " adenopathy.   Neurological: Alert and oriented to person, place, and time. Gait normal.   Skin: Skin is warm and dry. No cyanosis. No edema.  Psychiatric: Mood, memory, affect and judgment normal.     Assessment and Plan:   The following treatment plan was discussed    1. Otalgia of both ears  No abnormalities identified upon examination. She was referred to ENT for further evaluation.  - REFERRAL TO ENT    2. Mixed hyperlipidemia  Labs ordered to reevaluate cholesterol levels. Continue statin therapy as prescribed.  - Lipid Profile; Future    3. Essential hypertension  Stable, controlled. Continue medication as prescribed.    4. CKD (chronic kidney disease), stage II  Recommend limit consumption of ETOH and caffeine intake, avoid chronic use of NSAIDS, and maintain adequate daily water intake. Will continue to monitor.     5. History of breast cancer  She is followed by oncology.    6. Colon cancer screening  - REFERRAL TO GI FOR COLONOSCOPY    Records requested.    Followup: Return in about 6 months (around 2/14/2020), or if symptoms worsen or fail to improve.

## 2019-08-15 ENCOUNTER — HOSPITAL ENCOUNTER (OUTPATIENT)
Dept: LAB | Facility: MEDICAL CENTER | Age: 69
End: 2019-08-15
Attending: NURSE PRACTITIONER
Payer: MEDICARE

## 2019-08-15 DIAGNOSIS — E78.2 MIXED HYPERLIPIDEMIA: ICD-10-CM

## 2019-08-15 LAB
CHOLEST SERPL-MCNC: 176 MG/DL (ref 100–199)
FASTING STATUS PATIENT QL REPORTED: NORMAL
HDLC SERPL-MCNC: 61 MG/DL
LDLC SERPL CALC-MCNC: 86 MG/DL
TRIGL SERPL-MCNC: 145 MG/DL (ref 0–149)

## 2019-08-15 PROCEDURE — 80061 LIPID PANEL: CPT

## 2019-08-15 PROCEDURE — 36415 COLL VENOUS BLD VENIPUNCTURE: CPT

## 2019-08-19 ENCOUNTER — TELEPHONE (OUTPATIENT)
Dept: MEDICAL GROUP | Facility: PHYSICIAN GROUP | Age: 69
End: 2019-08-19

## 2019-08-19 NOTE — TELEPHONE ENCOUNTER
----- Message from SHERMAN Marques sent at 8/19/2019  1:07 PM PDT -----  Lipid panel shows normal cholesterol levels.    SHERMAN Marques,MIRTA-C

## 2019-09-16 ENCOUNTER — HOSPITAL ENCOUNTER (OUTPATIENT)
Dept: RADIOLOGY | Facility: MEDICAL CENTER | Age: 69
End: 2019-09-16
Attending: INTERNAL MEDICINE
Payer: MEDICARE

## 2019-09-16 DIAGNOSIS — C50.911 MALIGNANT NEOPLASM OF RIGHT FEMALE BREAST, UNSPECIFIED ESTROGEN RECEPTOR STATUS, UNSPECIFIED SITE OF BREAST (HCC): ICD-10-CM

## 2019-09-16 PROCEDURE — 77080 DXA BONE DENSITY AXIAL: CPT

## 2019-10-31 ENCOUNTER — PATIENT MESSAGE (OUTPATIENT)
Dept: MEDICAL GROUP | Facility: PHYSICIAN GROUP | Age: 69
End: 2019-10-31

## 2019-10-31 DIAGNOSIS — I10 ESSENTIAL HYPERTENSION: ICD-10-CM

## 2019-10-31 RX ORDER — AMLODIPINE BESYLATE AND BENAZEPRIL HYDROCHLORIDE 5; 10 MG/1; MG/1
1 CAPSULE ORAL DAILY
Qty: 100 CAP | Refills: 2 | Status: SHIPPED | OUTPATIENT
Start: 2019-10-31 | End: 2020-07-22

## 2019-10-31 RX ORDER — PRAVASTATIN SODIUM 20 MG
20 TABLET ORAL
Qty: 100 TAB | Refills: 1 | Status: SHIPPED | OUTPATIENT
Start: 2019-10-31 | End: 2020-08-17

## 2019-10-31 NOTE — TELEPHONE ENCOUNTER
From: Lauren Jiang  To: SHERMAN Marques  Sent: 10/31/2019 11:23 AM PDT  Subject: Prescription Question    i need to refill my Amlod/Benazp 5-10 mg and Pravastatin 20mg at [facility] on [address] in [place].

## 2019-10-31 NOTE — TELEPHONE ENCOUNTER
----- Message from Lauren Jiang sent at 10/31/2019 11:23 AM PDT -----  Regarding: Prescription Question  Contact: [phone]  i need to refill my Amlod/Benazp 5-10 mg and Pravastatin 20mg at [facility] on [address] in [place].

## 2019-11-04 ENCOUNTER — PATIENT OUTREACH (OUTPATIENT)
Dept: HEALTH INFORMATION MANAGEMENT | Facility: OTHER | Age: 69
End: 2019-11-04

## 2019-11-04 NOTE — PROGRESS NOTES
Outcome: Left Message to Introduce [facility] Program                                                Update [facility] with PCP                                               AHA  AWV needed    Please transfer to [facility] call back Amanda [phone]     [facility] Verified: yes    Attempt # 1

## 2019-11-05 NOTE — PROGRESS NOTES
Introduction completed to [facility] Program.  Updated [facility] with PCP and ordered new cards  Pt has no questions or concerns at this time.  If Pt calls, please [facility] to [facility]   Amanda [phone].  Pt prefers voice messages be left. does not answer her phone or goes online to her email  Declined AWV AHA at this time

## 2019-11-11 ENCOUNTER — PATIENT OUTREACH (OUTPATIENT)
Dept: HEALTH INFORMATION MANAGEMENT | Facility: OTHER | Age: 69
End: 2019-11-11

## 2019-11-12 ENCOUNTER — IMMUNIZATION (OUTPATIENT)
Dept: SOCIAL WORK | Facility: CLINIC | Age: 69
End: 2019-11-12
Payer: MEDICARE

## 2019-11-12 DIAGNOSIS — Z23 NEED FOR VACCINATION: ICD-10-CM

## 2019-11-12 PROCEDURE — 90662 IIV NO PRSV INCREASED AG IM: CPT | Performed by: REGISTERED NURSE

## 2019-11-12 PROCEDURE — G0008 ADMIN INFLUENZA VIRUS VAC: HCPCS | Performed by: REGISTERED NURSE

## 2020-01-03 ENCOUNTER — HOSPITAL ENCOUNTER (OUTPATIENT)
Dept: LAB | Facility: MEDICAL CENTER | Age: 70
End: 2020-01-03
Attending: INTERNAL MEDICINE
Payer: MEDICARE

## 2020-01-03 LAB
ALBUMIN SERPL BCP-MCNC: 4.3 G/DL (ref 3.2–4.9)
ALBUMIN/GLOB SERPL: 1.4 G/DL
ALP SERPL-CCNC: 46 U/L (ref 30–99)
ALT SERPL-CCNC: 25 U/L (ref 2–50)
ANION GAP SERPL CALC-SCNC: 10 MMOL/L (ref 0–11.9)
AST SERPL-CCNC: 21 U/L (ref 12–45)
BASOPHILS # BLD AUTO: 1.2 % (ref 0–1.8)
BASOPHILS # BLD: 0.06 K/UL (ref 0–0.12)
BILIRUB SERPL-MCNC: 0.8 MG/DL (ref 0.1–1.5)
BUN SERPL-MCNC: 17 MG/DL (ref 8–22)
CALCIUM SERPL-MCNC: 9.6 MG/DL (ref 8.5–10.5)
CHLORIDE SERPL-SCNC: 107 MMOL/L (ref 96–112)
CO2 SERPL-SCNC: 21 MMOL/L (ref 20–33)
CREAT SERPL-MCNC: 1.09 MG/DL (ref 0.5–1.4)
EOSINOPHIL # BLD AUTO: 0.12 K/UL (ref 0–0.51)
EOSINOPHIL NFR BLD: 2.4 % (ref 0–6.9)
ERYTHROCYTE [DISTWIDTH] IN BLOOD BY AUTOMATED COUNT: 40.1 FL (ref 35.9–50)
GLOBULIN SER CALC-MCNC: 3.1 G/DL (ref 1.9–3.5)
GLUCOSE SERPL-MCNC: 93 MG/DL (ref 65–99)
HCT VFR BLD AUTO: 46.5 % (ref 37–47)
HGB BLD-MCNC: 15.3 G/DL (ref 12–16)
IMM GRANULOCYTES # BLD AUTO: 0.02 K/UL (ref 0–0.11)
IMM GRANULOCYTES NFR BLD AUTO: 0.4 % (ref 0–0.9)
LYMPHOCYTES # BLD AUTO: 1.24 K/UL (ref 1–4.8)
LYMPHOCYTES NFR BLD: 24.7 % (ref 22–41)
MCH RBC QN AUTO: 29.3 PG (ref 27–33)
MCHC RBC AUTO-ENTMCNC: 32.9 G/DL (ref 33.6–35)
MCV RBC AUTO: 89.1 FL (ref 81.4–97.8)
MONOCYTES # BLD AUTO: 0.49 K/UL (ref 0–0.85)
MONOCYTES NFR BLD AUTO: 9.7 % (ref 0–13.4)
NEUTROPHILS # BLD AUTO: 3.1 K/UL (ref 2–7.15)
NEUTROPHILS NFR BLD: 61.6 % (ref 44–72)
NRBC # BLD AUTO: 0 K/UL
NRBC BLD-RTO: 0 /100 WBC
PLATELET # BLD AUTO: 261 K/UL (ref 164–446)
PMV BLD AUTO: 9.5 FL (ref 9–12.9)
POTASSIUM SERPL-SCNC: 4.3 MMOL/L (ref 3.6–5.5)
PROT SERPL-MCNC: 7.4 G/DL (ref 6–8.2)
RBC # BLD AUTO: 5.22 M/UL (ref 4.2–5.4)
SODIUM SERPL-SCNC: 138 MMOL/L (ref 135–145)
WBC # BLD AUTO: 5 K/UL (ref 4.8–10.8)

## 2020-01-03 PROCEDURE — 36415 COLL VENOUS BLD VENIPUNCTURE: CPT

## 2020-01-03 PROCEDURE — 80053 COMPREHEN METABOLIC PANEL: CPT

## 2020-01-03 PROCEDURE — 85025 COMPLETE CBC W/AUTO DIFF WBC: CPT

## 2020-01-04 ENCOUNTER — HOSPITAL ENCOUNTER (OUTPATIENT)
Dept: RADIOLOGY | Facility: MEDICAL CENTER | Age: 70
End: 2020-01-04
Attending: INTERNAL MEDICINE
Payer: MEDICARE

## 2020-01-04 DIAGNOSIS — G44.89 OTHER HEADACHE SYNDROME: ICD-10-CM

## 2020-01-04 DIAGNOSIS — H92.03 OTOGENIC OTALGIA OF BOTH EARS: ICD-10-CM

## 2020-01-04 PROCEDURE — 70553 MRI BRAIN STEM W/O & W/DYE: CPT

## 2020-01-04 PROCEDURE — A9576 INJ PROHANCE MULTIPACK: HCPCS | Performed by: INTERNAL MEDICINE

## 2020-01-04 PROCEDURE — 700117 HCHG RX CONTRAST REV CODE 255: Performed by: INTERNAL MEDICINE

## 2020-01-04 RX ADMIN — GADOTERIDOL 15 ML: 279.3 INJECTION, SOLUTION INTRAVENOUS at 12:45

## 2020-02-19 ENCOUNTER — OFFICE VISIT (OUTPATIENT)
Dept: MEDICAL GROUP | Facility: PHYSICIAN GROUP | Age: 70
End: 2020-02-19
Payer: MEDICARE

## 2020-02-19 VITALS
BODY MASS INDEX: 29.16 KG/M2 | SYSTOLIC BLOOD PRESSURE: 120 MMHG | DIASTOLIC BLOOD PRESSURE: 70 MMHG | HEIGHT: 65 IN | HEART RATE: 68 BPM | OXYGEN SATURATION: 96 % | WEIGHT: 175 LBS | RESPIRATION RATE: 16 BRPM | TEMPERATURE: 97.8 F

## 2020-02-19 DIAGNOSIS — Z11.59 ENCOUNTER FOR HEPATITIS C SCREENING TEST FOR LOW RISK PATIENT: ICD-10-CM

## 2020-02-19 DIAGNOSIS — Z00.00 MEDICARE ANNUAL WELLNESS VISIT, SUBSEQUENT: ICD-10-CM

## 2020-02-19 DIAGNOSIS — I10 ESSENTIAL HYPERTENSION: ICD-10-CM

## 2020-02-19 DIAGNOSIS — Z85.3 HISTORY OF BREAST CANCER: ICD-10-CM

## 2020-02-19 DIAGNOSIS — M85.89 OSTEOPENIA OF MULTIPLE SITES: ICD-10-CM

## 2020-02-19 DIAGNOSIS — Z13.29 SCREENING FOR THYROID DISORDER: ICD-10-CM

## 2020-02-19 DIAGNOSIS — E78.2 MIXED HYPERLIPIDEMIA: ICD-10-CM

## 2020-02-19 DIAGNOSIS — N18.2 CKD (CHRONIC KIDNEY DISEASE), STAGE II: ICD-10-CM

## 2020-02-19 DIAGNOSIS — Z12.11 COLON CANCER SCREENING: ICD-10-CM

## 2020-02-19 PROBLEM — F43.23 ADJUSTMENT DISORDER WITH MIXED ANXIETY AND DEPRESSED MOOD: Status: RESOLVED | Noted: 2019-04-16 | Resolved: 2020-02-19

## 2020-02-19 PROBLEM — F06.31 DEPRESSION DUE TO PHYSICAL ILLNESS: Status: RESOLVED | Noted: 2019-04-16 | Resolved: 2020-02-19

## 2020-02-19 PROCEDURE — G0439 PPPS, SUBSEQ VISIT: HCPCS | Performed by: NURSE PRACTITIONER

## 2020-02-19 PROCEDURE — 8041 PR SCP AHA: Performed by: NURSE PRACTITIONER

## 2020-02-19 RX ORDER — VITAMIN B COMPLEX
1000 TABLET ORAL DAILY
COMMUNITY

## 2020-02-19 SDOH — HEALTH STABILITY: MENTAL HEALTH: HOW OFTEN DO YOU HAVE A DRINK CONTAINING ALCOHOL?: 2-3 TIMES A WEEK

## 2020-02-19 SDOH — HEALTH STABILITY: MENTAL HEALTH: HOW MANY STANDARD DRINKS CONTAINING ALCOHOL DO YOU HAVE ON A TYPICAL DAY?: 1 OR 2

## 2020-02-19 ASSESSMENT — ENCOUNTER SYMPTOMS: GENERAL WELL-BEING: GOOD

## 2020-02-19 ASSESSMENT — ACTIVITIES OF DAILY LIVING (ADL): BATHING_REQUIRES_ASSISTANCE: 0

## 2020-02-19 ASSESSMENT — PATIENT HEALTH QUESTIONNAIRE - PHQ9: CLINICAL INTERPRETATION OF PHQ2 SCORE: 0

## 2020-02-19 NOTE — LETTER
[facility]  SHERMAN Marques  [address]  [facility] [place] [address]  Fax: [phone]   Authorization for Release/Disclosure of   Protected Health Information   Name: SHANAE JIANG : 1950 SSN: [id number]   Address: [address] [place] [address] Phone:    [phone] (home)    I authorize the entity listed below to release/disclose the PHI below to:   [facility]/SHERMAN Marques and SHERMAN Marques   Provider or Entity Name:  [facility]   Address   City, State, Zip   Phone:      Fax:     Reason for request: continuity of care   Information to be released:    [  ] LAST COLONOSCOPY,  including any PATH REPORT and follow-up  [  ] LAST FIT/COLOGUARD RESULT [  ] LAST DEXA  [  ] LAST MAMMOGRAM  [  ] LAST PAP  [  ] LAST LABS [  ] RETINA EXAM REPORT  [  ] IMMUNIZATION RECORDS  [ X ] Release all info      [  ] Check here and initial the line next to each item to release ALL health information INCLUDING  _____ Care and treatment for drug and / or alcohol abuse  _____ HIV testing, infection status, or AIDS  _____ Genetic Testing    DATES OF SERVICE OR TIME PERIOD TO BE DISCLOSED: _____________  I understand and acknowledge that:  * This Authorization may be revoked at any time by you in writing, except if your health information has already been used or disclosed.  * Your health information that will be used or disclosed as a result of you signing this authorization could be re-disclosed by the recipient. If this occurs, your re-disclosed health information may no longer be protected by State or Federal laws.  * You may refuse to sign this Authorization. Your refusal will not affect your ability to obtain treatment.  * This Authorization becomes effective upon signing and will  on (date) __________.      If no date is indicated, this Authorization will  one (1) year from the signature date.    Name: Shanae Jiang    Signature:   Date:        2/19/2020       PLEASE FAX REQUESTED RECORDS BACK TO: [phone]

## 2020-02-19 NOTE — PROGRESS NOTES
Annual Health Assessment Questions:    1.  Are you currently engaging in any exercise or physical activity? No    2.  How would you describe your mood or emotional well-being today? good    3.  Have you had any falls in the last year? Yes    4.  Have you noticed any problems with your balance or had difficulty walking? No    5.  In the last six months have you experienced any leakage of urine? No    6. DPA/Advanced Directive: Patient has Advanced Directive, but it is not on file. Instructed to bring in a copy to scan into their chart.     Chief Complaint   Patient presents with   • Annual Exam     Feeling cold all the time      HPI:  Lauren Jiang is a 69 y.o. here for Medicare Annual Wellness Visit     Patient Active Problem List    Diagnosis Date Noted   • CKD (chronic kidney disease), stage II 08/16/2017     Priority: Low   • History of breast cancer 01/12/2016     Priority: Low   • Mixed hyperlipidemia 01/12/2016     Priority: Low   • Overweight (BMI 25.0-29.9) 08/23/2017   • Essential hypertension 01/12/2016   • Glaucoma 01/12/2016   • Osteopenia 01/12/2016       Current Outpatient Medications   Medication Sig Dispense Refill   • vitamin D (CHOLECALCIFEROL) 1000 Unit (25 mcg) Tab Take 1,000 Units by mouth every day.     • amlodipine-benazepril (LOTREL) 5-10 MG per capsule Take 1 Cap by mouth every day. 100 Cap 2   • pravastatin (PRAVACHOL) 20 MG Tab Take 1 Tab by mouth every bedtime. 100 Tab 1   • letrozole (FEMARA) 2.5 MG Tab Take 2.5 mg by mouth every day. Indications: Cancer of the Breast     • calcium carbonate (OS-OMERO 500) 1250 MG Tab Take 1,250 mg by mouth 2 times a day, with meals.       No current facility-administered medications for this visit.             Current supplements as per medication list.       Allergies: Patient has no known allergies.    Current social contact/activities: Islam     She  reports that she quit smoking about 20 years ago. Her smoking use included cigarettes. She has a  2.50 pack-year smoking history. She has never used smokeless tobacco. She reports current alcohol use. She reports that she does not use drugs.  Counseling given: Not Answered      DPA/Advanced Directive:  Patient has Advanced Directive, but it is not on file. Instructed to bring in a copy to scan into their chart.    ROS:    Gait: Uses no assistive device  Ostomy: No  Other tubes: No  Amputations: No  Chronic oxygen use: No  Last eye exam: 10 months ago  Wears hearing aids: No   : Denies any urinary leakage during the last 6 months    Screening:    Depression Screening    Little interest or pleasure in doing things?  0 - not at all  Feeling down, depressed , or hopeless? 0 - not at all  Patient Health Questionnaire Score: 0     If depressive symptoms identified deferred to follow up visit unless specifically addressed in assessment and plan.    Interpretation of PHQ-9 Total Score   Score Severity   1-4 No Depression   5-9 Mild Depression   10-14 Moderate Depression   15-19 Moderately Severe Depression   20-27 Severe Depression    Screening for Cognitive Impairment    Three Minute Recall (village, kitchen, baby) 3/3    Gael clock face with all 12 numbers and set the hands to show 10 past 10.  Yes    Cognitive concerns identified deferred for follow up unless specifically addressed in assessment and plan.    Fall Risk Assessment    Has the patient had two or more falls in the last year or any fall with injury in the last year?  No    Safety Assessment    Throw rugs on floor.  Yes  Handrails on all stairs.  Yes  Good lighting in all hallways.  Yes  Difficulty hearing.  No  Patient counseled about all safety risks that were identified.    Functional Assessment ADLs    Are there any barriers preventing you from cooking for yourself or meeting nutritional needs?  No.    Are there any barriers preventing you from driving safely or obtaining transportation?  No.    Are there any barriers preventing you from using a  telephone or calling for help?  No.    Are there any barriers preventing you from shopping?  No.    Are there any barriers preventing you from taking care of your own finances?  No.    Are there any barriers preventing you from managing your medications?  No.    Are there any barriers preventing you from showering, bathing or dressing yourself?  No.    Are you currently engaging in any exercise or physical activity?  No.     What is your perception of your health?  Good.      Health Maintenance Summary                HEPATITIS C SCREENING Overdue 1950     IMM ZOSTER VACCINES Postponed 7/19/2020 Originally 12/25/2017. Patient Refused     Done 10/30/2017 Imm Admin: Zoster Vaccine Live (ZVL) (Zostavax)    COLONOSCOPY Next Due 5/28/2020      Done 5/28/2015 AMB REFERRAL TO GI FOR COLONOSCOPY    MAMMOGRAM Next Due 6/6/2020      Done 6/6/2019 MA-SCREENING MAMMO BILAT W/TOMOSYNTHESIS W/CAD     Patient has more history with this topic...    IMM DTaP/Tdap/Td Vaccine Next Due 2/17/2021      Done 2/17/2011 Imm Admin: Tdap Vaccine    Annual Wellness Visit Next Due 2/19/2021      Done 2/19/2020      Patient has more history with this topic...    BONE DENSITY Next Due 9/16/2024      Done 9/16/2019 DS-BONE DENSITY STUDY (DEXA)     Patient has more history with this topic...          Patient Care Team:  SHERMAN Marques as PCP - General (Family Medicine)  Irma Garg M.D. as Consulting Physician (Surgery)  VALENTÍN Jay M.D. (Inactive) as Consulting Physician (Oncology)  Gilberto Garcia M.D. as Consulting Physician (Ophthalmology)  Jagdish Crum Jr., M.D. as Consulting Physician (Gastroenterology)  Yuri Smith M.D. (Inactive) as Consulting Physician (Cardiology)  Christine Carey M.D. as Consulting Physician (Dermatology)  Abelardo Gregorio Ass't as          Social History     Tobacco Use   • Smoking status: Former Smoker     Packs/day: 0.25     Years: 10.00     Pack years:  "2.50     Types: Cigarettes     Last attempt to quit: 2000     Years since quittin.1   • Smokeless tobacco: Never Used   Substance Use Topics   • Alcohol use: Yes     Frequency: 2-3 times a week     Drinks per session: 1 or 2     Comment: glass of wine    • Drug use: No     Family History   Problem Relation Age of Onset   • Heart Disease Father    • Cancer Sister         breast with mets   • Hypertension Mother    • Lung Disease Mother         smoker   • Arthritis Mother         RA   • Stroke Mother    • Diabetes Maternal Uncle         type I   • Cancer Maternal Grandmother         Colon   • Diabetes Maternal Grandfather         type II   • Arthritis Daughter    • No Known Problems Daughter    • No Known Problems Paternal Grandmother    • No Known Problems Paternal Grandfather      She  has a past medical history of Cancer (HCC), Cataract, Gallstones, Glaucoma, Hyperlipidemia, Hyperlipidemia LDL goal <100 (2016), and Hypertension.   Past Surgical History:   Procedure Laterality Date   • HYSTERECTOMY LAPAROSCOPY  53 y/o    cervix and bilat ovaries removed.   • KNEE ARTHROSCOPY  58 y/o   • KNEE ARTHROSCOPY Right    • MASTECTOMY  age 62    right mastectomy   • TONSILLECTOMY AND ADENOIDECTOMY  5 y/o       Exam:   /70 (BP Location: Left arm, Patient Position: Sitting, BP Cuff Size: Adult)   Pulse 68   Temp 36.6 °C (97.8 °F) (Temporal)   Resp 16   Ht 1.638 m (5' 4.5\")   Wt 79.4 kg (175 lb)   SpO2 96%  Body mass index is 29.57 kg/m².    Hearing fair.    Dentition good  Alert, oriented in no acute distress.  Eye contact is good, speech goal directed, affect calm    Assessment and Plan. The following treatment and monitoring plan is recommended:      1. Medicare annual wellness visit, subsequent     2. Essential hypertension  Stable, controlled.  Continue medication as prescribed.   3. History of breast cancer  Stable, in remission.  Continue medication as prescribed.  Records requested from " oncologist.   4. CKD (chronic kidney disease), stage II   Labs ordered to reevaluate kidney function.  Comp Metabolic Panel   5. Colon cancer screening  REFERRAL TO GI FOR COLONOSCOPY   6. Screening for thyroid disorder  TSH    FREE THYROXINE   7. Mixed hyperlipidemia   Stable, controlled with lifestyle interventions.   8. Osteopenia of multiple sites   stable, controlled.  Continue to take over-the-counter vitamin D and calcium supplement daily.  Continue daily physical activity.   9. Screening for Hepatitis C virus screening for low risk patient Hep C virus antibody ordered.       Services suggested: No services needed at this time  Health Care Screening: Age-appropriate preventive services recommended by USPTF and ACIP covered by Medicare were discussed today. Services ordered if indicated and agreed upon by the patient.  Referrals offered: Community-based lifestyle interventions to reduce health risks and promote self-management and wellness, fall prevention, nutrition, physical activity, tobacco-use cessation, weight loss, and mental health services as per orders if indicated.    Discussion today about general wellness and lifestyle habits:    · Prevent falls and reduce trip hazards; Cautioned about securing or removing rugs.  · Have a working fire alarm and carbon monoxide detector;   · Engage in regular physical activity and social activities     Follow-up: Return in about 6 months (around 8/19/2020), or if symptoms worsen or fail to improve, for HTN.

## 2020-02-19 NOTE — PROGRESS NOTES
Annual Health Assessment Questions:    1.  Are you currently engaging in any exercise or physical activity? {YES / NO:39547}    2.  How would you describe your mood or emotional well-being today? {ahamood:03516}    3.  Have you had any falls in the last year? {YES / NO:27139}    4.  Have you noticed any problems with your balance or had difficulty walking? {YES / NO:21362}    5.  In the last six months have you experienced any leakage of urine? {YES / NO:21362}    6. DPA/Advanced Directive: {[place] ADVANCED DIRECTIVE:90938}

## 2020-02-20 ENCOUNTER — HOSPITAL ENCOUNTER (OUTPATIENT)
Dept: LAB | Facility: MEDICAL CENTER | Age: 70
End: 2020-02-20
Attending: NURSE PRACTITIONER
Payer: MEDICARE

## 2020-02-20 DIAGNOSIS — Z13.29 SCREENING FOR THYROID DISORDER: ICD-10-CM

## 2020-02-20 DIAGNOSIS — Z11.59 ENCOUNTER FOR HEPATITIS C SCREENING TEST FOR LOW RISK PATIENT: ICD-10-CM

## 2020-02-20 DIAGNOSIS — N18.2 CKD (CHRONIC KIDNEY DISEASE), STAGE II: ICD-10-CM

## 2020-02-20 LAB
ALBUMIN SERPL BCP-MCNC: 4.3 G/DL (ref 3.2–4.9)
ALBUMIN/GLOB SERPL: 1.4 G/DL
ALP SERPL-CCNC: 53 U/L (ref 30–99)
ALT SERPL-CCNC: 37 U/L (ref 2–50)
ANION GAP SERPL CALC-SCNC: 9 MMOL/L (ref 0–11.9)
AST SERPL-CCNC: 28 U/L (ref 12–45)
BILIRUB SERPL-MCNC: 0.7 MG/DL (ref 0.1–1.5)
BUN SERPL-MCNC: 22 MG/DL (ref 8–22)
CALCIUM SERPL-MCNC: 9.8 MG/DL (ref 8.5–10.5)
CHLORIDE SERPL-SCNC: 108 MMOL/L (ref 96–112)
CO2 SERPL-SCNC: 25 MMOL/L (ref 20–33)
CREAT SERPL-MCNC: 1.02 MG/DL (ref 0.5–1.4)
GLOBULIN SER CALC-MCNC: 3 G/DL (ref 1.9–3.5)
GLUCOSE SERPL-MCNC: 86 MG/DL (ref 65–99)
HCV AB SER QL: NEGATIVE
POTASSIUM SERPL-SCNC: 4 MMOL/L (ref 3.6–5.5)
PROT SERPL-MCNC: 7.3 G/DL (ref 6–8.2)
SODIUM SERPL-SCNC: 142 MMOL/L (ref 135–145)
T4 FREE SERPL-MCNC: 0.84 NG/DL (ref 0.53–1.43)
TSH SERPL DL<=0.005 MIU/L-ACNC: 1.61 UIU/ML (ref 0.38–5.33)

## 2020-02-20 PROCEDURE — 86803 HEPATITIS C AB TEST: CPT

## 2020-02-20 PROCEDURE — 80053 COMPREHEN METABOLIC PANEL: CPT

## 2020-02-20 PROCEDURE — 84439 ASSAY OF FREE THYROXINE: CPT

## 2020-02-20 PROCEDURE — 84443 ASSAY THYROID STIM HORMONE: CPT

## 2020-02-20 PROCEDURE — 36415 COLL VENOUS BLD VENIPUNCTURE: CPT

## 2020-06-12 ENCOUNTER — HOSPITAL ENCOUNTER (OUTPATIENT)
Dept: RADIOLOGY | Facility: MEDICAL CENTER | Age: 70
End: 2020-06-12
Attending: INTERNAL MEDICINE
Payer: MEDICARE

## 2020-06-12 DIAGNOSIS — Z12.31 VISIT FOR SCREENING MAMMOGRAM: ICD-10-CM

## 2020-06-12 PROCEDURE — 77067 SCR MAMMO BI INCL CAD: CPT

## 2020-06-30 ENCOUNTER — PATIENT MESSAGE (OUTPATIENT)
Dept: HEALTH INFORMATION MANAGEMENT | Facility: OTHER | Age: 70
End: 2020-06-30

## 2020-06-30 NOTE — PROGRESS NOTES
*my chart message sent 6/30/2020* I tried to reach you by phone 6/30/2020. Your appointment with Dr Goyal for 7/1/2020, has been cancelled. Her scheduled has been over booked with [facility]. I have added you to wait list with Chang at Dr Goyal's office.   I do have two other providers at that location if you would like to see them prior to getting into with Dr Goyal. I am sorry for this inconvenience. Please let me know how I may further assist you.

## 2020-07-06 ENCOUNTER — HOSPITAL ENCOUNTER (OUTPATIENT)
Dept: RADIOLOGY | Facility: MEDICAL CENTER | Age: 70
End: 2020-07-06
Attending: INTERNAL MEDICINE
Payer: MEDICARE

## 2020-07-06 DIAGNOSIS — C50.911 MALIGNANT NEOPLASM OF RIGHT FEMALE BREAST, UNSPECIFIED ESTROGEN RECEPTOR STATUS, UNSPECIFIED SITE OF BREAST (HCC): ICD-10-CM

## 2020-07-06 PROCEDURE — 76641 ULTRASOUND BREAST COMPLETE: CPT

## 2020-07-10 ENCOUNTER — OFFICE VISIT (OUTPATIENT)
Dept: MEDICAL GROUP | Facility: PHYSICIAN GROUP | Age: 70
End: 2020-07-10
Payer: MEDICARE

## 2020-07-10 VITALS
HEART RATE: 91 BPM | HEIGHT: 65 IN | WEIGHT: 174.6 LBS | TEMPERATURE: 97.1 F | OXYGEN SATURATION: 94 % | BODY MASS INDEX: 29.09 KG/M2 | RESPIRATION RATE: 14 BRPM | SYSTOLIC BLOOD PRESSURE: 100 MMHG | DIASTOLIC BLOOD PRESSURE: 62 MMHG

## 2020-07-10 DIAGNOSIS — Z17.0 MALIGNANT NEOPLASM OF UPPER-OUTER QUADRANT OF RIGHT BREAST IN FEMALE, ESTROGEN RECEPTOR POSITIVE (HCC): ICD-10-CM

## 2020-07-10 DIAGNOSIS — R61 UNEXPLAINED NIGHT SWEATS: ICD-10-CM

## 2020-07-10 DIAGNOSIS — I10 ESSENTIAL HYPERTENSION: ICD-10-CM

## 2020-07-10 DIAGNOSIS — C50.411 MALIGNANT NEOPLASM OF UPPER-OUTER QUADRANT OF RIGHT BREAST IN FEMALE, ESTROGEN RECEPTOR POSITIVE (HCC): ICD-10-CM

## 2020-07-10 DIAGNOSIS — E78.2 MIXED HYPERLIPIDEMIA: ICD-10-CM

## 2020-07-10 DIAGNOSIS — N18.30 CKD (CHRONIC KIDNEY DISEASE) STAGE 3, GFR 30-59 ML/MIN: ICD-10-CM

## 2020-07-10 PROCEDURE — 99214 OFFICE O/P EST MOD 30 MIN: CPT | Performed by: FAMILY MEDICINE

## 2020-07-10 ASSESSMENT — FIBROSIS 4 INDEX: FIB4 SCORE: 1.22

## 2020-07-10 NOTE — ASSESSMENT & PLAN NOTE
Chronic health problem for which the patient previously was drinking a great deal of coffee and that is when her GFR dropped.  Her GFR probably is better now that she is drinking more water and less coffee.  We will recheck that for her.

## 2020-07-10 NOTE — ASSESSMENT & PLAN NOTE
This is a chronic health problem that is well controlled with current medications and lifestyle measures. Her BP is excellent at 100/62.

## 2020-07-10 NOTE — ASSESSMENT & PLAN NOTE
This is a chronic health problem that is well controlled with current medications and lifestyle measures. On her last lipid panel she had good numbers.  Her pravastatin at 20mg has done well and we will recheck all of this in 3 months.

## 2020-07-10 NOTE — PROGRESS NOTES
CC:Diagnoses of Malignant neoplasm of upper-outer quadrant of right breast in female, estrogen receptor positive (HCC), CKD (chronic kidney disease) stage 3, GFR 30-59 ml/min (HCC), Essential hypertension, Mixed hyperlipidemia, and Unexplained night sweats were pertinent to this visit.    HISTORY OF PRESENT ILLNESS: Patient is a 70 y.o. female established patient who presents today to talk about her chronic health problems as well as a current problem which is night sweats and just not feeling well during the day.  Patient finds that she has to take a nap in the afternoons most afternoons.  I would question that she may have sleep apnea.  At this point I want to finish the work-up for night sweats and then we will see her back in 2 weeks and determine next steps.      Malignant neoplasm of upper-outer quadrant of right breast in female, estrogen receptor positive (HCC)  This is a chronic health problem for this patient that continues on Femara for 2 more years. She is doing well currently.    CKD (chronic kidney disease) stage 3, GFR 30-59 ml/min (HCC)  Chronic health problem for which the patient previously was drinking a great deal of coffee and that is when her GFR dropped.  Her GFR probably is better now that she is drinking more water and less coffee.  We will recheck that for her.    Essential hypertension  This is a chronic health problem that is well controlled with current medications and lifestyle measures. Her BP is excellent at 100/62.    Mixed hyperlipidemia  This is a chronic health problem that is well controlled with current medications and lifestyle measures. On her last lipid panel she had good numbers.  Her pravastatin at 20mg has done well and we will recheck all of this in 3 months.    Unexplained night sweats  This is a problem for this patient that has gone on for some time.  She is having night sweats most nights and unusual fatigue.  She is not sleeping well.  She is still not feeling well.   We had some labs on her but did not get a TPO or CBC.  We will get those labs and discuss at her next visit.      Patient Active Problem List    Diagnosis Date Noted   • CKD (chronic kidney disease) stage 3, GFR 30-59 ml/min ([facility]) 2017     Priority: Low   • History of breast cancer 2016     Priority: Low   • Mixed hyperlipidemia 2016     Priority: Low   • Malignant neoplasm of upper-outer quadrant of right breast in female, estrogen receptor positive ([facility]) 07/10/2020   • Unexplained night sweats 07/10/2020   • Overweight (BMI 25.0-29.9) 2017   • Essential hypertension 2016   • Glaucoma 2016   • Osteopenia 2016      Allergies:Patient has no known allergies.    Current Outpatient Medications   Medication Sig Dispense Refill   • vitamin D (CHOLECALCIFEROL) 1000 Unit (25 mcg) Tab Take 1,000 Units by mouth every day.     • amlodipine-benazepril (LOTREL) 5-10 MG per capsule Take 1 Cap by mouth every day. 100 Cap 2   • pravastatin (PRAVACHOL) 20 MG Tab Take 1 Tab by mouth every bedtime. 100 Tab 1   • letrozole (FEMARA) 2.5 MG Tab Take 2.5 mg by mouth every day. Indications: Cancer of the Breast     • calcium carbonate (OS-OMERO 500) 1250 MG Tab Take 1,250 mg by mouth 2 times a day, with meals.       No current facility-administered medications for this visit.        Social History     Tobacco Use   • Smoking status: Former Smoker     Packs/day: 0.25     Years: 10.00     Pack years: 2.50     Types: Cigarettes     Last attempt to quit: 2000     Years since quittin.5   • Smokeless tobacco: Never Used   Substance Use Topics   • Alcohol use: Yes     Frequency: 2-3 times a week     Drinks per session: 1 or 2     Comment: glass of wine    • Drug use: No     Social History     Social History Narrative   • Not on file       Family History   Problem Relation Age of Onset   • Heart Disease Father    • Cancer Sister         breast with mets   • Hypertension Mother    • Lung Disease  "Mother         smoker   • Arthritis Mother         RA   • Stroke Mother    • Diabetes Maternal Uncle         type I   • Cancer Maternal Grandmother         Colon   • Diabetes Maternal Grandfather         type II   • Arthritis Daughter    • No Known Problems Daughter    • No Known Problems Paternal Grandmother    • No Known Problems Paternal Grandfather         ROS:     - Constitutional: As if her fatigue malaise on a nearly daily basis but no fevers chills or unexpected weight change.     - HEENT:  Negative for headaches, vision changes, hearing changes, ear pain, ear discharge, rhinorrhea, sinus congestion, sore throat, and neck pain.      - Respiratory: Negative for cough, sputum production, chest congestion, dyspnea, wheezing, and crackles.      - Cardiovascular: Negative for chest pain, palpitations, orthopnea, and bilateral lower extremity edema.     - Gastrointestinal: Negative for heartburn, nausea, vomiting, abdominal pain, hematochezia, melena, diarrhea, constipation, and greasy/foul-smelling stools.     - Genitourinary: Negative for dysuria, polyuria, hematuria, pyuria, urinary urgency, and urinary incontinence.     - Musculoskeletal: Negative for myalgias, back pain, and joint pain.     - Skin: Negative for rash, itching, cyanotic skin color change.     - Neurological: Negative for dizziness, tingling, tremors, focal sensory deficit, focal weakness and headaches.     - Endo/Heme/Allergies: Questionable night sweats on a nearly nightly basis of unclear etiology.  Could be related to her medications but we need to rule out other causes.      - Psychiatric/Behavioral: Negative for depression, suicidal/homicidal ideation and memory loss.          - NOTE: All other systems reviewed and are negative, except as in HPI.      Exam:    /62 (BP Location: Left arm, Patient Position: Sitting, BP Cuff Size: Adult)   Pulse 91   Temp 36.2 °C (97.1 °F) (Temporal)   Resp 14   Ht 1.638 m (5' 4.5\")   Wt 79.2 kg " (174 lb 9.6 oz)   SpO2 94%  Body mass index is 29.51 kg/m².    General:  Well nourished, well developed female in NAD  .  Patient was seen for 25 minutes face to face of which, 20 minutes was spent counseling regarding current problems, review of labs and discussion of current problem of night sweats and the work-up..    Please note that this dictation was created using voice recognition software. I have made every reasonable attempt to correct obvious errors, but I expect that there are errors of grammar and possibly content that I did not discover before finalizing the note.    Assessment/Plan:  1. Malignant neoplasm of upper-outer quadrant of right breast in female, estrogen receptor positive (HCC)  Adequately controlled the patient has to continue on Femara for an additional 2 years which may be the etiology of her night sweats and discomfort.  We will look for other causes.    2. CKD (chronic kidney disease) stage 3, GFR 30-59 ml/min ([facility])  Her last GFR had come up to 54 and she has made lifestyle changes.  We will hope this is better with her next set of labs.    3. Essential hypertension  Controlled, continue with current meds and lifestyle.      4. Mixed hyperlipidemia  Unknown control but patient will be getting blood work in the coming 2 weeks and we will discuss at her next appointment  - Lipid Profile; Future    5. Unexplained night sweats  Uncontrolled, patient had a partial thyroid work-up including TSH and T4 but did not have TPO antibodies.  She would like to have those checked and that is reasonable.  We will also do a CBC to make certain we do not have subtype of a hematological problem causing her night sweats.  - THYROID PEROXIDASE  (TPO) AB; Future  - CBC WITH DIFFERENTIAL; Future

## 2020-07-10 NOTE — ASSESSMENT & PLAN NOTE
This is a problem for this patient that has gone on for some time.  She is having night sweats most nights and unusual fatigue.  She is not sleeping well.  She is still not feeling well.  We had some labs on her but did not get a TPO or CBC.  We will get those labs and discuss at her next visit.

## 2020-07-10 NOTE — ASSESSMENT & PLAN NOTE
This is a chronic health problem for this patient that continues on Femara for 2 more years. She is doing well currently.

## 2020-07-13 ENCOUNTER — HOSPITAL ENCOUNTER (OUTPATIENT)
Dept: LAB | Facility: MEDICAL CENTER | Age: 70
End: 2020-07-13
Attending: FAMILY MEDICINE
Payer: MEDICARE

## 2020-07-13 DIAGNOSIS — R61 UNEXPLAINED NIGHT SWEATS: ICD-10-CM

## 2020-07-13 DIAGNOSIS — E78.2 MIXED HYPERLIPIDEMIA: ICD-10-CM

## 2020-07-13 LAB
BASOPHILS # BLD AUTO: 1.2 % (ref 0–1.8)
BASOPHILS # BLD: 0.06 K/UL (ref 0–0.12)
CHOLEST SERPL-MCNC: 161 MG/DL (ref 100–199)
EOSINOPHIL # BLD AUTO: 0.11 K/UL (ref 0–0.51)
EOSINOPHIL NFR BLD: 2.2 % (ref 0–6.9)
ERYTHROCYTE [DISTWIDTH] IN BLOOD BY AUTOMATED COUNT: 40.4 FL (ref 35.9–50)
FASTING STATUS PATIENT QL REPORTED: NORMAL
HCT VFR BLD AUTO: 46.9 % (ref 37–47)
HDLC SERPL-MCNC: 65 MG/DL
HGB BLD-MCNC: 15.4 G/DL (ref 12–16)
IMM GRANULOCYTES # BLD AUTO: 0.02 K/UL (ref 0–0.11)
IMM GRANULOCYTES NFR BLD AUTO: 0.4 % (ref 0–0.9)
LDLC SERPL CALC-MCNC: 75 MG/DL
LYMPHOCYTES # BLD AUTO: 1.11 K/UL (ref 1–4.8)
LYMPHOCYTES NFR BLD: 22.3 % (ref 22–41)
MCH RBC QN AUTO: 29.2 PG (ref 27–33)
MCHC RBC AUTO-ENTMCNC: 32.8 G/DL (ref 33.6–35)
MCV RBC AUTO: 88.8 FL (ref 81.4–97.8)
MONOCYTES # BLD AUTO: 0.47 K/UL (ref 0–0.85)
MONOCYTES NFR BLD AUTO: 9.4 % (ref 0–13.4)
NEUTROPHILS # BLD AUTO: 3.21 K/UL (ref 2–7.15)
NEUTROPHILS NFR BLD: 64.5 % (ref 44–72)
NRBC # BLD AUTO: 0 K/UL
NRBC BLD-RTO: 0 /100 WBC
PLATELET # BLD AUTO: 275 K/UL (ref 164–446)
PMV BLD AUTO: 9.6 FL (ref 9–12.9)
RBC # BLD AUTO: 5.28 M/UL (ref 4.2–5.4)
THYROPEROXIDASE AB SERPL-ACNC: 115 IU/ML (ref 0–9)
TRIGL SERPL-MCNC: 103 MG/DL (ref 0–149)
WBC # BLD AUTO: 5 K/UL (ref 4.8–10.8)

## 2020-07-13 PROCEDURE — 80061 LIPID PANEL: CPT

## 2020-07-13 PROCEDURE — 85025 COMPLETE CBC W/AUTO DIFF WBC: CPT

## 2020-07-13 PROCEDURE — 36415 COLL VENOUS BLD VENIPUNCTURE: CPT

## 2020-07-13 PROCEDURE — 86376 MICROSOMAL ANTIBODY EACH: CPT

## 2020-07-22 ENCOUNTER — OFFICE VISIT (OUTPATIENT)
Dept: MEDICAL GROUP | Facility: PHYSICIAN GROUP | Age: 70
End: 2020-07-22
Payer: MEDICARE

## 2020-07-22 VITALS
OXYGEN SATURATION: 97 % | DIASTOLIC BLOOD PRESSURE: 64 MMHG | RESPIRATION RATE: 14 BRPM | TEMPERATURE: 97.7 F | HEART RATE: 75 BPM | BODY MASS INDEX: 29.36 KG/M2 | SYSTOLIC BLOOD PRESSURE: 120 MMHG | WEIGHT: 176.2 LBS | HEIGHT: 65 IN

## 2020-07-22 DIAGNOSIS — E06.3 HASHIMOTO'S THYROIDITIS: ICD-10-CM

## 2020-07-22 DIAGNOSIS — N18.30 CKD (CHRONIC KIDNEY DISEASE) STAGE 3, GFR 30-59 ML/MIN: ICD-10-CM

## 2020-07-22 DIAGNOSIS — E78.2 MIXED HYPERLIPIDEMIA: ICD-10-CM

## 2020-07-22 DIAGNOSIS — I10 ESSENTIAL HYPERTENSION: ICD-10-CM

## 2020-07-22 PROCEDURE — 99214 OFFICE O/P EST MOD 30 MIN: CPT | Performed by: FAMILY MEDICINE

## 2020-07-22 RX ORDER — BENAZEPRIL HYDROCHLORIDE 10 MG/1
10 TABLET ORAL DAILY
Qty: 90 TAB | Refills: 3 | Status: SHIPPED | OUTPATIENT
Start: 2020-07-22 | End: 2021-01-15

## 2020-07-22 ASSESSMENT — FIBROSIS 4 INDEX: FIB4 SCORE: 1.15

## 2020-07-22 NOTE — ASSESSMENT & PLAN NOTE
This is a new problem for the patient where she has been having problems with heat and cold intolerance so we went ahead and checked a TSH T4 and a TPO antibody.  Her TSH is normal at 1.610, free T4 normal at 0.84 but her TPO antibody is elevated at 115 with the upper limits of normal being 9.0.  Because her thyroid is still functioning appropriately we will wait another 3 months and recheck those numbers to see if there is been any fluctuation.  At this point she does not need replacement.

## 2020-07-22 NOTE — ASSESSMENT & PLAN NOTE
This is a chronic health problem that is well controlled with current medications and lifestyle measures. Her GFR

## 2020-07-22 NOTE — ASSESSMENT & PLAN NOTE
This is a chronic health problem that is well controlled with current medications and lifestyle measures.  Her cholesterol is excellent with a total cholesterol of 161, triglycerides 103, HDL good at 65 LDL is very good at 75.  There is no liver dysfunction.  She will continue with current meds.

## 2020-07-22 NOTE — PROGRESS NOTES
CC:Diagnoses of CKD (chronic kidney disease) stage 3, GFR 30-59 ml/min (HCC), Essential hypertension, Mixed hyperlipidemia, and Hashimoto's thyroiditis were pertinent to this visit.    HISTORY OF PRESENT ILLNESS: Patient is a 70 y.o. female established patient who presents today to talk about her chronic health problems and go over her labs that were done prior to the visit.      CKD (chronic kidney disease) stage 3, GFR 30-59 ml/min (HCC)  This is a chronic health problem that is well controlled with current medications and lifestyle measures. Her GFR     Essential hypertension  This is a chronic health problem that is well controlled with current medications and lifestyle measures. Her BP is excellent at 120/64, but the patient tells me that if she is squatting or bending over and stands up quickly she will have the room actually spinning and she feels as if she is going to fall or get dizzy.  I am concerned that her blood pressure medicine may be too strong.  She is on amlodipine/benazepril 5/10.  I am going to put her on benazepril just 10 mg and take away the amlodipine and see if she still has good blood pressure control but a little bit less dizziness or postural dizziness.    Mixed hyperlipidemia  This is a chronic health problem that is well controlled with current medications and lifestyle measures.  Her cholesterol is excellent with a total cholesterol of 161, triglycerides 103, HDL good at 65 LDL is very good at 75.  There is no liver dysfunction.  She will continue with current meds.    Hashimoto's thyroiditis  This is a new problem for the patient where she has been having problems with heat and cold intolerance so we went ahead and checked a TSH T4 and a TPO antibody.  Her TSH is normal at 1.610, free T4 normal at 0.84 but her TPO antibody is elevated at 115 with the upper limits of normal being 9.0.  Because her thyroid is still functioning appropriately we will wait another 3 months and recheck  those numbers to see if there is been any fluctuation.  At this point she does not need replacement.      Patient Active Problem List    Diagnosis Date Noted   • CKD (chronic kidney disease) stage 3, GFR 30-59 ml/min ([facility]) 2017     Priority: Low   • History of breast cancer 2016     Priority: Low   • Mixed hyperlipidemia 2016     Priority: Low   • Hashimoto's thyroiditis 2020   • Malignant neoplasm of upper-outer quadrant of right breast in female, estrogen receptor positive (HCC) 07/10/2020   • Unexplained night sweats 07/10/2020   • Overweight (BMI 25.0-29.9) 2017   • Essential hypertension 2016   • Glaucoma 2016   • Osteopenia 2016      Allergies:Patient has no known allergies.    Current Outpatient Medications   Medication Sig Dispense Refill   • benazepril (LOTENSIN) 10 MG Tab Take 1 Tab by mouth every day. 90 Tab 3   • vitamin D (CHOLECALCIFEROL) 1000 Unit (25 mcg) Tab Take 1,000 Units by mouth every day.     • pravastatin (PRAVACHOL) 20 MG Tab Take 1 Tab by mouth every bedtime. 100 Tab 1   • letrozole (FEMARA) 2.5 MG Tab Take 2.5 mg by mouth every day. Indications: Cancer of the Breast     • calcium carbonate (OS-OMERO 500) 1250 MG Tab Take 1,250 mg by mouth 2 times a day, with meals.       No current facility-administered medications for this visit.        Social History     Tobacco Use   • Smoking status: Former Smoker     Packs/day: 0.25     Years: 10.00     Pack years: 2.50     Types: Cigarettes     Last attempt to quit: 2000     Years since quittin.5   • Smokeless tobacco: Never Used   Substance Use Topics   • Alcohol use: Yes     Frequency: 2-3 times a week     Drinks per session: 1 or 2     Comment: glass of wine    • Drug use: No     Social History     Social History Narrative   • Not on file       Family History   Problem Relation Age of Onset   • Heart Disease Father    • Cancer Sister         breast with mets   • Hypertension Mother    • Lung  "Disease Mother         smoker   • Arthritis Mother         RA   • Stroke Mother    • Diabetes Maternal Uncle         type I   • Cancer Maternal Grandmother         Colon   • Diabetes Maternal Grandfather         type II   • Arthritis Daughter    • No Known Problems Daughter    • No Known Problems Paternal Grandmother    • No Known Problems Paternal Grandfather         ROS:     - Constitutional:  Negative for fever, chills, unexpected weight change, and fatigue/generalized weakness.    - HEENT:  Negative for headaches, vision changes, hearing changes, ear pain, ear discharge, rhinorrhea, sinus congestion, sore throat, and neck pain.      - Respiratory: Negative for cough, sputum production, chest congestion, dyspnea, wheezing, and crackles.      - Cardiovascular: Negative for chest pain, palpitations, orthopnea, and bilateral lower extremity edema.     - Gastrointestinal: Negative for heartburn, nausea, vomiting, abdominal pain, hematochezia, melena, diarrhea, constipation, and greasy/foul-smelling stools.     - Genitourinary: Negative for dysuria, polyuria, hematuria, pyuria, urinary urgency, and urinary incontinence.     - Musculoskeletal: Negative for myalgias, back pain, and joint pain.     - Skin: Negative for rash, itching, cyanotic skin color change.     - Neurological: Negative for dizziness, tingling, tremors, focal sensory deficit, focal weakness and headaches.     - Endo/Heme/Allergies: Does not bruise/bleed easily.     - Psychiatric/Behavioral: Negative for depression, suicidal/homicidal ideation and memory loss.          - NOTE: All other systems reviewed and are negative, except as in HPI.      Exam:    /64 (BP Location: Right arm, Patient Position: Sitting, BP Cuff Size: Adult)   Pulse 75   Temp 36.5 °C (97.7 °F) (Temporal)   Resp 14   Ht 1.638 m (5' 4.5\")   Wt 79.9 kg (176 lb 3.2 oz)   SpO2 97%  Body mass index is 29.78 kg/m².    General:  Well nourished, well developed female in NAD  Head " is grossly normal.    LABS: 7/13/2020: Results reviewed and discussed with the patient, questions answered.    Please note that this dictation was created using voice recognition software. I have made every reasonable attempt to correct obvious errors, but I expect that there are errors of grammar and possibly content that I did not discover before finalizing the note.    Assessment/Plan:  1. CKD (chronic kidney disease) stage 3, GFR 30-59 ml/min (HCC)  Improved with increased fluid intake.  Her GFR is now 54 up from 50.  We will continue to follow    2. Essential hypertension  Patient's blood pressure may be overtreated because she is having a great deal of dizziness with changing positions.  We will drop her medication to just benazepril 10 mg daily and see her back in 5 weeks.  As long as her blood pressures 130/80 or less I will consider that good control    3. Mixed hyperlipidemia  Controlled, continue with current meds and lifestyle.      4. Hashimoto's thyroiditis  Controlled, patient does not currently need thyroid replacement because her thyroid is still functioning despite the antibodies.

## 2020-07-22 NOTE — ASSESSMENT & PLAN NOTE
This is a chronic health problem that is well controlled with current medications and lifestyle measures. Her BP is excellent at 120/64, but the patient tells me that if she is squatting or bending over and stands up quickly she will have the room actually spinning and she feels as if she is going to fall or get dizzy.  I am concerned that her blood pressure medicine may be too strong.  She is on amlodipine/benazepril 5/10.  I am going to put her on benazepril just 10 mg and take away the amlodipine and see if she still has good blood pressure control but a little bit less dizziness or postural dizziness.

## 2020-08-26 ENCOUNTER — OFFICE VISIT (OUTPATIENT)
Dept: MEDICAL GROUP | Facility: PHYSICIAN GROUP | Age: 70
End: 2020-08-26
Payer: MEDICARE

## 2020-08-26 VITALS
DIASTOLIC BLOOD PRESSURE: 80 MMHG | HEART RATE: 97 BPM | WEIGHT: 173.6 LBS | TEMPERATURE: 97.3 F | RESPIRATION RATE: 16 BRPM | BODY MASS INDEX: 28.92 KG/M2 | HEIGHT: 65 IN | OXYGEN SATURATION: 95 % | SYSTOLIC BLOOD PRESSURE: 121 MMHG

## 2020-08-26 DIAGNOSIS — I10 ESSENTIAL HYPERTENSION: ICD-10-CM

## 2020-08-26 DIAGNOSIS — E78.2 MIXED HYPERLIPIDEMIA: ICD-10-CM

## 2020-08-26 DIAGNOSIS — R40.0 UNCONTROLLED DAYTIME SOMNOLENCE: ICD-10-CM

## 2020-08-26 PROCEDURE — 99214 OFFICE O/P EST MOD 30 MIN: CPT | Performed by: FAMILY MEDICINE

## 2020-08-26 ASSESSMENT — FIBROSIS 4 INDEX: FIB4 SCORE: 1.15

## 2020-08-26 NOTE — ASSESSMENT & PLAN NOTE
This is a chronic health problem for this patient where we made some changes in her medication taking her off the amlodipine and leaving her just on benazepril 10 mg daily.  Her blood pressure initially was elevated at 140/88 but she also was a little upset talking about her colonoscopy.  On recheck it is 121/80.  I think we have her blood pressure adequately controlled.  She will continue with current meds.  She does still get the lightheadedness if she if she bends over.  She is going to have to be careful about that because that is a common side effect of the benazepril.

## 2020-08-26 NOTE — ASSESSMENT & PLAN NOTE
Pt continues to have problems with excessive fatigue during the day.  She admits that she is never been a very good sleeper and now she is having hot flashes which sometimes awakens her or if she hears anything, she cannot get back to sleep. She is interested in trying something to make this better.  I want to check and see if she has sleep apnea. She does try to exercise walking inside and finds this tires her excessively and she has to nap when she gets home.

## 2020-08-26 NOTE — PROGRESS NOTES
CC:Diagnoses of Essential hypertension, Uncontrolled daytime somnolence, and Mixed hyperlipidemia were pertinent to this visit.    HISTORY OF PRESENT ILLNESS: Patient is a 70 y.o. female established patient who presents today to talk about her chronic health problems.      Essential hypertension  This is a chronic health problem for this patient where we made some changes in her medication taking her off the amlodipine and leaving her just on benazepril 10 mg daily.  Her blood pressure initially was elevated at 140/88 but she also was a little upset talking about her colonoscopy.  On recheck it is 121/80.  I think we have her blood pressure adequately controlled.  She will continue with current meds.  She does still get the lightheadedness if she if she bends over.  She is going to have to be careful about that because that is a common side effect of the benazepril.    Uncontrolled daytime somnolence  Pt continues to have problems with excessive fatigue during the day.  She admits that she is never been a very good sleeper and now she is having hot flashes which sometimes awakens her or if she hears anything, she cannot get back to sleep. She is interested in trying something to make this better.  I want to check and see if she has sleep apnea. She does try to exercise walking inside and finds this tires her excessively and she has to nap when she gets home.       Patient Active Problem List    Diagnosis Date Noted   • CKD (chronic kidney disease) stage 3, GFR 30-59 ml/min ([facility]) 08/16/2017     Priority: Low   • History of breast cancer 01/12/2016     Priority: Low   • Mixed hyperlipidemia 01/12/2016     Priority: Low   • Uncontrolled daytime somnolence 08/26/2020   • Hashimoto's thyroiditis 07/22/2020   • Malignant neoplasm of upper-outer quadrant of right breast in female, estrogen receptor positive (HCC) 07/10/2020   • Unexplained night sweats 07/10/2020   • Overweight (BMI 25.0-29.9) 08/23/2017   • Essential  hypertension 2016   • Glaucoma 2016   • Osteopenia 2016      Allergies:Patient has no known allergies.    Current Outpatient Medications   Medication Sig Dispense Refill   • pravastatin (PRAVACHOL) 20 MG Tab TAKE ONE TABLET BY MOUTH AT BEDTIME  100 Tab 3   • benazepril (LOTENSIN) 10 MG Tab Take 1 Tab by mouth every day. 90 Tab 3   • vitamin D (CHOLECALCIFEROL) 1000 Unit (25 mcg) Tab Take 1,000 Units by mouth every day.     • letrozole (FEMARA) 2.5 MG Tab Take 2.5 mg by mouth every day. Indications: Cancer of the Breast     • calcium carbonate (OS-OMERO 500) 1250 MG Tab Take 1,250 mg by mouth 2 times a day, with meals.       No current facility-administered medications for this visit.        Social History     Tobacco Use   • Smoking status: Former Smoker     Packs/day: 0.25     Years: 10.00     Pack years: 2.50     Types: Cigarettes     Quit date: 2000     Years since quittin.6   • Smokeless tobacco: Never Used   Substance Use Topics   • Alcohol use: Yes     Frequency: 2-3 times a week     Drinks per session: 1 or 2     Comment: glass of wine    • Drug use: No     Social History     Social History Narrative   • Not on file       Family History   Problem Relation Age of Onset   • Heart Disease Father    • Cancer Sister         breast with mets   • Hypertension Mother    • Lung Disease Mother         smoker   • Arthritis Mother         RA   • Stroke Mother    • Diabetes Maternal Uncle         type I   • Cancer Maternal Grandmother         Colon   • Diabetes Maternal Grandfather         type II   • Arthritis Daughter    • No Known Problems Daughter    • No Known Problems Paternal Grandmother    • No Known Problems Paternal Grandfather         ROS:     - Constitutional:  Negative for fever, chills, unexpected weight change, and fatigue/generalized weakness.    - HEENT:  Negative for headaches, vision changes, hearing changes, ear pain, ear discharge, rhinorrhea, sinus congestion, sore throat,  "and neck pain.      - Respiratory: Negative for cough, sputum production, chest congestion, dyspnea, wheezing, and crackles.      - Cardiovascular: Negative for chest pain, palpitations, orthopnea, and bilateral lower extremity edema.     - Gastrointestinal: Negative for heartburn, nausea, vomiting, abdominal pain, hematochezia, melena, diarrhea, constipation, and greasy/foul-smelling stools.     - Genitourinary: Negative for dysuria, polyuria, hematuria, pyuria, urinary urgency, and urinary incontinence.     - Musculoskeletal: Negative for myalgias, back pain, and joint pain.     - Skin: Negative for rash, itching, cyanotic skin color change.     - Neurological: Negative for dizziness, tingling, tremors, focal sensory deficit, focal weakness and headaches.     - Endo/Heme/Allergies: Does not bruise/bleed easily.     - Psychiatric/Behavioral: Negative for depression, suicidal/homicidal ideation and memory loss.          - NOTE: All other systems reviewed and are negative, except as in HPI.      Exam:    /80 (BP Location: Left arm, Patient Position: Sitting, BP Cuff Size: Adult)   Pulse 97   Temp 36.3 °C (97.3 °F) (Temporal)   Resp 16   Ht 1.638 m (5' 4.5\")   Wt 78.7 kg (173 lb 9.6 oz)   SpO2 95%  Body mass index is 29.34 kg/m².    General:  Well nourished, well developed female in NAD  Head is grossly normal.  Neck: Supple without JVD or bruit. Thyroid is not enlarged.  Pulmonary: Clear to ausculation and percussion.  Normal effort. No rales, ronchi, or wheezing.  Cardiovascular: Regular rate and rhythm without murmur. Carotid and radial pulses are intact and equal bilaterally.  Extremities: no clubbing, cyanosis, or edema.  Patient was seen for 25 minutes face to face of which, 20 minutes was spent counseling regarding discussion of her fatigue and the work up we have already done.      Please note that this dictation was created using voice recognition software. I have made every reasonable attempt to " correct obvious errors, but I expect that there are errors of grammar and possibly content that I did not discover before finalizing the note.    Assessment/Plan:  1. Essential hypertension  Controlled, continue with current meds and lifestyle.      2. Uncontrolled daytime somnolence  Uncontrolled, pt needs to have a sleep evaluation before we consider medication such as Provigil  - REFERRAL TO PULMONARY AND SLEEP MEDICINE Sleep Medicine    3. Mixed hyperlipidemia  Controlled, continue with current meds and lifestyle.  We need to recheck this patient's blood work in about 4 months and see her back.  - Comp Metabolic Panel; Future  - Lipid Profile; Future

## 2020-12-11 ENCOUNTER — OFFICE VISIT (OUTPATIENT)
Dept: MEDICAL GROUP | Facility: PHYSICIAN GROUP | Age: 70
End: 2020-12-11
Payer: MEDICARE

## 2020-12-11 VITALS
WEIGHT: 179.6 LBS | HEIGHT: 65 IN | DIASTOLIC BLOOD PRESSURE: 76 MMHG | RESPIRATION RATE: 20 BRPM | TEMPERATURE: 97.8 F | HEART RATE: 91 BPM | BODY MASS INDEX: 29.92 KG/M2 | OXYGEN SATURATION: 94 % | SYSTOLIC BLOOD PRESSURE: 120 MMHG

## 2020-12-11 DIAGNOSIS — E06.3 HASHIMOTO'S THYROIDITIS: ICD-10-CM

## 2020-12-11 DIAGNOSIS — I10 ESSENTIAL HYPERTENSION: ICD-10-CM

## 2020-12-11 DIAGNOSIS — E78.2 MIXED HYPERLIPIDEMIA: ICD-10-CM

## 2020-12-11 DIAGNOSIS — N18.31 STAGE 3A CHRONIC KIDNEY DISEASE: ICD-10-CM

## 2020-12-11 PROCEDURE — 99214 OFFICE O/P EST MOD 30 MIN: CPT | Performed by: FAMILY MEDICINE

## 2020-12-11 ASSESSMENT — FIBROSIS 4 INDEX: FIB4 SCORE: 1.15

## 2020-12-11 NOTE — ASSESSMENT & PLAN NOTE
This is a chronic health problem that is uncontrolled with current medications and lifestyle measures.  Patient is complaining that her cold intolerance has gotten worse that she is having fatigue and she believes that her Hashimoto's may be out of control.  If her thyroid tests are normal she would like to see a nutritionist so she can work on weight loss going forward.

## 2020-12-11 NOTE — ASSESSMENT & PLAN NOTE
This is a chronic health problem that is well controlled with current medications and lifestyle measures. Her last lipid panel was done over the summer.  She will get a new panel prior to meeting her new primary care provider.

## 2020-12-11 NOTE — PROGRESS NOTES
CC:Diagnoses of Essential hypertension, Hashimoto's thyroiditis, Stage 3a chronic kidney disease, and Mixed hyperlipidemia were pertinent to this visit.    HISTORY OF PRESENT ILLNESS: Patient is a 70 y.o. female established patient who presents today to talk about her chronic health problems and to discuss her care going forward.  Patient would like to have a [facility]own physician in the [facility] or [facility].      Essential hypertension  This is a chronic health problem that is well controlled with current medications and lifestyle measures. Her BP is excellent at 120/76.    Hashimoto's thyroiditis  This is a chronic health problem that is uncontrolled with current medications and lifestyle measures.  Patient is complaining that her cold intolerance has gotten worse that she is having fatigue and she believes that her Hashimoto's may be out of control.  If her thyroid tests are normal she would like to see a nutritionist so she can work on weight loss going forward.    CKD (chronic kidney disease) stage 3, GFR 30-59 ml/min ([facility])  This is a chronic health problem that is well controlled with current medications and lifestyle measures.  GFR 54 over the summer.    Mixed hyperlipidemia  This is a chronic health problem that is well controlled with current medications and lifestyle measures. Her last lipid panel was done over the summer.  She will get a new panel prior to meeting her new primary care provider.      Patient Active Problem List    Diagnosis Date Noted   • CKD (chronic kidney disease) stage 3, GFR 30-59 ml/min (HCC) 08/16/2017     Priority: Low   • History of breast cancer 01/12/2016     Priority: Low   • Mixed hyperlipidemia 01/12/2016     Priority: Low   • Uncontrolled daytime somnolence 08/26/2020   • Hashimoto's thyroiditis 07/22/2020   • Malignant neoplasm of upper-outer quadrant of right breast in female, estrogen receptor positive (HCC) 07/10/2020   • Unexplained night sweats 07/10/2020   •  Overweight (BMI 25.0-29.9) 2017   • Essential hypertension 2016   • Glaucoma 2016   • Osteopenia 2016      Allergies:Patient has no known allergies.    Current Outpatient Medications   Medication Sig Dispense Refill   • pravastatin (PRAVACHOL) 20 MG Tab TAKE ONE TABLET BY MOUTH AT BEDTIME  100 Tab 3   • benazepril (LOTENSIN) 10 MG Tab Take 1 Tab by mouth every day. 90 Tab 3   • vitamin D (CHOLECALCIFEROL) 1000 Unit (25 mcg) Tab Take 1,000 Units by mouth every day.     • letrozole (FEMARA) 2.5 MG Tab Take 2.5 mg by mouth every day. Indications: Cancer of the Breast     • calcium carbonate (OS-OMERO 500) 1250 MG Tab Take 1,250 mg by mouth 2 times a day, with meals.       No current facility-administered medications for this visit.        Social History     Tobacco Use   • Smoking status: Former Smoker     Packs/day: 0.25     Years: 10.00     Pack years: 2.50     Types: Cigarettes     Quit date: 2000     Years since quittin.9   • Smokeless tobacco: Never Used   Substance Use Topics   • Alcohol use: Yes     Frequency: 2-3 times a week     Drinks per session: 1 or 2     Comment: glass of wine    • Drug use: No     Social History     Social History Narrative   • Not on file       Family History   Problem Relation Age of Onset   • Heart Disease Father    • Cancer Sister         breast with mets   • Hypertension Mother    • Lung Disease Mother         smoker   • Arthritis Mother         RA   • Stroke Mother    • Diabetes Maternal Uncle         type I   • Cancer Maternal Grandmother         Colon   • Diabetes Maternal Grandfather         type II   • Arthritis Daughter    • No Known Problems Daughter    • No Known Problems Paternal Grandmother    • No Known Problems Paternal Grandfather         ROS:     - Constitutional:  Negative for fever, chills, unexpected weight change, and fatigue/generalized weakness.    - HEENT:  Negative for headaches, vision changes, hearing changes, ear pain, ear  "discharge, rhinorrhea, sinus congestion, sore throat, and neck pain.      - Respiratory: Negative for cough, sputum production, chest congestion, dyspnea, wheezing, and crackles.      - Cardiovascular: Negative for chest pain, palpitations, orthopnea, and bilateral lower extremity edema.     - Gastrointestinal: Negative for heartburn, nausea, vomiting, abdominal pain, hematochezia, melena, diarrhea, constipation, and greasy/foul-smelling stools.     - Genitourinary: Negative for dysuria, polyuria, hematuria, pyuria, urinary urgency, and urinary incontinence.     - Musculoskeletal: Negative for myalgias, back pain, and joint pain.     - Skin: Negative for rash, itching, cyanotic skin color change.     - Neurological: Negative for dizziness, tingling, tremors, focal sensory deficit, focal weakness and headaches.     - Endo/Heme/Allergies: Does not bruise/bleed easily.     - Psychiatric/Behavioral: Negative for depression, suicidal/homicidal ideation and memory loss.          - NOTE: All other systems reviewed and are negative, except as in HPI.      Exam:    /76 (BP Location: Left arm, Patient Position: Sitting, BP Cuff Size: Adult)   Pulse 91   Temp 36.6 °C (97.8 °F) (Temporal)   Resp 20   Ht 1.651 m (5' 5\")   Wt 81.5 kg (179 lb 9.6 oz)   SpO2 94%  Body mass index is 29.89 kg/m².    General:  Well nourished, well developed female in NAD  Head is grossly normal.  Neck: Supple without JVD or bruit. Thyroid is not enlarged.  Pulmonary: Clear to ausculation and percussion.  Normal effort. No rales, ronchi, or wheezing.  Cardiovascular: Regular rate and rhythm without murmur. Carotid and radial pulses are intact and equal bilaterally.  Extremities: no clubbing, cyanosis, or edema.    Please note that this dictation was created using voice recognition software. I have made every reasonable attempt to correct obvious errors, but I expect that there are errors of grammar and possibly content that I did not " discover before finalizing the note.    Assessment/Plan:  1. Essential hypertension  Controlled, continue with current meds and lifestyle.      2. Hashimoto's thyroiditis  Uncertain control, patient is having increasing hypothyroid type symptomatology.  We will recheck labs  - THYROID PEROXIDASE  (TPO) AB; Future  - TSH WITH REFLEX TO FT4; Future    3. Stage 3a chronic kidney disease  Now improved    4. Mixed hyperlipidemia  Controlled, continue with current meds and lifestyle.    - Comp Metabolic Panel; Future  - Lipid Profile; Future

## 2020-12-11 NOTE — ASSESSMENT & PLAN NOTE
This is a chronic health problem that is well controlled with current medications and lifestyle measures.  GFR 54 over the summer.

## 2020-12-11 NOTE — ASSESSMENT & PLAN NOTE
This is a chronic health problem that is well controlled with current medications and lifestyle measures. Her BP is excellent at 120/76.

## 2020-12-14 ENCOUNTER — PATIENT MESSAGE (OUTPATIENT)
Dept: MEDICAL GROUP | Facility: PHYSICIAN GROUP | Age: 70
End: 2020-12-14

## 2021-01-11 ENCOUNTER — HOSPITAL ENCOUNTER (OUTPATIENT)
Dept: LAB | Facility: MEDICAL CENTER | Age: 71
End: 2021-01-11
Attending: FAMILY MEDICINE
Payer: MEDICARE

## 2021-01-11 DIAGNOSIS — E06.3 HASHIMOTO'S THYROIDITIS: ICD-10-CM

## 2021-01-11 DIAGNOSIS — E78.2 MIXED HYPERLIPIDEMIA: ICD-10-CM

## 2021-01-11 LAB
ALBUMIN SERPL BCP-MCNC: 4.6 G/DL (ref 3.2–4.9)
ALBUMIN/GLOB SERPL: 1.7 G/DL
ALP SERPL-CCNC: 62 U/L (ref 30–99)
ALT SERPL-CCNC: 35 U/L (ref 2–50)
ANION GAP SERPL CALC-SCNC: 10 MMOL/L (ref 7–16)
AST SERPL-CCNC: 25 U/L (ref 12–45)
BILIRUB SERPL-MCNC: 0.5 MG/DL (ref 0.1–1.5)
BUN SERPL-MCNC: 14 MG/DL (ref 8–22)
CALCIUM SERPL-MCNC: 10.3 MG/DL (ref 8.5–10.5)
CHLORIDE SERPL-SCNC: 103 MMOL/L (ref 96–112)
CHOLEST SERPL-MCNC: 166 MG/DL (ref 100–199)
CO2 SERPL-SCNC: 26 MMOL/L (ref 20–33)
CREAT SERPL-MCNC: 1.2 MG/DL (ref 0.5–1.4)
FASTING STATUS PATIENT QL REPORTED: NORMAL
GLOBULIN SER CALC-MCNC: 2.7 G/DL (ref 1.9–3.5)
GLUCOSE SERPL-MCNC: 93 MG/DL (ref 65–99)
HDLC SERPL-MCNC: 55 MG/DL
LDLC SERPL CALC-MCNC: 84 MG/DL
POTASSIUM SERPL-SCNC: 4 MMOL/L (ref 3.6–5.5)
PROT SERPL-MCNC: 7.3 G/DL (ref 6–8.2)
SODIUM SERPL-SCNC: 139 MMOL/L (ref 135–145)
THYROPEROXIDASE AB SERPL-ACNC: 97 IU/ML (ref 0–9)
TRIGL SERPL-MCNC: 135 MG/DL (ref 0–149)
TSH SERPL DL<=0.005 MIU/L-ACNC: 0.61 UIU/ML (ref 0.38–5.33)

## 2021-01-11 PROCEDURE — 80053 COMPREHEN METABOLIC PANEL: CPT

## 2021-01-11 PROCEDURE — 36415 COLL VENOUS BLD VENIPUNCTURE: CPT

## 2021-01-11 PROCEDURE — 86376 MICROSOMAL ANTIBODY EACH: CPT

## 2021-01-11 PROCEDURE — 84443 ASSAY THYROID STIM HORMONE: CPT

## 2021-01-11 PROCEDURE — 80061 LIPID PANEL: CPT

## 2021-01-11 SDOH — ECONOMIC STABILITY: TRANSPORTATION INSECURITY
IN THE PAST 12 MONTHS, HAS LACK OF RELIABLE TRANSPORTATION KEPT YOU FROM MEDICAL APPOINTMENTS, MEETINGS, WORK OR FROM GETTING THINGS NEEDED FOR DAILY LIVING?: NO

## 2021-01-11 SDOH — ECONOMIC STABILITY: TRANSPORTATION INSECURITY
IN THE PAST 12 MONTHS, HAS THE LACK OF TRANSPORTATION KEPT YOU FROM MEDICAL APPOINTMENTS OR FROM GETTING MEDICATIONS?: NO

## 2021-01-11 SDOH — ECONOMIC STABILITY: HOUSING INSECURITY
IN THE LAST 12 MONTHS, WAS THERE A TIME WHEN YOU DID NOT HAVE A STEADY PLACE TO SLEEP OR SLEPT IN A SHELTER (INCLUDING NOW)?: NO

## 2021-01-11 SDOH — HEALTH STABILITY: PHYSICAL HEALTH: ON AVERAGE, HOW MANY MINUTES DO YOU ENGAGE IN EXERCISE AT THIS LEVEL?: 20 MINUTES

## 2021-01-11 SDOH — ECONOMIC STABILITY: INCOME INSECURITY: IN THE LAST 12 MONTHS, WAS THERE A TIME WHEN YOU WERE NOT ABLE TO PAY THE MORTGAGE OR RENT ON TIME?: NO

## 2021-01-11 SDOH — ECONOMIC STABILITY: HOUSING INSECURITY: IN THE LAST 12 MONTHS, HOW MANY PLACES HAVE YOU LIVED?: 1

## 2021-01-11 SDOH — HEALTH STABILITY: MENTAL HEALTH
STRESS IS WHEN SOMEONE FEELS TENSE, NERVOUS, ANXIOUS, OR CAN'T SLEEP AT NIGHT BECAUSE THEIR MIND IS TROUBLED. HOW STRESSED ARE YOU?: NOT AT ALL

## 2021-01-11 SDOH — HEALTH STABILITY: PHYSICAL HEALTH: ON AVERAGE, HOW MANY DAYS PER WEEK DO YOU ENGAGE IN MODERATE TO STRENUOUS EXERCISE (LIKE A BRISK WALK)?: 1 DAY

## 2021-01-11 ASSESSMENT — SOCIAL DETERMINANTS OF HEALTH (SDOH)
HOW OFTEN DO YOU HAVE SIX OR MORE DRINKS ON ONE OCCASION: NEVER
HOW OFTEN DO YOU HAVE A DRINK CONTAINING ALCOHOL: 4 OR MORE TIMES A WEEK
IN A TYPICAL WEEK, HOW MANY TIMES DO YOU TALK ON THE PHONE WITH FAMILY, FRIENDS, OR NEIGHBORS?: THREE TIMES A WEEK
HOW OFTEN DO YOU ATTENT MEETINGS OF THE CLUB OR ORGANIZATION YOU BELONG TO?: NEVER
WITHIN THE PAST 12 MONTHS, YOU WORRIED THAT YOUR FOOD WOULD RUN OUT BEFORE YOU GOT THE MONEY TO BUY MORE: NEVER TRUE
HOW MANY DRINKS CONTAINING ALCOHOL DO YOU HAVE ON A TYPICAL DAY WHEN YOU ARE DRINKING: 1 OR 2
WITHIN THE PAST 12 MONTHS, THE FOOD YOU BOUGHT JUST DIDN'T LAST AND YOU DIDN'T HAVE MONEY TO GET MORE: NEVER TRUE
DO YOU BELONG TO ANY CLUBS OR ORGANIZATIONS SUCH AS CHURCH GROUPS UNIONS, FRATERNAL OR ATHLETIC GROUPS, OR SCHOOL GROUPS?: NO
HOW OFTEN DO YOU GET TOGETHER WITH FRIENDS OR RELATIVES?: DECLINE
HOW HARD IS IT FOR YOU TO PAY FOR THE VERY BASICS LIKE FOOD, HOUSING, MEDICAL CARE, AND HEATING?: NOT HARD AT ALL
HOW OFTEN DO YOU ATTEND CHURCH OR RELIGIOUS SERVICES?: MORE THAN 4 TIMES PER YEAR

## 2021-01-13 ENCOUNTER — SLEEP CENTER VISIT (OUTPATIENT)
Dept: SLEEP MEDICINE | Facility: MEDICAL CENTER | Age: 71
End: 2021-01-13
Payer: MEDICARE

## 2021-01-13 VITALS
HEIGHT: 65 IN | HEART RATE: 80 BPM | RESPIRATION RATE: 20 BRPM | OXYGEN SATURATION: 99 % | BODY MASS INDEX: 28.66 KG/M2 | DIASTOLIC BLOOD PRESSURE: 68 MMHG | SYSTOLIC BLOOD PRESSURE: 102 MMHG | WEIGHT: 172 LBS

## 2021-01-13 DIAGNOSIS — G47.33 OSA (OBSTRUCTIVE SLEEP APNEA): ICD-10-CM

## 2021-01-13 DIAGNOSIS — E06.3 HASHIMOTO'S THYROIDITIS: ICD-10-CM

## 2021-01-13 DIAGNOSIS — I10 ESSENTIAL HYPERTENSION: ICD-10-CM

## 2021-01-13 PROCEDURE — 99203 OFFICE O/P NEW LOW 30 MIN: CPT | Performed by: INTERNAL MEDICINE

## 2021-01-13 RX ORDER — VALACYCLOVIR HYDROCHLORIDE 1 G/1
TABLET, FILM COATED ORAL
COMMUNITY
Start: 2020-12-16 | End: 2021-01-15

## 2021-01-13 ASSESSMENT — FIBROSIS 4 INDEX: FIB4 SCORE: 1.06

## 2021-01-13 NOTE — PROGRESS NOTES
"Chief Complaint   Patient presents with   • Establish Care     Referred by Dr. Mai Goyal for Uncontrolled daytime somnolence   • Other     No prior sleep study       HPI: This patient is a 70 y.o. female who is referred for sleep apnea evaluation.  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  Her principal complaint is that she is a \"mouth breather\" which is now affecting her dentition.  She is interested in seeing ENT to address any potential nasal congestion. She is unaware of snoring however has experienced nighttime resuscitative snorts.  Her , who uses CPAP, sleeps in a separate bedroom.  She describes interrupted sleep with night sweats and awakens unrested.  She has been diagnosed with Hashimoto's thyroiditis and feels chronically fatigued.  Weight has been gradually increasing over recent years, with BMI:28.      Past Medical History:   Diagnosis Date   • Cancer (HCC)     Breast; dx 2013   • Cataract    • CKD (chronic kidney disease) stage 3, GFR 30-59 ml/min 8/16/2017   • Gallstones    • Glaucoma     right eye   • Hashimoto's thyroiditis 7/22/2020   • Hyperlipidemia    • Hyperlipidemia LDL goal <100 1/12/2016   • Hypertension    • Malignant neoplasm of upper-outer quadrant of right breast in female, estrogen receptor positive (HCC) 7/10/2020    Complete mastectomy in 2013 with Dr. Garg.  Had Dr. Jay who recommended Femara for total of 10 yeas.   • Uncontrolled daytime somnolence 8/26/2020   • Unexplained night sweats 7/10/2020       Social History     Socioeconomic History   • Marital status:      Spouse name: Not on file   • Number of children: Not on file   • Years of education: Not on file   • Highest education level: Associate degree: occupational, technical, or vocational program   Occupational History     Comment: Retired LPN   Social Needs   • Financial resource strain: Not hard at all   • Food " insecurity     Worry: Never true     Inability: Never true   • Transportation needs     Medical: No     Non-medical: No   Tobacco Use   • Smoking status: Former Smoker     Packs/day: 0.25     Years: 10.00     Pack years: 2.50     Types: Cigarettes     Quit date: 2000     Years since quittin.0   • Smokeless tobacco: Never Used   Substance and Sexual Activity   • Alcohol use: Yes     Frequency: 4 or more times a week     Drinks per session: 1 or 2     Binge frequency: Never     Comment: glass of wine    • Drug use: No   • Sexual activity: Not Currently     Partners: 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: ; retired LPN   Lifestyle   • Physical activity     Days per week: 1 day     Minutes per session: 20 min   • Stress: Not at all   Relationships   • Social connections     Talks on phone: Three times a week     Gets together: Patient refused     Attends Denominational service: More than 4 times per year     Active member of club or organization: No     Attends meetings of clubs or organizations: Never     Relationship status:    • Intimate partner violence     Fear of current or ex partner: Not on file     Emotionally abused: Not on file     Physically abused: Not on file     Forced sexual activity: Not on file   Other Topics Concern   • Not on file   Social History Narrative   • Not on file       Family History   Problem Relation Age of Onset   • Heart Disease Father    • Cancer Sister         breast with mets   • Hypertension Mother    • Lung Disease Mother         smoker   • Arthritis Mother         RA   • Stroke Mother    • Diabetes Maternal Uncle         type I   • Cancer Maternal Grandmother         Colon   • Diabetes Maternal Grandfather         type II   • Arthritis Daughter    • No Known Problems Daughter    • No Known Problems Paternal Grandmother    • No Known Problems Paternal Grandfather        Current Outpatient Medications on File Prior to Visit   Medication Sig Dispense Refill   • valacyclovir (VALTREX) 1  "GM Tab      • pravastatin (PRAVACHOL) 20 MG Tab TAKE ONE TABLET BY MOUTH AT BEDTIME  100 Tab 3   • benazepril (LOTENSIN) 10 MG Tab Take 1 Tab by mouth every day. 90 Tab 3   • vitamin D (CHOLECALCIFEROL) 1000 Unit (25 mcg) Tab Take 1,000 Units by mouth every day.     • letrozole (FEMARA) 2.5 MG Tab Take 2.5 mg by mouth every day. Indications: Cancer of the Breast     • calcium carbonate (OS-OMERO 500) 1250 MG Tab Take 1,250 mg by mouth 2 times a day, with meals.       No current facility-administered medications on file prior to visit.        Allergies: Patient has no known allergies.    ROS:   Constitutional: Denies fevers, chills, night sweats, fatigue or weight loss  Eyes: Denies vision loss, pain, drainage, double vision  Ears, Nose, Throat: Denies earache, difficulty hearing, tinnitus, nasal congestion, hoarseness  Cardiovascular: Denies chest pain, tightness, palpitations, orthopnea or edema  Respiratory: Denies shortness of breath, cough, wheezing, hemoptysis  Sleep:As in HPI  GI: Denies heartburn, dysphagia, nausea, abdominal pain, diarrhea or constipation  : Denies frequent urination, hematuria, discharge or painful urination  Musculoskeletal: Denies back pain, painful joints, sore muscles  Neurological: Denies weakness or headaches  Skin: +shingles    /68 (BP Location: Left arm, Patient Position: Sitting, BP Cuff Size: Adult)   Pulse 80   Resp 20   Ht 1.651 m (5' 5\")   Wt 78 kg (172 lb)   SpO2 99%     Physical Exam:  Appearance: Well-nourished, well-developed, in no acute distress  HEENT: Normocephalic, atraumatic, white sclera, PERRLA, Mallampati 3  Neck: No adenopathy or masses  Respiratory: no intercostal retractions or accessory muscle use  Lungs auscultation: Clear to auscultation bilaterally  Cardiovascular: Regular rate rhythm. No murmurs, rubs or gallops.  No LE edema  Abdomen: soft, nondistended  Gait: Normal  Digits: No clubbing, cyanosis  Motor: No focal deficits  Orientation: Oriented " to time, person and place    Diagnosis:  1. MICHAEL (obstructive sleep apnea)  Overnight Home Sleep Study    -presumptive   2. BMI 28.0-28.9,adult     3. Essential hypertension     4. Hashimoto's thyroiditis         Plan:  The patient has disrupted and nonrestorative sleep in the setting of a crowded airway and elevated BMI, with high clinical suspicion for obstructive sleep apnea.  She is familiar with MICHAEL as her  uses CPAP.  Recommend polysomnography for evaluation of suspected obstructive sleep apnea-we discussed her mouth breathing is likely on account of MICHAEL and optimal treatment will be discussed upon review of her sleep study.  These treatments may include CPAP, ENT referral, oral appliance therapy.   Use Biotene for dry mouth in interim.    Encouraged follow-up with PCP for management of Hashimoto's thyroiditis.  Return for after sleep study.

## 2021-01-15 ENCOUNTER — HOSPITAL ENCOUNTER (OUTPATIENT)
Dept: LAB | Facility: MEDICAL CENTER | Age: 71
End: 2021-01-15
Attending: FAMILY MEDICINE
Payer: MEDICARE

## 2021-01-15 ENCOUNTER — OFFICE VISIT (OUTPATIENT)
Dept: MEDICAL GROUP | Facility: LAB | Age: 71
End: 2021-01-15
Payer: MEDICARE

## 2021-01-15 VITALS
HEART RATE: 85 BPM | DIASTOLIC BLOOD PRESSURE: 72 MMHG | BODY MASS INDEX: 29.16 KG/M2 | OXYGEN SATURATION: 94 % | WEIGHT: 175.04 LBS | HEIGHT: 65 IN | SYSTOLIC BLOOD PRESSURE: 128 MMHG | TEMPERATURE: 97.1 F

## 2021-01-15 DIAGNOSIS — I10 ESSENTIAL HYPERTENSION: ICD-10-CM

## 2021-01-15 DIAGNOSIS — E78.5 HYPERLIPIDEMIA, UNSPECIFIED HYPERLIPIDEMIA TYPE: ICD-10-CM

## 2021-01-15 DIAGNOSIS — E06.3 HASHIMOTO'S THYROIDITIS: ICD-10-CM

## 2021-01-15 DIAGNOSIS — Z17.0 MALIGNANT NEOPLASM OF UPPER-OUTER QUADRANT OF RIGHT BREAST IN FEMALE, ESTROGEN RECEPTOR POSITIVE (HCC): ICD-10-CM

## 2021-01-15 DIAGNOSIS — C50.411 MALIGNANT NEOPLASM OF UPPER-OUTER QUADRANT OF RIGHT BREAST IN FEMALE, ESTROGEN RECEPTOR POSITIVE (HCC): ICD-10-CM

## 2021-01-15 DIAGNOSIS — Z76.89 ENCOUNTER TO ESTABLISH CARE: ICD-10-CM

## 2021-01-15 DIAGNOSIS — R94.4 DECREASED GFR: ICD-10-CM

## 2021-01-15 DIAGNOSIS — G31.9 DEGENERATIVE DISEASE OF NERVOUS SYSTEM, UNSPECIFIED (HCC): ICD-10-CM

## 2021-01-15 PROCEDURE — 86800 THYROGLOBULIN ANTIBODY: CPT

## 2021-01-15 PROCEDURE — 99214 OFFICE O/P EST MOD 30 MIN: CPT | Performed by: FAMILY MEDICINE

## 2021-01-15 PROCEDURE — 36415 COLL VENOUS BLD VENIPUNCTURE: CPT

## 2021-01-15 RX ORDER — AMLODIPINE BESYLATE 10 MG/1
10 TABLET ORAL DAILY
Qty: 90 TAB | Refills: 3 | Status: SHIPPED
Start: 2021-01-15 | End: 2021-06-30

## 2021-01-15 RX ORDER — AMLODIPINE BESYLATE 10 MG/1
10 TABLET ORAL DAILY
Qty: 90 TAB | Refills: 3 | Status: SHIPPED
Start: 2021-01-15 | End: 2021-01-15 | Stop reason: SDUPTHER

## 2021-01-15 ASSESSMENT — FIBROSIS 4 INDEX: FIB4 SCORE: 1.06

## 2021-01-15 ASSESSMENT — PATIENT HEALTH QUESTIONNAIRE - PHQ9: CLINICAL INTERPRETATION OF PHQ2 SCORE: 0

## 2021-01-15 NOTE — PROGRESS NOTES
Annual Health Assessment Questions:    1.  Are you currently engaging in any exercise or physical activity? No    2.  How would you describe your mood or emotional well-being today? fair    3.  Have you had any falls in the last year? No    4.  Have you noticed any problems with your balance or had difficulty walking? No    5.  In the last six months have you experienced any leakage of urine? No    6. DPA/Advanced Directive: Patient has Advanced Directive, but it is not on file. Instructed to bring in a copy to scan into their chart.

## 2021-01-16 NOTE — PROGRESS NOTES
CC: Here to establish care, patient has multiple concerns discussed in HPI    HPI: Established patient, new to me  Lauren presents today here to establish care, 70 years old female with past medical history significant for history of breast cancer, Hashimoto thyroiditis, overweight, decreased GFR, as part of her establishing care visit today reviewed medical records, discussed the following as follow:  1. Essential hypertension  Blood pressure at the office 128/72, patient is concerned because she was recently started on ACE inhibitor by PCP, she was concerned about her kidney function.  Most recent kidney function rechecked shows deterioration of her GFR, she is concerned about it.  BUN and creatinine within normal limits.  Denies headache or chest pain or shortness of breath, patient was on low-dose Benzapril 10 mg daily.  In the past she was on a combination of amlodipine and Benzapril and amlodipine was discontinued because she started to have low blood pressure and dizziness    2. Hyperlipidemia, unspecified hyperlipidemia type  Lipid profile reviewed with the patient within normal limits, on pravastatin 20 mg daily denies side effects    3. Decreased GFR  Noted decreased GFR, between 44-54.  Previous GFR between 54-60, patient recently has been using Benzapril 10 mg only.  Concerned about the medication and would like it to be changed    4. Encounter to establish care  Reviewed past medical problems, past surgical history, family/social history and medications, reviewed records.    5. Hashimoto's thyroiditis  History of Hashimoto thyroiditis, with normal thyroid function test, reports a lot of tiredness fatigue inability to lose weight, thyroid function within normal limits.    6. Degenerative disease of nervous system, unspecified (HCC)  This is an incidental finding in her brain MRI, patient denies memory changes or signs of dementia.    7. Malignant neoplasm of upper-outer quadrant of right breast in female,  estrogen receptor positive (HCC)  Diagnosed with breast cancer about 9 years ago, she has been cancer free and following up with her oncologist on regular basis.  Patient on maintenance medication letrozole for prevention.      Patient Active Problem List    Diagnosis Date Noted   • CKD (chronic kidney disease) stage 3, GFR 30-59 ml/min (HCC) 08/16/2017     Priority: Low   • History of breast cancer 01/12/2016     Priority: Low   • Mixed hyperlipidemia 01/12/2016     Priority: Low   • Uncontrolled daytime somnolence 08/26/2020   • Hashimoto's thyroiditis 07/22/2020   • Malignant neoplasm of upper-outer quadrant of right breast in female, estrogen receptor positive (HCC) 07/10/2020   • Unexplained night sweats 07/10/2020   • Overweight (BMI 25.0-29.9) 08/23/2017   • Essential hypertension 01/12/2016   • Glaucoma 01/12/2016   • Osteopenia 01/12/2016       Current Outpatient Medications   Medication Sig Dispense Refill   • amLODIPine (NORVASC) 10 MG Tab Take 1 Tab by mouth every day. 90 Tab 3   • pravastatin (PRAVACHOL) 20 MG Tab TAKE ONE TABLET BY MOUTH AT BEDTIME  100 Tab 3   • vitamin D (CHOLECALCIFEROL) 1000 Unit (25 mcg) Tab Take 1,000 Units by mouth every day.     • letrozole (FEMARA) 2.5 MG Tab Take 2.5 mg by mouth every day. Indications: Cancer of the Breast     • calcium carbonate (OS-OMERO 500) 1250 MG Tab Take 1,250 mg by mouth 2 times a day, with meals.       No current facility-administered medications for this visit.          Allergies as of 01/15/2021   • (No Known Allergies)        ROS: Denies any chest pain, Shortness of breath, Changes bowel or bladder, Lower extremity edema.    Physical Exam:  Gen.: Well-developed, well-nourished, no apparent distress,pleasant and cooperative with the examination  Skin:  Warm and dry with good turgor. No rashes or suspicious lesions in visible areas  Eye: PERRLA, conjunctiva and sclera clear, lids normal  HEENT: Normocephalic/atraumatic, sinuses nontender with  palpation, TMs clear, nares patent with pink mucosa and clear rhinorrhea, lips without lesions, oropharynx clear.  Neck: Trachea midline,no masses or adenopathy  Thyroid: normal consistency and size. No masses or nodules. Not tender with palpation.  Cor: Regular rate and rhythm without murmur, gallop or rub.  Lungs: Respirations unlabored.Clear to auscultation with equal breath sounds bilaterally. No wheezes, rhonchi.  Abdomen: Soft nontender without hepatosplenomegaly or masses appreciated, normoactive bowel sounds. No hernias.  Extremities: No cyanosis, clubbing or edema, Symmetrical without deformities or malformations. Pulses 2+ and symmetrical both upper and lower extremities  Lymphatic: No abnormal adenopathy of the neck groin or axillae.  Psych: Alert and oriented x 3.Normal affect, judgement,insight and memory.        Assessment and Plan.   70 y.o. female here to establish care    1. Essential hypertension  Chronic, controlled.  Will stop Benzapril since the patient is not comfortable with a reduced GFR, restart amlodipine 10 mg daily we will continue to monitor her blood pressure.  - amLODIPine (NORVASC) 10 MG Tab; Take 1 Tab by mouth every day.  Dispense: 90 Tab; Refill: 3    2. Hyperlipidemia, unspecified hyperlipidemia type  Chronic well-controlled continue pravastatin 20 mg daily    3. Decreased GFR  Chronic, advised to increase hydration, avoid all NSAIDs, change Benzapril to amlodipine for blood pressure control, will monitor GFR and repeat in couple of months    4. Encounter to establish care  Reviewed medical history    5. Hashimoto's thyroiditis  Normal thyroid function, check antithyroglobulin antibodies and ultrasound of the thyroid to rule out nodules  - ANTITHYROGLOBULIN AB; Future  - US-THYROID; Future    6. Degenerative disease of nervous system, unspecified (HCC)  Incidental finding, asymptomatic no signs or symptoms of dementia    7. Malignant neoplasm of upper-outer quadrant of right  breast in female, estrogen receptor positive (HCC)  Patient is cancer free at this time, continue surveillance per oncology.  No concerns       Please note that this dictation was created using voice recognition software. I have made every reasonable attempt to correct obvious errors but there may be errors of grammar and content that I may have overlooked prior to finalization of this note.

## 2021-01-17 LAB — THYROGLOB AB SERPL-ACNC: 1104.1 IU/ML (ref 0–4)

## 2021-01-20 ENCOUNTER — APPOINTMENT (OUTPATIENT)
Dept: SLEEP MEDICINE | Facility: MEDICAL CENTER | Age: 71
End: 2021-01-20
Attending: INTERNAL MEDICINE
Payer: MEDICARE

## 2021-01-20 DIAGNOSIS — G47.33 OSA (OBSTRUCTIVE SLEEP APNEA): ICD-10-CM

## 2021-01-25 ENCOUNTER — HOME STUDY (OUTPATIENT)
Dept: SLEEP MEDICINE | Facility: MEDICAL CENTER | Age: 71
End: 2021-01-25
Payer: MEDICARE

## 2021-01-25 PROCEDURE — G0399 HOME SLEEP TEST/TYPE 3 PORTA: HCPCS | Performed by: INTERNAL MEDICINE

## 2021-01-26 ENCOUNTER — OFFICE VISIT (OUTPATIENT)
Dept: SLEEP MEDICINE | Facility: MEDICAL CENTER | Age: 71
End: 2021-01-26
Payer: MEDICARE

## 2021-01-26 VITALS
BODY MASS INDEX: 29.16 KG/M2 | OXYGEN SATURATION: 94 % | HEART RATE: 76 BPM | WEIGHT: 175 LBS | DIASTOLIC BLOOD PRESSURE: 70 MMHG | SYSTOLIC BLOOD PRESSURE: 126 MMHG | RESPIRATION RATE: 14 BRPM | HEIGHT: 65 IN

## 2021-01-26 DIAGNOSIS — G47.33 OSA (OBSTRUCTIVE SLEEP APNEA): ICD-10-CM

## 2021-01-26 DIAGNOSIS — H92.03 CHRONIC EAR PAIN, BILATERAL: ICD-10-CM

## 2021-01-26 DIAGNOSIS — I10 ESSENTIAL HYPERTENSION: ICD-10-CM

## 2021-01-26 DIAGNOSIS — G89.29 CHRONIC EAR PAIN, BILATERAL: ICD-10-CM

## 2021-01-26 DIAGNOSIS — R09.81 CHRONIC NASAL CONGESTION: ICD-10-CM

## 2021-01-26 PROCEDURE — 99213 OFFICE O/P EST LOW 20 MIN: CPT | Performed by: NURSE PRACTITIONER

## 2021-01-26 ASSESSMENT — FIBROSIS 4 INDEX: FIB4 SCORE: 1.06

## 2021-01-26 NOTE — PROGRESS NOTES
"Chief Complaint   Patient presents with   • Follow-Up     MICHAEL-Last seen 2021   • Results     HST_ 2021       HPI:      Mrs. Jiang is a 71 y/o female patient who is in today for MICHAEL f/u and sleep study results. She is a retired RN. PMH includes hypertension, glaucoma, osteopenia, overweight, right breast cancer, Hashimoto's thyroiditis, CKD stage III, mixed hyperlipidemia, former smoker, 0.25 packs a day for 10 years quit in 2000, T&A.     HSS from 21 indicated mild sleep apnea - MONTSE 8.9; a home sleep study may underestimate the severity of sleep apnea as the total sleep time is unknown and monitoring time is used instead. Persistent nocturnal desaturation - bar saturation 83% - less than 90% for 38.4% of the TIB. Significant snorin total snoring episodes/percentage of snoring 13.7%.     Patient is not open to CPAP therapy and would prefer to focus on weight loss to help manage apnea.  She states that she has recently lost about 5 pounds and she is eating a more plant-based diet and is more active.  She averages about 6-8 hrs of sleep. She reports chronic headaches and snoring. She is a mouth breather due to not being able to breath through her nose, which is now affecting her dentition.  She also has chronic bilateral ear pain.  She saw an ENT specialist in [place] but was not impressed as they were not able to provide her with a resolution to her problem.  She would still like to follow-up with an ENT for a second opinion.she has tried Afrin spray which was not effective in left \"dried blood\" in her nose.  She describes interrupted sleep with night sweats and awakens unrested.  She has been diagnosed with Hashimoto's thyroiditis and feels chronically fatigued.  She is following up with a new PCP next month.  She also had shingles over Jacob for which she has recovered.      ROS:    Constitutional: Denies fevers, Denies weight changes  Eyes: Denies changes in vision, no eye " pain  Ears/Nose/Throat/Mouth: Denies nasal congestion or sore throat   Cardiovascular: Denies chest pain or palpitations   Respiratory: Denies shortness of breath , Denies cough  Gastrointestinal/Hepatic: Denies abdominal pain, nausea, vomiting,   Skin/Breast: Denies rash,   Neurological: +headache, denies confusion,   Psychiatric: denies mood disorder   Sleep: + snoring       Past Medical History:   Diagnosis Date   • Cancer (HCC)     Breast; dx 2013   • Cataract    • CKD (chronic kidney disease) stage 3, GFR 30-59 ml/min 8/16/2017   • Gallstones    • Glaucoma     right eye   • Hashimoto's thyroiditis 7/22/2020   • Hyperlipidemia    • Hyperlipidemia LDL goal <100 1/12/2016   • Hypertension    • Malignant neoplasm of upper-outer quadrant of right breast in female, estrogen receptor positive (HCC) 7/10/2020    Complete mastectomy in 2013 with Dr. Garg.  Had Dr. Jay who recommended Femara for total of 10 yeas.   • Uncontrolled daytime somnolence 8/26/2020   • Unexplained night sweats 7/10/2020       Past Surgical History:   Procedure Laterality Date   • HYSTERECTOMY LAPAROSCOPY  51 y/o    cervix and bilat ovaries removed.   • KNEE ARTHROSCOPY  60 y/o   • KNEE ARTHROSCOPY Right    • MASTECTOMY  age 62    right mastectomy , 9 years ago   • TONSILLECTOMY     • TONSILLECTOMY AND ADENOIDECTOMY  7 y/o       Family History   Problem Relation Age of Onset   • Heart Disease Father         quadriple bypass    • Cancer Sister         breast with mets   • Hypertension Mother    • Lung Disease Mother         smoker   • Arthritis Mother         RA   • Stroke Mother    • Thyroid Mother    • Diabetes Maternal Uncle         type I   • Cancer Maternal Grandmother         Colon   • Diabetes Maternal Grandfather         type II   • Arthritis Daughter    • No Known Problems Daughter    • No Known Problems Paternal Grandmother    • No Known Problems Paternal Grandfather        Social History     Socioeconomic History   • Marital  status:      Spouse name: Not on file   • Number of children: Not on file   • Years of education: Not on file   • Highest education level: Associate degree: occupational, technical, or vocational program   Occupational History     Comment: Retired LPN   Social Needs   • Financial resource strain: Not hard at all   • Food insecurity     Worry: Never true     Inability: Never true   • Transportation needs     Medical: No     Non-medical: No   Tobacco Use   • Smoking status: Former Smoker     Packs/day: 0.25     Years: 10.00     Pack years: 2.50     Types: Cigarettes     Quit date: 2000     Years since quittin.0   • Smokeless tobacco: Never Used   Substance and Sexual Activity   • Alcohol use: Yes     Frequency: 4 or more times a week     Drinks per session: 1 or 2     Binge frequency: Never     Comment: glass of wine    • Drug use: No   • Sexual activity: Yes     Partners: 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: ; retired LPN   Lifestyle   • Physical activity     Days per week: 1 day     Minutes per session: 20 min   • Stress: Not at all   Relationships   • Social connections     Talks on phone: Three times a week     Gets together: Patient refused     Attends Spiritism service: More than 4 times per year     Active member of club or organization: No     Attends meetings of clubs or organizations: Never     Relationship status:    • Intimate partner violence     Fear of current or ex partner: Not on file     Emotionally abused: Not on file     Physically abused: Not on file     Forced sexual activity: Not on file   Other Topics Concern   • Not on file   Social History Narrative   • Not on file       Allergies as of 2021   • (No Known Allergies)        Vitals:  Vitals:    21 1338   BP: 126/70   Pulse: 76   Resp: 14   SpO2: 94%       Current medications as of today   Current Outpatient Medications   Medication Sig Dispense Refill   • pravastatin (PRAVACHOL) 20 MG Tab TAKE ONE TABLET BY MOUTH AT  BEDTIME  100 Tab 3   • vitamin D (CHOLECALCIFEROL) 1000 Unit (25 mcg) Tab Take 1,000 Units by mouth every day.     • letrozole (FEMARA) 2.5 MG Tab Take 2.5 mg by mouth every day. Indications: Cancer of the Breast     • calcium carbonate (OS-OMERO 500) 1250 MG Tab Take 1,250 mg by mouth 2 times a day, with meals.     • amLODIPine (NORVASC) 10 MG Tab Take 1 Tab by mouth every day. 90 Tab 3     No current facility-administered medications for this visit.          Physical Exam: Limited by COVID-19 precautions.  Appearance: Well developed, well nourished, no acute distress  Eyes: PERRL, EOM intact, sclera white, conjunctiva moist  Ears: no lesions or deformities  Hearing: grossly intact  Nose: no lesions or deformities  Respiratory effort: no intercostal retractions or use of accessory muscles  Extremities: no cyanosis or edema  Abdomen: soft   Gait and Station: normal  Digits and nails: no clubbing, cyanosis, petechiae or nodes.  Cranial nerves: grossly intact  Skin: no visible rashes, lesions or ulcers noted  Orientation: Oriented to time, person and place  Mood and affect: mood and affect appropriate, normal interaction with examiner  Judgement: Intact    Assessment:  1. MICHAEL (obstructive sleep apnea)     2. Essential hypertension     3. Chronic nasal congestion  REFERRAL TO ENT   4. Chronic ear pain, bilateral  REFERRAL TO ENT   5. BMI 29.0-29.9,adult           Plan  Discussed the cardiovascular and neuropsychiatric risks of untreated MICHAEL; including but not limited to: HTN, DM, MI, ASCVD, CVA, CHF, traffic accidents.     1. HSS from 21 indicated mild sleep apnea - MONTSE 8.9; a home sleep study may underestimate the severity of sleep apnea as the total sleep time is unknown and monitoring time is used instead. Persistent nocturnal desaturation - bar saturation 83% - less than 90% for 38.4% of the TIB. Significant snorin total snoring episodes/percentage of snoring 13.7%.   Recommendation:  If significant  "signs, symptoms, and or comorbidities warrant, recommend a positive airway pressure titration. Alternative treatments for OSAH include behavioral modification, use of a dental appliance, and nasopharyngeal reconstructive surgery. Behavior modification includes weight loss, eliminating alcohol and sedatives, and avoiding the supine position.     2. Patient is not amenable to auto CPAP trial. She would prefer to focus on weight loss to help with apnea. Will repeat HSS at next office if patient has at least 20-30 lb weight loss. Advised patient she may try OTC \"boil and bite\" mouth guard.   3. Referral to Dr. Wells, ENT, for further chronic nasal congestion and chronic bilateral ear pain evaluation.  Advised patient she may try over-the-counter Flonase and saline rinses.  4. Continue to stay active.    5. Follow up with the appropriate healthcare practitioners for all other medical problems and issues.  6. Sleep hygiene discussed.    7. Continue to f/u with PCP for BP management, take BP medication as directed and check BP at home.   8. F/u in 6 months.       REFUGIO May.      This dictation was created using voice recognition software. The accuracy of the dictation is limited to the abilities of the software. I expect there may be some errors of grammar and possibly content.      "

## 2021-01-26 NOTE — PROCEDURES
Interpretation:    This home sleep test was performed on 2021 using a [employer] NightOne recording device. The device has 3 sensors (effort belt, cannula and oximeter) and a built-in body position sensor that provide seven channels of data (body position, pressure flow, snore, respiratory effort, SpO2, pleth and pulse rate).  The effort belt utilizes respiratory inductive plethysmography technology.      The total recording time was 506.3 minutes, the time in bed was 404.0 minutes, and the monitoring time was 404.0 minutes.    The device recorded 2 central apneas, 16 obstructive apneas, 0 mixed apneas, 42 hypopneas, 60 apneas and hypopneas, and 0 RERAs.  The MONTSE (respiratory event index which is a surrogate for the AHI) was 8.9.  The OAI (obstructive apnea index) was 2.4.  The JANINE (the central apnea index) was 0.3.  The supine MONTSE was 0.  Most of the respiratory events occurred in the left sided position.    The patient experienced 50 desaturations and the oxygen desaturation index was 7.8.  During sleep the average saturation was 90%, the bar saturation was 83%, and the highest saturation was 97%.  The patient spent 38.4% of TIB with saturations less than 90%.      The mean heart rate during sleep was 76 bpm, the maximum heart rate during sleep was 109 bpm, and the minimum heart rate during sleep was 41 bpm.    The patient experienced 206 snoring episodes.  The percentage of snoring was 13.7%.        Assessment:    Mild sleep apnea - MONTSE 8.9; a home sleep study may underestimate the severity of sleep apnea as the total sleep time is unknown and monitoring time is used instead.  Persistent nocturnal desaturation - bar saturation 83% - less than 90% for 38.4% of the TIB  Significant snorin total snoring episodes/percentage of snoring 13.7%        Recommendation:    If significant signs, symptoms, and or comorbidities warrant, recommend a positive airway pressure titration. Alternative  treatments for OSAH include behavioral modification, use of a dental appliance, and nasopharyngeal reconstructive surgery. Behavior modification includes weight loss, eliminating alcohol and sedatives, and avoiding the supine position. If alternative treatments are used, a follow-up diagnostic study is warranted to ensure efficacy.

## 2021-02-10 ENCOUNTER — HOSPITAL ENCOUNTER (OUTPATIENT)
Dept: RADIOLOGY | Facility: MEDICAL CENTER | Age: 71
End: 2021-02-10
Attending: FAMILY MEDICINE
Payer: MEDICARE

## 2021-02-10 DIAGNOSIS — E06.3 HASHIMOTO'S THYROIDITIS: ICD-10-CM

## 2021-02-10 PROCEDURE — 76536 US EXAM OF HEAD AND NECK: CPT

## 2021-02-19 ENCOUNTER — HOSPITAL ENCOUNTER (OUTPATIENT)
Dept: LAB | Facility: MEDICAL CENTER | Age: 71
End: 2021-02-19
Attending: FAMILY MEDICINE
Payer: MEDICARE

## 2021-02-19 ENCOUNTER — OFFICE VISIT (OUTPATIENT)
Dept: MEDICAL GROUP | Facility: LAB | Age: 71
End: 2021-02-19
Payer: MEDICARE

## 2021-02-19 VITALS
DIASTOLIC BLOOD PRESSURE: 78 MMHG | BODY MASS INDEX: 28.32 KG/M2 | SYSTOLIC BLOOD PRESSURE: 118 MMHG | WEIGHT: 170 LBS | OXYGEN SATURATION: 95 % | RESPIRATION RATE: 16 BRPM | TEMPERATURE: 97.5 F | HEART RATE: 93 BPM | HEIGHT: 65 IN

## 2021-02-19 DIAGNOSIS — E06.3 AUTOIMMUNE THYROIDITIS: ICD-10-CM

## 2021-02-19 DIAGNOSIS — I10 ESSENTIAL HYPERTENSION: ICD-10-CM

## 2021-02-19 DIAGNOSIS — R53.83 OTHER FATIGUE: ICD-10-CM

## 2021-02-19 LAB
25(OH)D3 SERPL-MCNC: 67 NG/ML (ref 30–100)
T3 SERPL-MCNC: 139 NG/DL (ref 60–181)
T4 FREE SERPL-MCNC: 1.17 NG/DL (ref 0.93–1.7)

## 2021-02-19 PROCEDURE — 84480 ASSAY TRIIODOTHYRONINE (T3): CPT

## 2021-02-19 PROCEDURE — 84439 ASSAY OF FREE THYROXINE: CPT

## 2021-02-19 PROCEDURE — 82306 VITAMIN D 25 HYDROXY: CPT

## 2021-02-19 PROCEDURE — 36415 COLL VENOUS BLD VENIPUNCTURE: CPT

## 2021-02-19 PROCEDURE — 99214 OFFICE O/P EST MOD 30 MIN: CPT | Performed by: FAMILY MEDICINE

## 2021-02-19 RX ORDER — LETROZOLE 2.5 MG/1
1 TABLET, FILM COATED ORAL DAILY
COMMUNITY
End: 2021-04-20

## 2021-02-19 ASSESSMENT — FIBROSIS 4 INDEX: FIB4 SCORE: 1.06

## 2021-02-19 NOTE — PROGRESS NOTES
Chief Complaint:   Chief Complaint   Patient presents with   • Hypertension Follow-up     new meds are ok       HPI: Established patient  Lauren Jiang is a 70 y.o. fe/male who presents for follow-up and review lab work results      Autoimmune thyroiditis  Lab work results reviewed with the patient, elevated thyroid autoimmune antibodies, patient reports a lot of tiredness and fatigue, thyroid function TSH within normal limits    Other fatigue  Reports continuous fatigue for a long time, patient is telling me today that I am tired from fatigue.  All labs without significant findings.  Patient said she tries her best to hydrate well and have a healthy lifestyle, denies depression    Hypertension  Blood pressure at the office today at goal 118/78, continues to take Norvasc 10 mg daily, denies headache or chest pain or shortness of breath.    Reviewed lab work results, low GFR  Past medical history, family history, social history and medications reviewed and updated in the record.  Today  Current medications, problem list and allergies reviewed in EPIC today  Health maintenance topics are reviewed and updated.    Patient Active Problem List    Diagnosis Date Noted   • CKD (chronic kidney disease) stage 3, GFR 30-59 ml/min ([facility]) 08/16/2017   • History of breast cancer 01/12/2016   • Mixed hyperlipidemia 01/12/2016   • Uncontrolled daytime somnolence 08/26/2020   • Hashimoto's thyroiditis 07/22/2020   • Malignant neoplasm of upper-outer quadrant of right breast in female, estrogen receptor positive (HCC) 07/10/2020   • Unexplained night sweats 07/10/2020   • Overweight (BMI 25.0-29.9) 08/23/2017   • Essential hypertension 01/12/2016   • Glaucoma 01/12/2016   • Osteopenia 01/12/2016     Family History   Problem Relation Age of Onset   • Heart Disease Father         quadriple bypass    • Cancer Sister         breast with mets   • Hypertension Mother    • Lung Disease Mother         smoker   • Arthritis Mother          RA   • Stroke Mother    • Thyroid Mother    • Diabetes Maternal Uncle         type I   • Cancer Maternal Grandmother         Colon   • Diabetes Maternal Grandfather         type II   • Arthritis Daughter    • No Known Problems Daughter    • No Known Problems Paternal Grandmother    • No Known Problems Paternal Grandfather      Social History     Socioeconomic History   • Marital status:      Spouse name: Not on file   • Number of children: Not on file   • Years of education: Not on file   • Highest education level: Associate degree: occupational, technical, or vocational program   Occupational History     Comment: Retired LPN   Tobacco Use   • Smoking status: Former Smoker     Packs/day: 0.25     Years: 10.00     Pack years: 2.50     Types: Cigarettes     Quit date: 2000     Years since quittin.1   • Smokeless tobacco: Never Used   Substance and Sexual Activity   • Alcohol use: Yes     Comment: glass of wine    • Drug use: No   • Sexual activity: Yes     Partners: 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: ; retired LPN   Other Topics Concern   • Not on file   Social History Narrative   • Not on file     Social Determinants of Health     Financial Resource Strain: Low Risk    • Difficulty of Paying Living Expenses: Not hard at all   Food Insecurity: No Food Insecurity   • Worried About Running Out of Food in the Last Year: Never true   • Ran Out of Food in the Last Year: Never true   Transportation Needs: No Transportation Needs   • Lack of Transportation (Medical): No   • Lack of Transportation (Non-Medical): No   Physical Activity: Insufficiently Active   • Days of Exercise per Week: 1 day   • Minutes of Exercise per Session: 20 min   Stress: No Stress Concern Present   • Feeling of Stress : Not at all   Social Connections: Slightly Isolated   • Frequency of Communication with Friends and Family: Three times a week   • Frequency of Social Gatherings with Friends and Family: Patient refused   • Attends Jain  "Services: More than 4 times per year   • Active Member of Clubs or Organizations: No   • Attends Club or Organization Meetings: Never   • Marital Status:    Intimate Partner Violence:    • Fear of Current or Ex-Partner:    • Emotionally Abused:    • Physically Abused:    • Sexually Abused:        Current Outpatient Medications   Medication Sig Dispense Refill   • amLODIPine (NORVASC) 10 MG Tab Take 1 Tab by mouth every day. 90 Tab 3   • pravastatin (PRAVACHOL) 20 MG Tab TAKE ONE TABLET BY MOUTH AT BEDTIME  100 Tab 3   • vitamin D (CHOLECALCIFEROL) 1000 Unit (25 mcg) Tab Take 1,000 Units by mouth every day.     • calcium carbonate (OS-OMERO 500) 1250 MG Tab Take 1,250 mg by mouth 2 times a day, with meals.     • letrozole (FEMARA) 2.5 MG Tab Take 1 Applicator by mouth every day.     • letrozole (FEMARA) 2.5 MG Tab Take 2.5 mg by mouth every day. Indications: Cancer of the Breast       No current facility-administered medications for this visit.         Review Of Systems  As documented in HPI above  PHYSICAL EXAMINATION:    /78 (BP Location: Left arm, Patient Position: Sitting, BP Cuff Size: Adult)   Pulse 93   Temp 36.4 °C (97.5 °F) (Temporal)   Resp 16   Ht 1.651 m (5' 5\")   Wt 77.1 kg (170 lb)   SpO2 95%   BMI 28.29 kg/m²   Gen.: Well-developed, well-nourished, no apparent distress, pleasant and cooperative with the examination  HEENT: Normocephalic/atraumatic, sinuses nontender with palpation, TMs clear, nares patent with pink mucosa and clear rhinorrhea, oropharynx clear  Neck: No JVD or bruits, no adenopathy  Cor: Regular rate and rhythm without murmur gallop or rub  Lungs: Clear to auscultation with equal breath sounds bilaterally. No wheezes, rhonchi.  Abdomen: Soft nontender without hepatosplenomegaly or masses appreciated, normoactive bowel sounds  Extremities: No cyanosis, clubbing or edema          ASSESSMENT/Plan:  1. Autoimmune thyroiditis   chronic, with normal thyroid function test, " advised to follow-up with endocrinology for further evaluation and assessment of autoimmune thyroiditis  REFERRAL TO ENDOCRINOLOGY    TRIIDOTHYRONINE    FREE THYROXINE   2. Other fatigue   chronic fatigue of unclear etiology, discussed with the patient possibly autoimmune thyroiditis.  She will follow-up with endocrinology, continue healthy lifestyle changes, healthy diet exercise hydration.  Will monitor also kidney function test for low GFR  REFERRAL TO ENDOCRINOLOGY    TRIIDOTHYRONINE    FREE THYROXINE    VITAMIN D,25 HYDROXY   3.  Hypertension: Chronic well-controlled on amlodipine.  Patient is not comfortable with ARB or ACE inhibitors which I discussed with her that might be kidney protective because of her low GFR.  Would like to continue with Norvasc only.  If blood pressure at goal    Please note that this dictation was created using voice recognition software. I have made every reasonable attempt to correct obvious errors but there may be errors of grammar and content that I may have overlooked prior to finalization of this note.

## 2021-03-26 ENCOUNTER — TELEPHONE (OUTPATIENT)
Dept: ENDOCRINOLOGY | Facility: MEDICAL CENTER | Age: 71
End: 2021-03-26

## 2021-03-26 NOTE — TELEPHONE ENCOUNTER
VOICEMAIL  1. Caller Name: Lauren Jiang                        Call Back Number: [phone] (home)       2. Message: Patient left a voicemail on   2/22/21 patient would like to schedule an new patient appointment.    3. Patient approves office to leave a detailed /voicemail/MyChart message: yes    Contacted patient and scheduled her NP appointment. Patient was very thankful for the call back because she has been trying to call for a long time. I apologized for the inconvenience and she was very happy for the call.

## 2021-04-06 ENCOUNTER — TELEPHONE (OUTPATIENT)
Dept: ENDOCRINOLOGY | Facility: MEDICAL CENTER | Age: 71
End: 2021-04-06

## 2021-04-13 ENCOUNTER — TELEPHONE (OUTPATIENT)
Dept: MEDICAL GROUP | Facility: LAB | Age: 71
End: 2021-04-13

## 2021-04-13 NOTE — TELEPHONE ENCOUNTER
ESTABLISHED PATIENT PRE-VISIT PLANNING     Patient was NOT contacted to complete PVP.     Note: Patient will not be contacted if there is no indication to call.     1.  Reviewed notes from the last few office visits within the medical group: Yes    2.  If any orders were placed at last visit or intended to be done for this visit (i.e. 6 mos follow-up), do we have Results/Consult Notes?         •  Labs - Labs ordered, completed on 2/19/21 and results are in chart.  Note: If patient appointment is for lab review and patient did not complete labs, check with provider if OK to reschedule patient until labs completed.       •  Imaging - Imaging ordered, completed and results are in chart.       •  Referrals - Referral ordered, patient has NOT been seen. scheduled     3. Is this appointment scheduled as a Hospital Follow-Up? No    4.  Immunizations were updated in Epic using Reconcile Outside Information activity? Yes    5.  Patient is due for the following Health Maintenance Topics:   Health Maintenance Due   Topic Date Due   • IMM DTaP/Tdap/Td Vaccine (2 - Td) 02/17/2021   • Annual Wellness Visit  02/19/2021         6.  AHA (Pulse8) form printed for Provider? Email sent to [facility] requesting form if needed

## 2021-04-20 ENCOUNTER — OFFICE VISIT (OUTPATIENT)
Dept: MEDICAL GROUP | Facility: LAB | Age: 71
End: 2021-04-20
Payer: MEDICARE

## 2021-04-20 VITALS
OXYGEN SATURATION: 94 % | BODY MASS INDEX: 28.82 KG/M2 | DIASTOLIC BLOOD PRESSURE: 78 MMHG | TEMPERATURE: 97.3 F | SYSTOLIC BLOOD PRESSURE: 118 MMHG | WEIGHT: 173 LBS | HEART RATE: 106 BPM | RESPIRATION RATE: 16 BRPM | HEIGHT: 65 IN

## 2021-04-20 DIAGNOSIS — R94.4 DECREASED GFR: ICD-10-CM

## 2021-04-20 DIAGNOSIS — R06.02 SOB (SHORTNESS OF BREATH) ON EXERTION: ICD-10-CM

## 2021-04-20 PROCEDURE — 93000 ELECTROCARDIOGRAM COMPLETE: CPT | Performed by: FAMILY MEDICINE

## 2021-04-20 PROCEDURE — 99214 OFFICE O/P EST MOD 30 MIN: CPT | Performed by: FAMILY MEDICINE

## 2021-04-20 ASSESSMENT — FIBROSIS 4 INDEX: FIB4 SCORE: 1.06

## 2021-04-20 NOTE — PROGRESS NOTES
Chief Complaint:   Chief Complaint   Patient presents with   • Medication Follow-up       HPI: Established patient  Lauren Jiang is a 70 y.o. female who presents for evaluation of shortness of breath, discussed the following today    1. Decreased GFR  History of chronic kidney disease, patient needs to repeat another GFR and kidney function test for further evaluation asymptomatic at this time other than increased shortness of breath as mentioned below.    2. SOB (shortness of breath) on exertion  Reports that for the past 1 months she has been experiencing shortness of breath with exertion especially, patient said while doing her regular home chores like vacuuming she feels short of breath and she has to stop.  Reports no chest pain or palpitations.  Noticed also on her vital signs that her pulse rate is on the high side.  Patient reports no lower extremity edema but she feels that her ankles are swollen more than usual.          Past medical history, family history, social history and medications reviewed and updated in the record.  Today current medications, problem list and allergies reviewed in [facility]  Health maintenance topics are reviewed and updated.  Today    Patient Active Problem List    Diagnosis Date Noted   • CKD (chronic kidney disease) stage 3, GFR 30-59 ml/min ([facility]) 08/16/2017   • History of breast cancer 01/12/2016   • Mixed hyperlipidemia 01/12/2016   • Uncontrolled daytime somnolence 08/26/2020   • Hashimoto's thyroiditis 07/22/2020   • Malignant neoplasm of upper-outer quadrant of right breast in female, estrogen receptor positive (HCC) 07/10/2020   • Unexplained night sweats 07/10/2020   • Overweight (BMI 25.0-29.9) 08/23/2017   • Chronic fatigue 08/30/2016   • Essential hypertension 01/12/2016   • Glaucoma 01/12/2016   • Osteopenia 01/12/2016     Family History   Problem Relation Age of Onset   • Heart Disease Father         quadriple bypass    • Cancer Sister         breast with mets   •  Hypertension Mother    • Lung Disease Mother         smoker   • Arthritis Mother         RA   • Stroke Mother    • Thyroid Mother    • Diabetes Maternal Uncle         type I   • Cancer Maternal Grandmother         Colon   • Diabetes Maternal Grandfather         type II   • Arthritis Daughter    • No Known Problems Daughter    • No Known Problems Paternal Grandmother    • No Known Problems Paternal Grandfather      Social History     Socioeconomic History   • Marital status:      Spouse name: Not on file   • Number of children: Not on file   • Years of education: Not on file   • Highest education level: Associate degree: occupational, technical, or vocational program   Occupational History     Comment: Retired LPN   Tobacco Use   • Smoking status: Former Smoker     Packs/day: 0.25     Years: 10.00     Pack years: 2.50     Types: Cigarettes     Quit date: 2000     Years since quittin.3   • Smokeless tobacco: Never Used   Substance and Sexual Activity   • Alcohol use: Yes     Comment: glass of wine    • Drug use: No   • Sexual activity: Yes     Partners: 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: ; retired LPN   Other Topics Concern   • Not on file   Social History Narrative   • Not on file     Social Determinants of Health     Financial Resource Strain: Low Risk    • Difficulty of Paying Living Expenses: Not hard at all   Food Insecurity: No Food Insecurity   • Worried About Running Out of Food in the Last Year: Never true   • Ran Out of Food in the Last Year: Never true   Transportation Needs: No Transportation Needs   • Lack of Transportation (Medical): No   • Lack of Transportation (Non-Medical): No   Physical Activity: Insufficiently Active   • Days of Exercise per Week: 1 day   • Minutes of Exercise per Session: 20 min   Stress: No Stress Concern Present   • Feeling of Stress : Not at all   Social Connections: Slightly Isolated   • Frequency of Communication with Friends and Family: Three times a week   • Frequency  "of Social Gatherings with Friends and Family: Patient refused   • Attends [employer] Services: More than 4 times per year   • Active Member of Clubs or Organizations: No   • Attends Club or Organization Meetings: Never   • Marital Status:    Intimate Partner Violence:    • Fear of Current or Ex-Partner:    • Emotionally Abused:    • Physically Abused:    • Sexually Abused:      Current Outpatient Medications   Medication Sig Dispense Refill   • letrozole (FEMARA) 2.5 MG Tab Take 1 Applicator by mouth every day.     • amLODIPine (NORVASC) 10 MG Tab Take 1 Tab by mouth every day. 90 Tab 3   • pravastatin (PRAVACHOL) 20 MG Tab TAKE ONE TABLET BY MOUTH AT BEDTIME  100 Tab 3   • vitamin D (CHOLECALCIFEROL) 1000 Unit (25 mcg) Tab Take 1,000 Units by mouth every day.     • letrozole (FEMARA) 2.5 MG Tab Take 2.5 mg by mouth every day. Indications: Cancer of the Breast     • calcium carbonate (OS-OMERO 500) 1250 MG Tab Take 1,250 mg by mouth 2 times a day, with meals.       No current facility-administered medications for this visit.           Review Of Systems  As documented in HPI above  PHYSICAL EXAMINATION:    /78   Pulse (!) 106   Temp 36.3 °C (97.3 °F)   Resp 16   Ht 1.651 m (5' 5\")   Wt 78.5 kg (173 lb)   SpO2 94%   BMI 28.79 kg/m²   Gen.: Well-developed, well-nourished, no apparent distress, pleasant and cooperative with the examination  HEENT: Normocephalic/atraumatic, sinuses nontender with palpation, TMs clear, nares patent with pink mucosa and clear rhinorrhea, oropharynx clear  Neck: No JVD or bruits, no adenopathy  Cor: Regular rate but I have noticed some skipped beats, and rhythm without murmur gallop or rub  Lungs: Clear to auscultation with equal breath sounds bilaterally. No wheezes, rhonchi.  Abdomen: Soft nontender without hepatosplenomegaly or masses appreciated, normoactive bowel sounds  Extremities: No cyanosis, clubbing or edema    EKG done at the office today interpreted by me: " Regular rhythm at 100/min with sinus tachycardia, some changes might be suggestive of Q waves on the inferior leads.  Interpretation abnormal EKG, compared to previous EKGs done at the hospital no recent EKG since 2017    ASSESSMENT/Plan:  1. Decreased GFR   chronic, repeat another kidney function test for further evaluation and follow-up  Comp Metabolic Panel   2. SOB (shortness of breath) on exertion   new concern, EKG at the office is abnormal as described above, patient with shortness of breath on exertion advised to do a chest x-ray and an echocardiogram and follow-up with cardiology as directed, if any worsening of symptoms to report to emergency room,  EKG - Clinic Performed    DX-CHEST-2 VIEWS    EC-ECHOCARDIOGRAM COMPLETE W/O CONT    REFERRAL TO CARDIOLOGY     Please note that this dictation was created using voice recognition software. I have made every reasonable attempt to correct obvious errors but there may be errors of grammar and content that I may have overlooked prior to finalization of this note.

## 2021-04-23 ENCOUNTER — HOSPITAL ENCOUNTER (OUTPATIENT)
Dept: LAB | Facility: MEDICAL CENTER | Age: 71
End: 2021-04-23
Attending: FAMILY MEDICINE
Payer: MEDICARE

## 2021-04-23 DIAGNOSIS — R94.4 DECREASED GFR: ICD-10-CM

## 2021-04-23 PROCEDURE — 36415 COLL VENOUS BLD VENIPUNCTURE: CPT

## 2021-04-23 PROCEDURE — 80053 COMPREHEN METABOLIC PANEL: CPT

## 2021-04-24 LAB
ALBUMIN SERPL BCP-MCNC: 4.9 G/DL (ref 3.2–4.9)
ALBUMIN/GLOB SERPL: 1.6 G/DL
ALP SERPL-CCNC: 80 U/L (ref 30–99)
ALT SERPL-CCNC: 41 U/L (ref 2–50)
ANION GAP SERPL CALC-SCNC: 9 MMOL/L (ref 7–16)
AST SERPL-CCNC: 31 U/L (ref 12–45)
BILIRUB SERPL-MCNC: 0.8 MG/DL (ref 0.1–1.5)
BUN SERPL-MCNC: 11 MG/DL (ref 8–22)
CALCIUM SERPL-MCNC: 10.7 MG/DL (ref 8.5–10.5)
CHLORIDE SERPL-SCNC: 98 MMOL/L (ref 96–112)
CO2 SERPL-SCNC: 28 MMOL/L (ref 20–33)
CREAT SERPL-MCNC: 1.07 MG/DL (ref 0.5–1.4)
FASTING STATUS PATIENT QL REPORTED: NORMAL
GLOBULIN SER CALC-MCNC: 3 G/DL (ref 1.9–3.5)
GLUCOSE SERPL-MCNC: 85 MG/DL (ref 65–99)
POTASSIUM SERPL-SCNC: 3.7 MMOL/L (ref 3.6–5.5)
PROT SERPL-MCNC: 7.9 G/DL (ref 6–8.2)
SODIUM SERPL-SCNC: 135 MMOL/L (ref 135–145)

## 2021-04-27 ENCOUNTER — OFFICE VISIT (OUTPATIENT)
Dept: ENDOCRINOLOGY | Facility: MEDICAL CENTER | Age: 71
End: 2021-04-27
Attending: INTERNAL MEDICINE
Payer: MEDICARE

## 2021-04-27 ENCOUNTER — HOSPITAL ENCOUNTER (OUTPATIENT)
Dept: LAB | Facility: MEDICAL CENTER | Age: 71
End: 2021-04-27
Attending: INTERNAL MEDICINE
Payer: MEDICARE

## 2021-04-27 VITALS
HEIGHT: 65 IN | OXYGEN SATURATION: 93 % | BODY MASS INDEX: 29.07 KG/M2 | SYSTOLIC BLOOD PRESSURE: 120 MMHG | DIASTOLIC BLOOD PRESSURE: 70 MMHG | WEIGHT: 174.5 LBS | HEART RATE: 127 BPM

## 2021-04-27 DIAGNOSIS — E04.2 NON-TOXIC MULTINODULAR GOITER: ICD-10-CM

## 2021-04-27 DIAGNOSIS — E83.52 HYPERCALCEMIA: ICD-10-CM

## 2021-04-27 DIAGNOSIS — R79.9 ABNORMAL FINDING OF BLOOD CHEMISTRY, UNSPECIFIED: ICD-10-CM

## 2021-04-27 DIAGNOSIS — E06.3 HASHIMOTO'S THYROIDITIS: ICD-10-CM

## 2021-04-27 DIAGNOSIS — R53.83 FATIGUE, UNSPECIFIED TYPE: ICD-10-CM

## 2021-04-27 DIAGNOSIS — M85.80 OSTEOPENIA, UNSPECIFIED LOCATION: ICD-10-CM

## 2021-04-27 PROCEDURE — 83970 ASSAY OF PARATHORMONE: CPT

## 2021-04-27 PROCEDURE — 82607 VITAMIN B-12: CPT

## 2021-04-27 PROCEDURE — 80053 COMPREHEN METABOLIC PANEL: CPT

## 2021-04-27 PROCEDURE — 84155 ASSAY OF PROTEIN SERUM: CPT

## 2021-04-27 PROCEDURE — 85025 COMPLETE CBC W/AUTO DIFF WBC: CPT

## 2021-04-27 PROCEDURE — 83550 IRON BINDING TEST: CPT

## 2021-04-27 PROCEDURE — 83540 ASSAY OF IRON: CPT

## 2021-04-27 PROCEDURE — 82306 VITAMIN D 25 HYDROXY: CPT

## 2021-04-27 PROCEDURE — 84100 ASSAY OF PHOSPHORUS: CPT

## 2021-04-27 PROCEDURE — 84439 ASSAY OF FREE THYROXINE: CPT

## 2021-04-27 PROCEDURE — 84443 ASSAY THYROID STIM HORMONE: CPT

## 2021-04-27 PROCEDURE — 84165 PROTEIN E-PHORESIS SERUM: CPT

## 2021-04-27 PROCEDURE — 36415 COLL VENOUS BLD VENIPUNCTURE: CPT

## 2021-04-27 PROCEDURE — 99205 OFFICE O/P NEW HI 60 MIN: CPT | Performed by: INTERNAL MEDICINE

## 2021-04-27 PROCEDURE — 82330 ASSAY OF CALCIUM: CPT

## 2021-04-27 PROCEDURE — 82728 ASSAY OF FERRITIN: CPT

## 2021-04-27 PROCEDURE — 99211 OFF/OP EST MAY X REQ PHY/QHP: CPT | Performed by: INTERNAL MEDICINE

## 2021-04-27 ASSESSMENT — FIBROSIS 4 INDEX: FIB4 SCORE: 1.21

## 2021-04-27 NOTE — PROGRESS NOTES
Chief Complaint: Consult requested by Tre Britton M.D. for evaluation of Hashimoto's thyroiditis and NTMNG     HPI:     Lauren Jiang is a 70 y.o. female with history of Hashimoto's thyroiditis initially diagnosed on July 2020 by her primary care physician baseline TPO antibodies were 115 on July 2020 and 97 on January 2021        She is euthyroid at baseline however her TSH was 1.6 on February 20, 2020  TSH was 0.608 on January 11, 2021 with normal free T4 levels and total T3 levels  She is currently not on thyroid hormone replacement therapy      Despite having normal TSH levels she has multiple somatic complaints such as fatigue, coldness.  Difficulty losing weight, dry skin and hair loss and the symptoms are not explained by her normal TSH levels.        She is taking letrozole for history of breast cancer  She is currently not on bisphosphonate therapy  Bone density on September 16, 2019 showed the lowest T score of -1.5 for the left femur with a FRAX score of 10.2% and 1.5% respectively for 10-year risk of any major fracture and hip fracture        Incidentally her calcium was high normal at 10.3 on labs from 1/20/2021  Vitamin D was 67  PTH levels are not available  Serum creatinine was abnormal 1.2  Albumin was 4.6        She had a thyroid ultrasound on thyroid 10/20/2021 which showed spongiform solid nodules which are low risk for malignancy on the right lower lobe measuring twos 0.4 cm and on the right mid lobe measuring 1.1 cm and  right upper lobe measuring 1.1 cm and left lower lobe measuring 1.0 cm          Patient's medications, allergies, and social histories were reviewed and updated as appropriate.      ROS:     CONS:     No fever, no chills, no weight loss, reports fatigue   EYES:      No diplopia, no blurry vision, no redness of eyes, no swelling of eyelids   ENT:    No hearing loss, No ear pain, No sore throat, no dysphagia, no neck swelling   CV:     No chest pain, no palpitations, no  claudication, no orthopnea, no PND   PULM:    No SOB, no cough, no hemoptysis, no wheezing    GI:   No nausea, no vomiting, no diarrhea, no constipation, no bloody stools   :  Passing urine well, no dysuria, no hematuria   ENDO:   No polyuria, no polydipsia, no heat intolerance, no cold intolerance   NEURO: No headaches, no dizziness, no convulsions, no tremors   MUSC:  No joint swellings, no arthralgias, no myalgias, no weakness   SKIN:   No rash, no ulcers, reports dry skin   PSYCH:   No depression, no anxiety, no difficulty sleeping       Past Medical History:  Patient Active Problem List    Diagnosis Date Noted   • CKD (chronic kidney disease) stage 3, GFR 30-59 ml/min ([facility]) 08/16/2017   • History of breast cancer 01/12/2016   • Mixed hyperlipidemia 01/12/2016   • Uncontrolled daytime somnolence 08/26/2020   • Hashimoto's thyroiditis 07/22/2020   • Malignant neoplasm of upper-outer quadrant of right breast in female, estrogen receptor positive (HCC) 07/10/2020   • Unexplained night sweats 07/10/2020   • Overweight (BMI 25.0-29.9) 08/23/2017   • Chronic fatigue 08/30/2016   • Essential hypertension 01/12/2016   • Glaucoma 01/12/2016   • Osteopenia 01/12/2016       Past Surgical History:  Past Surgical History:   Procedure Laterality Date   • HYSTERECTOMY LAPAROSCOPY  51 y/o    cervix and bilat ovaries removed.   • KNEE ARTHROSCOPY  58 y/o   • KNEE ARTHROSCOPY Right    • MASTECTOMY  age 62    right mastectomy , 9 years ago   • TONSILLECTOMY     • TONSILLECTOMY AND ADENOIDECTOMY  7 y/o        Allergies:  Patient has no known allergies.     Current Medications:    Current Outpatient Medications:   •  amLODIPine (NORVASC) 10 MG Tab, Take 1 Tab by mouth every day., Disp: 90 Tab, Rfl: 3  •  pravastatin (PRAVACHOL) 20 MG Tab, TAKE ONE TABLET BY MOUTH AT BEDTIME , Disp: 100 Tab, Rfl: 3  •  vitamin D (CHOLECALCIFEROL) 1000 Unit (25 mcg) Tab, Take 1,000 Units by mouth every day., Disp: , Rfl:   •  letrozole (FEMARA) 2.5  MG Tab, Take 2.5 mg by mouth every day. Indications: Cancer of the Breast, Disp: , Rfl:   •  calcium carbonate (OS-OMERO 500) 1250 MG Tab, Take 1,250 mg by mouth 2 times a day, with meals., Disp: , Rfl:     Social History:  Social History     Socioeconomic History   • Marital status:      Spouse name: Not on file   • Number of children: Not on file   • Years of education: Not on file   • Highest education level: Associate degree: occupational, technical, or vocational program   Occupational History     Comment: Retired LPN   Tobacco Use   • Smoking status: Former Smoker     Packs/day: 0.25     Years: 10.00     Pack years: 2.50     Types: Cigarettes     Quit date: 2000     Years since quittin.3   • Smokeless tobacco: Never Used   Substance and Sexual Activity   • Alcohol use: Yes     Comment: glass of wine    • Drug use: No   • Sexual activity: Yes     Partners: 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: ; retired LPN   Other Topics Concern   • Not on file   Social History Narrative   • Not on file     Social Determinants of Health     Financial Resource Strain: Low Risk    • Difficulty of Paying Living Expenses: Not hard at all   Food Insecurity: No Food Insecurity   • Worried About Running Out of Food in the Last Year: Never true   • Ran Out of Food in the Last Year: Never true   Transportation Needs: No Transportation Needs   • Lack of Transportation (Medical): No   • Lack of Transportation (Non-Medical): No   Physical Activity: Insufficiently Active   • Days of Exercise per Week: 1 day   • Minutes of Exercise per Session: 20 min   Stress: No Stress Concern Present   • Feeling of Stress : Not at all   Social Connections: Slightly Isolated   • Frequency of Communication with Friends and Family: Three times a week   • Frequency of Social Gatherings with Friends and Family: Patient refused   • Attends [employer] Services: More than 4 times per year   • Active Member of Clubs or Organizations: No   • Attends Club or  "Organization Meetings: Never   • Marital Status:    Intimate Partner Violence:    • Fear of Current or Ex-Partner:    • Emotionally Abused:    • Physically Abused:    • Sexually Abused:         Family History:   Family History   Problem Relation Age of Onset   • Heart Disease Father         quadriple bypass    • Cancer Sister         breast with mets   • Hypertension Mother    • Lung Disease Mother         smoker   • Arthritis Mother         RA   • Stroke Mother    • Thyroid Mother    • Diabetes Maternal Uncle         type I   • Cancer Maternal Grandmother         Colon   • Diabetes Maternal Grandfather         type II   • Arthritis Daughter    • No Known Problems Daughter    • No Known Problems Paternal Grandmother    • No Known Problems Paternal Grandfather          PHYSICAL EXAM:   Vital signs: /70 (BP Location: Left arm, Patient Position: Sitting, BP Cuff Size: Adult)   Pulse (!) 127   Ht 1.651 m (5' 5\")   Wt 79.2 kg (174 lb 8 oz)   SpO2 93%   BMI 29.04 kg/m²   GENERAL: Well-developed, well-nourished  in no apparent distress.   EYE: No ocular and eyelid asymmetry, Anicteric sclerae,  PERRL  HENT: Hearing grossly intact, Normocephalic, atraumatic. Pink, moist mucous membranes, No exudate  NECK: Supple. Trachea midline. Thyroid is slightly enlarged and feels bosselated  CARDIOVASCULAR: Regular rate and rhythm. No murmurs, rubs, or gallops.   LUNGS: Clear to auscultation bilaterally   ABDOMEN: Soft, nontender with positive bowel sounds.   EXTREMITIES: No clubbing, cyanosis, or edema.   NEUROLOGICAL: Cranial nerves II-XII are grossly intact   Symmetric reflexes at the patella no proximal muscle weakness  LYMPH: No cervical, supraclavicular,  adenopathy palpated.   SKIN: No rashes, lesions. Turgor is normal.    Labs:  Lab Results   Component Value Date/Time    WBC 5.0 07/13/2020 11:51 AM    RBC 5.28 07/13/2020 11:51 AM    HEMOGLOBIN 15.4 07/13/2020 11:51 AM    MCV 88.8 07/13/2020 11:51 AM    MCH " 29.2 07/13/2020 11:51 AM    MCHC 32.8 (L) 07/13/2020 11:51 AM    RDW 40.4 07/13/2020 11:51 AM    MPV 9.6 07/13/2020 11:51 AM       Lab Results   Component Value Date/Time    SODIUM 135 04/23/2021 11:15 AM    POTASSIUM 3.7 04/23/2021 11:15 AM    CHLORIDE 98 04/23/2021 11:15 AM    CO2 28 04/23/2021 11:15 AM    ANION 9.0 04/23/2021 11:15 AM    GLUCOSE 85 04/23/2021 11:15 AM    BUN 11 04/23/2021 11:15 AM    CREATININE 1.07 04/23/2021 11:15 AM    CALCIUM 10.7 (H) 04/23/2021 11:15 AM    ASTSGOT 31 04/23/2021 11:15 AM    ALTSGPT 41 04/23/2021 11:15 AM    TBILIRUBIN 0.8 04/23/2021 11:15 AM    ALBUMIN 4.9 04/23/2021 11:15 AM    TOTPROTEIN 7.9 04/23/2021 11:15 AM    GLOBULIN 3.0 04/23/2021 11:15 AM    AGRATIO 1.6 04/23/2021 11:15 AM       Lab Results   Component Value Date/Time    CHOLSTRLTOT 166 01/11/2021 1029    TRIGLYCERIDE 135 01/11/2021 1029    HDL 55 01/11/2021 1029    LDL 84 01/11/2021 1029       Lab Results   Component Value Date/Time    TSHULTRASEN 0.608 01/11/2021 1029     Lab Results   Component Value Date/Time    FREET4 1.17 02/19/2021 1423     No results found for: FREET3  No results found for: THYSTIMIG    Lab Results   Component Value Date/Time    MICROSOMALA 97.0 (H) 01/11/2021 1029         Imaging:      ASSESSMENT/PLAN:     1. Hashimoto's thyroiditis  Reviewed the pathogenesis of Hashimoto's thyroiditis and overview of therapy.  Explained to patient because she has normal TSH levels and normal thyroid function thyroid hormone replacement therapy is not indicated as it may even cause more harm than good  I am going to repeat her TSH and free T4 today to help reassure that her thyroid function is still normal  If her TSH is elevated I will start her on thyroid hormone replacement therapy  If her TSH is normal I recommend observation  Explained to patient that repeating TPO antibodies is not indicated as it is just used as a biomarker for diagnosis  I will see her again in 4 months      2.  Hypercalcemia  Unstable she has high normal calcium levels  Explained patient that hypercalcemia can cause fatigue.  Recommend repeating labs and checking her calcium vitamin D intact PTH albumin serum creatinine ionized calcium and phosphorus levels today  I will update her    3. Non-toxic multinodular goiter  Stable  She has spongiform benign-appearing thyroid nodules after independent review of ultrasound images  Biopsy is not indicated as they appear to be low risk  Recommend observation and repeating ultrasound again after 6 to 12 months    4. Osteopenia, unspecified location  She has osteopenia and her fracture risk is not significantly elevated  Her calcium is slightly elevated so I am conducting a work-up for this I am also checking an SPEP and UPEP to rule out multiple myeloma    5. Fatigue, unspecified type  She has unexplained fatigue not accounted for by her normal TSH levels  Explained to patient her TPO antibodies are just a biomarker  We will check iron levels today along with vitamin D levels      Return in about 4 months (around 8/27/2021).    Total time spent on date of service was over 60 minutes which included history, physical examination, independent review of ultrasound, independent review of previous blood work, review of bone density, counseling coordination of care ordering labs and scheduling follow-up    Thank you kindly for allowing me to participate in the thyroid care plan for this patient.    Gilberto Polk MD, [facility], [facility]  04/27/21    CC:   Tre Britton M.D.

## 2021-04-28 LAB
ALBUMIN SERPL BCP-MCNC: 4.5 G/DL (ref 3.2–4.9)
ALBUMIN/GLOB SERPL: 1.8 G/DL
ALP SERPL-CCNC: 74 U/L (ref 30–99)
ALT SERPL-CCNC: 39 U/L (ref 2–50)
ANION GAP SERPL CALC-SCNC: 11 MMOL/L (ref 7–16)
AST SERPL-CCNC: 21 U/L (ref 12–45)
BASOPHILS # BLD AUTO: 0.4 % (ref 0–1.8)
BASOPHILS # BLD: 0.06 K/UL (ref 0–0.12)
BILIRUB SERPL-MCNC: 0.4 MG/DL (ref 0.1–1.5)
BUN SERPL-MCNC: 23 MG/DL (ref 8–22)
CA-I SERPL-SCNC: 1.3 MMOL/L (ref 1.1–1.3)
CALCIUM SERPL-MCNC: 10.1 MG/DL (ref 8.5–10.5)
CHLORIDE SERPL-SCNC: 105 MMOL/L (ref 96–112)
CO2 SERPL-SCNC: 24 MMOL/L (ref 20–33)
CREAT SERPL-MCNC: 1.14 MG/DL (ref 0.5–1.4)
EOSINOPHIL # BLD AUTO: 0.04 K/UL (ref 0–0.51)
EOSINOPHIL NFR BLD: 0.2 % (ref 0–6.9)
ERYTHROCYTE [DISTWIDTH] IN BLOOD BY AUTOMATED COUNT: 40.3 FL (ref 35.9–50)
FERRITIN SERPL-MCNC: 117 NG/ML (ref 10–291)
GLOBULIN SER CALC-MCNC: 2.5 G/DL (ref 1.9–3.5)
GLUCOSE SERPL-MCNC: 145 MG/DL (ref 65–99)
HCT VFR BLD AUTO: 45.7 % (ref 37–47)
HGB BLD-MCNC: 15.1 G/DL (ref 12–16)
IMM GRANULOCYTES # BLD AUTO: 0.09 K/UL (ref 0–0.11)
IMM GRANULOCYTES NFR BLD AUTO: 0.5 % (ref 0–0.9)
IRON SATN MFR SERPL: 26 % (ref 15–55)
IRON SERPL-MCNC: 87 UG/DL (ref 40–170)
LYMPHOCYTES # BLD AUTO: 1.09 K/UL (ref 1–4.8)
LYMPHOCYTES NFR BLD: 6.6 % (ref 22–41)
MCH RBC QN AUTO: 29.5 PG (ref 27–33)
MCHC RBC AUTO-ENTMCNC: 33 G/DL (ref 33.6–35)
MCV RBC AUTO: 89.4 FL (ref 81.4–97.8)
MONOCYTES # BLD AUTO: 0.81 K/UL (ref 0–0.85)
MONOCYTES NFR BLD AUTO: 4.9 % (ref 0–13.4)
NEUTROPHILS # BLD AUTO: 14.45 K/UL (ref 2–7.15)
NEUTROPHILS NFR BLD: 87.4 % (ref 44–72)
NRBC # BLD AUTO: 0 K/UL
NRBC BLD-RTO: 0 /100 WBC
PHOSPHATE SERPL-MCNC: 4 MG/DL (ref 2.5–4.5)
PLATELET # BLD AUTO: 303 K/UL (ref 164–446)
PMV BLD AUTO: 10.6 FL (ref 9–12.9)
POTASSIUM SERPL-SCNC: 3.9 MMOL/L (ref 3.6–5.5)
PROT SERPL-MCNC: 7 G/DL (ref 6–8.2)
PTH-INTACT SERPL-MCNC: 48 PG/ML (ref 14–72)
RBC # BLD AUTO: 5.11 M/UL (ref 4.2–5.4)
SODIUM SERPL-SCNC: 140 MMOL/L (ref 135–145)
T4 FREE SERPL-MCNC: 1.12 NG/DL (ref 0.93–1.7)
TIBC SERPL-MCNC: 329 UG/DL (ref 250–450)
TSH SERPL DL<=0.005 MIU/L-ACNC: 0.46 UIU/ML (ref 0.38–5.33)
UIBC SERPL-MCNC: 242 UG/DL (ref 110–370)
VIT B12 SERPL-MCNC: 449 PG/ML (ref 211–911)
WBC # BLD AUTO: 16.5 K/UL (ref 4.8–10.8)

## 2021-04-29 LAB — 25(OH)D3 SERPL-MCNC: 43 NG/ML (ref 30–80)

## 2021-05-03 LAB
ALBUMIN 24H MFR UR ELPH: 54 %
ALBUMIN SERPL ELPH-MCNC: 4.28 G/DL (ref 3.75–5.01)
ALPHA1 GLOB 24H MFR UR ELPH: 14.9 %
ALPHA1 GLOB SERPL ELPH-MCNC: 0.38 G/DL (ref 0.19–0.46)
ALPHA2 GLOB 24H MFR UR ELPH: 14.1 %
ALPHA2 GLOB SERPL ELPH-MCNC: 0.7 G/DL (ref 0.48–1.05)
B-GLOBULIN 24H MFR UR ELPH: 12.7 %
B-GLOBULIN SERPL ELPH-MCNC: 0.85 G/DL (ref 0.48–1.1)
COLLECT DURATION TIME SPEC: NORMAL HRS
EER MONOCLONAL PROTEIN STUDY, 24 HOUR U Q5964: NORMAL
GAMMA GLOB 24H MFR UR ELPH: 4.3 %
GAMMA GLOB SERPL ELPH-MCNC: 0.8 G/DL (ref 0.62–1.51)
INTERPRETATION SERPL IFE-IMP: NORMAL
INTERPRETATION UR IFE-IMP: NORMAL
M PROTEIN 24H MFR UR ELPH: 0 %
M PROTEIN 24H UR ELPH-MRATE: NORMAL MG/24 HRS
MONOCLON BAND OBS SERPL: NORMAL
PATHOLOGY STUDY: NORMAL
PROT 24H UR-MRATE: NORMAL MG/D (ref 40–150)
PROT SERPL-MCNC: 7 G/DL (ref 6.3–8.2)
PROT UR-MCNC: 18 MG/DL
SPECIMEN VOL ?TM UR: NORMAL ML

## 2021-05-13 ENCOUNTER — TELEPHONE (OUTPATIENT)
Dept: MEDICAL GROUP | Facility: LAB | Age: 71
End: 2021-05-13

## 2021-05-13 NOTE — TELEPHONE ENCOUNTER
ESTABLISHED PATIENT PRE-VISIT PLANNING     Patient was NOT contacted to complete PVP.     Note: Patient will not be contacted if there is no indication to call.     1.  Reviewed notes from the last few office visits within the medical group: Yes    2.  If any orders were placed at last visit or intended to be done for this visit (i.e. 6 mos follow-up), do we have Results/Consult Notes?         •  Labs - Labs were not ordered at last office visit.  Note: If patient appointment is for lab review and patient did not complete labs, check with provider if OK to reschedule patient until labs completed.       •  Imaging - Imaging ordered, NOT completed. Patient advised to complete prior to next appointment. scheduled        •  Referrals - Referral ordered, patient has NOT been seen. scheduled     3. Is this appointment scheduled as a Hospital Follow-Up? No    4.  Immunizations were updated in Epic using Reconcile Outside Information activity? Yes    5.  Patient is due for the following Health Maintenance Topics:   Health Maintenance Due   Topic Date Due   • IMM ZOSTER VACCINES (2 of 3) 12/25/2017   • IMM DTaP/Tdap/Td Vaccine (2 - Td) 02/17/2021   • Annual Wellness Visit  02/19/2021   • MAMMOGRAM  06/12/2021     6.  AHA (Pulse8) form printed for Provider? No, patient does not have any open alerts

## 2021-05-20 ENCOUNTER — OFFICE VISIT (OUTPATIENT)
Dept: MEDICAL GROUP | Facility: LAB | Age: 71
End: 2021-05-20
Payer: MEDICARE

## 2021-05-20 VITALS
RESPIRATION RATE: 16 BRPM | TEMPERATURE: 98.2 F | WEIGHT: 172 LBS | DIASTOLIC BLOOD PRESSURE: 70 MMHG | HEIGHT: 65 IN | OXYGEN SATURATION: 96 % | BODY MASS INDEX: 28.66 KG/M2 | SYSTOLIC BLOOD PRESSURE: 120 MMHG | HEART RATE: 100 BPM

## 2021-05-20 DIAGNOSIS — E06.3 HASHIMOTO'S THYROIDITIS: ICD-10-CM

## 2021-05-20 DIAGNOSIS — R06.02 SOB (SHORTNESS OF BREATH): ICD-10-CM

## 2021-05-20 DIAGNOSIS — R53.83 OTHER FATIGUE: ICD-10-CM

## 2021-05-20 DIAGNOSIS — I10 ESSENTIAL HYPERTENSION: ICD-10-CM

## 2021-05-20 DIAGNOSIS — Z00.00 HEALTH CARE MAINTENANCE: ICD-10-CM

## 2021-05-20 PROCEDURE — 99214 OFFICE O/P EST MOD 30 MIN: CPT | Performed by: FAMILY MEDICINE

## 2021-05-20 ASSESSMENT — FIBROSIS 4 INDEX: FIB4 SCORE: 0.77

## 2021-05-20 NOTE — PROGRESS NOTES
Chief Complaint:   Chief Complaint   Patient presents with   • Follow-Up       HPI: Established patient  Lauren Jiang is a 70 y.o. female who presents for routine follow-up, discussed the following today:    1. Health care maintenance  Reviewed and updated today  2. Essential hypertension  Blood pressure within normal limits today 120/70, denies symptoms like headache or chest pain or shortness of breath or lower extremity edema    3. SOB (shortness of breath)  Reports still shortness of breath with exertion, patient already has an appointment to do her echocardiogram and chest x-ray for further evaluation, supposed to call and schedule appointment with cardiology for further evaluation of shortness of breath with exertion.  Denies worsening of symptoms.  She said the symptoms stays the same    4. Hashimoto's thyroiditis  Recently established with endocrinology, records reviewed.  Discussed with the patient today    5. Other fatigue  Still chronic fatigue, for long time no clear etiology for her fatigue.  All her labs are nonsignificant except for chronic decrease in GFR and kidney function test.  Continue denies worsening of her fatigue and tiredness.          Past medical history, family history, social history and medications reviewed and updated in the record. today  Current medications, problem list and allergies reviewed in Epic today  Health maintenance topics are reviewed and updated.    Patient Active Problem List    Diagnosis Date Noted   • CKD (chronic kidney disease) stage 3, GFR 30-59 ml/min ([facility]) 08/16/2017   • History of breast cancer 01/12/2016   • Mixed hyperlipidemia 01/12/2016   • Health care maintenance 05/20/2021   • Hypercalcemia 04/27/2021   • Non-toxic multinodular goiter 04/27/2021   • Uncontrolled daytime somnolence 08/26/2020   • Hashimoto's thyroiditis 07/22/2020   • Malignant neoplasm of upper-outer quadrant of right breast in female, estrogen receptor positive (HCC) 07/10/2020   •  Unexplained night sweats 07/10/2020   • Overweight (BMI 25.0-29.9) 2017   • Chronic fatigue 2016   • Essential hypertension 2016   • Glaucoma 2016   • Osteopenia 2016     Family History   Problem Relation Age of Onset   • Heart Disease Father         quadriple bypass    • Cancer Sister         breast with mets   • Hypertension Mother    • Lung Disease Mother         smoker   • Arthritis Mother         RA   • Stroke Mother    • Thyroid Mother    • Diabetes Maternal Uncle         type I   • Cancer Maternal Grandmother         Colon   • Diabetes Maternal Grandfather         type II   • Arthritis Daughter    • No Known Problems Daughter    • No Known Problems Paternal Grandmother    • No Known Problems Paternal Grandfather      Social History     Socioeconomic History   • Marital status:      Spouse name: Not on file   • Number of children: Not on file   • Years of education: Not on file   • Highest education level: Associate degree: occupational, technical, or vocational program   Occupational History     Comment: Retired LPN   Tobacco Use   • Smoking status: Former Smoker     Packs/day: 0.25     Years: 10.00     Pack years: 2.50     Types: Cigarettes     Quit date: 2000     Years since quittin.3   • Smokeless tobacco: Never Used   Vaping Use   • Vaping Use: Never used   Substance and Sexual Activity   • Alcohol use: Yes     Comment: glass of wine    • Drug use: No   • Sexual activity: Yes     Partners: 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: ; retired LPN   Other Topics Concern   • Not on file   Social History Narrative   • Not on file     Social Determinants of Health     Financial Resource Strain: Low Risk    • Difficulty of Paying Living Expenses: Not hard at all   Food Insecurity: No Food Insecurity   • Worried About Running Out of Food in the Last Year: Never true   • Ran Out of Food in the Last Year: Never true   Transportation Needs: No Transportation Needs   • Lack of  "Transportation (Medical): No   • Lack of Transportation (Non-Medical): No   Physical Activity: Insufficiently Active   • Days of Exercise per Week: 1 day   • Minutes of Exercise per Session: 20 min   Stress: No Stress Concern Present   • Feeling of Stress : Not at all   Social Connections: Moderately Integrated   • Frequency of Communication with Friends and Family: Three times a week   • Frequency of Social Gatherings with Friends and Family: Patient refused   • Attends Judaism Services: More than 4 times per year   • Active Member of Clubs or Organizations: No   • Attends Club or Organization Meetings: Never   • Marital Status:    Intimate Partner Violence:    • Fear of Current or Ex-Partner:    • Emotionally Abused:    • Physically Abused:    • Sexually Abused:        Current Outpatient Medications   Medication Sig Dispense Refill   • amLODIPine (NORVASC) 10 MG Tab Take 1 Tab by mouth every day. 90 Tab 3   • pravastatin (PRAVACHOL) 20 MG Tab TAKE ONE TABLET BY MOUTH AT BEDTIME  100 Tab 3   • vitamin D (CHOLECALCIFEROL) 1000 Unit (25 mcg) Tab Take 1,000 Units by mouth every day.     • letrozole (FEMARA) 2.5 MG Tab Take 2.5 mg by mouth every day. Indications: Cancer of the Breast     • calcium carbonate (OS-OMERO 500) 1250 MG Tab Take 1,250 mg by mouth 2 times a day, with meals.       No current facility-administered medications for this visit.         Review Of Systems  As documented in HPI above  PHYSICAL EXAMINATION:    /70 (BP Location: Right arm, Patient Position: Sitting, BP Cuff Size: Adult)   Pulse 100   Temp 36.8 °C (98.2 °F) (Temporal)   Resp 16   Ht 1.651 m (5' 5\")   Wt 78 kg (172 lb)   SpO2 96%   BMI 28.62 kg/m²   Gen.: Well-developed, well-nourished, no apparent distress, pleasant and cooperative with the examination  HEENT: Normocephalic/atraumatic,   Neck: No JVD or bruits, no adenopathy  Cor: Regular rate and rhythm without murmur gallop or rub  Lungs: Clear to auscultation with " equal breath sounds bilaterally. No wheezes, rhonchi.  Abdomen: Soft nontender without hepatosplenomegaly or masses appreciated, normoactive bowel sounds  Extremities: No cyanosis, clubbing or edema          ASSESSMENT/Plan:  1. Health care maintenance   reviewed and updated today, will repeat another DEXA bone scan in 1 year   2. Essential hypertension   chronic and well-controlled continue same regimen, amlodipine 10 mg daily   3. SOB (shortness of breath)   chronic problem, still unresolved.  Without red flags.  Patient to follow-up with the cardiology and do all her labs as directed, scheduled for echo and chest x-ray   4. Hashimoto's thyroiditis   chronic, stable with normal thyroid function test, no need for medication at this time we will continue conservative management and follow-up with endocrinology   5. Other fatigue   chronic with unclear etiology, discussed healthy lifestyle, no red flags.  Will monitor kidney function test.       Please note that this dictation was created using voice recognition software. I have made every reasonable attempt to correct obvious errors but there may be errors of grammar and content that I may have overlooked prior to finalization of this note.

## 2021-06-04 ENCOUNTER — APPOINTMENT (OUTPATIENT)
Dept: RADIOLOGY | Facility: MEDICAL CENTER | Age: 71
End: 2021-06-04
Attending: INTERNAL MEDICINE
Payer: MEDICARE

## 2021-06-14 ENCOUNTER — HOSPITAL ENCOUNTER (OUTPATIENT)
Dept: RADIOLOGY | Facility: MEDICAL CENTER | Age: 71
End: 2021-06-14
Attending: INTERNAL MEDICINE
Payer: MEDICARE

## 2021-06-14 DIAGNOSIS — Z12.31 VISIT FOR SCREENING MAMMOGRAM: ICD-10-CM

## 2021-06-14 PROCEDURE — 77063 BREAST TOMOSYNTHESIS BI: CPT | Mod: 52

## 2021-06-18 ENCOUNTER — TELEPHONE (OUTPATIENT)
Dept: CARDIOLOGY | Facility: MEDICAL CENTER | Age: 71
End: 2021-06-18

## 2021-06-18 NOTE — TELEPHONE ENCOUNTER
Called patient in regards to her NP appt with Dr. Weeks on 06/30/2021 at 11:30pm. Pt stated she has not seen a previous Cardiologist in the past other than Luis, imaging/blood work is up to date and within chart.Let her know she will have an EKG at her appointment, and to not wear any lotion or the electrodes won't stick. Confirmed appt time and location, pt verbally understood.

## 2021-06-22 ENCOUNTER — HOSPITAL ENCOUNTER (OUTPATIENT)
Dept: CARDIOLOGY | Facility: MEDICAL CENTER | Age: 71
End: 2021-06-22
Attending: FAMILY MEDICINE
Payer: MEDICARE

## 2021-06-22 ENCOUNTER — HOSPITAL ENCOUNTER (OUTPATIENT)
Dept: RADIOLOGY | Facility: MEDICAL CENTER | Age: 71
End: 2021-06-22
Attending: FAMILY MEDICINE
Payer: MEDICARE

## 2021-06-22 DIAGNOSIS — R06.02 SOB (SHORTNESS OF BREATH) ON EXERTION: ICD-10-CM

## 2021-06-22 LAB
LV EJECT FRACT  99904: 55
LV EJECT FRACT MOD 2C 99903: 53.24
LV EJECT FRACT MOD 4C 99902: 55.37
LV EJECT FRACT MOD BP 99901: 53.44

## 2021-06-22 PROCEDURE — 93306 TTE W/DOPPLER COMPLETE: CPT

## 2021-06-22 PROCEDURE — 71046 X-RAY EXAM CHEST 2 VIEWS: CPT

## 2021-06-22 PROCEDURE — 93306 TTE W/DOPPLER COMPLETE: CPT | Mod: 26 | Performed by: INTERNAL MEDICINE

## 2021-06-28 NOTE — PROGRESS NOTES
Subjective:   Chief Complaint:   Chief Complaint   Patient presents with   • Tachycardia   • Shortness of Breath   • Abnormal EKG       Lauren Jiang is a 71 y.o. female who is referred by Dr. Tre Britton for GREGORY, abnormal ECG, tachycardia, HTN, HLP.    Remotely seen by Dr. Yuri Smith, 2017 for atypical CP. Prior breast cancer (right breast total mastectomy, no chemo/rx, treated with Letrozole), and GERD recently (2017) admitted to the hospital for chest pain concerning for angina in the setting of a prior workup that was negative for the same including a cardiac catheterization around , was working for [employer], she was working there.    Has sleep apnea, but did have surgery for sinuses so no mask yet.    Has HLP, on primary prevention statin.    Has HTN, on med.  On amlodipine 10 mg, having lower extremity edema.  Previous given benazepril, cause some kidney dysfunction.    Hyperactive thyroid from Hashimoto's at this time.    CKD3a.    Former smoker, quit around .    Noting fatigue and mild GREGORY.  Has some LE edema, no orthopnea.    Lightheaded at times, turning her head or after bending over.  Passes quickly.  No syncope.    Does not activity ramps up her heart rate but not christen palpitations    Recently mechanical fall, ribs are sore.    She is not limited by chest pain, pressure or tightness with activity.   No symptoms of leg claudication.   No stroke/TIA like symptoms.    Mother had CVA in 70s.  Father had CABG in 60s,  at 66 in his sleep.  No history of diabetes.  No ETOH overuse. 1-2 per day.  No caffeine overuse. 3-4 per day.  No recreation substance use.  No hx asthma.    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  Lives in [place].   to [facility].  From [place], [place], moved to [place] with family.    DATA REVIEWED by me:  ECG (my personal interpretation) 21  Sinus, 87, rsr' normal pattern, DRW progression    ECG 2021  Sinus tachycardia, rate  104, baseline wandering    Echo 6-22-21  No prior study is available for comparison.   Normal left ventricular chamber size.  Left ventricular ejection fraction is visually estimated to be 55%.  Indeterminate diastolic function.  Mild mitral regurgitation.  Estimated right ventricular systolic pressure  is 40 mmHg.  Normal inferior vena cava size and inspiratory collapse.    Myocardial perfusion imaging, 8/16/2017:   Negative stress ECG for ischemia.   No evidence of significant jeopardized viable myocardium or prior myocardial    infarction.   Normal myocardial perfusion with no ischemia.   Normal left ventricular size, ejection fraction, and wall motion.   Normal left ventricular wall motion.  LV ejection fraction = 57%.      Most recent labs:       Lab Results   Component Value Date/Time    WBC 16.5 (H) 04/27/2021 01:38 PM    HEMOGLOBIN 15.1 04/27/2021 01:38 PM    HEMATOCRIT 45.7 04/27/2021 01:38 PM    MCV 89.4 04/27/2021 01:38 PM      Lab Results   Component Value Date/Time    SODIUM 140 04/27/2021 01:38 PM    POTASSIUM 3.9 04/27/2021 01:38 PM    CHLORIDE 105 04/27/2021 01:38 PM    CO2 24 04/27/2021 01:38 PM    GLUCOSE 145 (H) 04/27/2021 01:38 PM    BUN 23 (H) 04/27/2021 01:38 PM    CREATININE 1.14 04/27/2021 01:38 PM      Lab Results   Component Value Date/Time    ASTSGOT 21 04/27/2021 01:38 PM    ALTSGPT 39 04/27/2021 01:38 PM    ALBUMIN 4.28 04/27/2021 01:38 PM    ALBUMIN 4.5 04/27/2021 01:38 PM      Lab Results   Component Value Date/Time    CHOLSTRLTOT 166 01/11/2021 10:29 AM    LDL 84 01/11/2021 10:29 AM    HDL 55 01/11/2021 10:29 AM    TRIGLYCERIDE 135 01/11/2021 10:29 AM     No results for input(s): NTPROBNP, TROPONINT in the last 72 hours.      Past Medical History:   Diagnosis Date   • Cancer (HCC)     Breast; dx 2013   • Cataract    • CKD (chronic kidney disease) stage 3, GFR 30-59 ml/min (HCC) 8/16/2017   • Gallstones    • Glaucoma     right eye   • Hashimoto's thyroiditis 7/22/2020   •  Hyperlipidemia    • Hyperlipidemia LDL goal <100 2016   • Hypertension    • Malignant neoplasm of upper-outer quadrant of right breast in female, estrogen receptor positive (HCC) 7/10/2020    Complete mastectomy in  with Dr. Garg.  Had Dr. Jay who recommended Femara for total of 10 yeas.   • Uncontrolled daytime somnolence 2020   • Unexplained night sweats 7/10/2020     Past Surgical History:   Procedure Laterality Date   • HYSTERECTOMY LAPAROSCOPY  53 y/o    cervix and bilat ovaries removed.   • KNEE ARTHROSCOPY  60 y/o   • KNEE ARTHROSCOPY Right    • MASTECTOMY  age 62    right mastectomy , 9 years ago   • TONSILLECTOMY     • TONSILLECTOMY AND ADENOIDECTOMY  7 y/o     Family History   Problem Relation Age of Onset   • Heart Disease Father         quadriple bypass    • Cancer Sister         breast with mets   • Hypertension Mother    • Lung Disease Mother         smoker   • Arthritis Mother         RA   • Stroke Mother    • Thyroid Mother    • Diabetes Maternal Uncle         type I   • Cancer Maternal Grandmother         Colon   • Diabetes Maternal Grandfather         type II   • Arthritis Daughter    • No Known Problems Daughter    • No Known Problems Paternal Grandmother    • No Known Problems Paternal Grandfather      Social History     Socioeconomic History   • Marital status:      Spouse name: Not on file   • Number of children: Not on file   • Years of education: Not on file   • Highest education level: Associate degree: occupational, technical, or vocational program   Occupational History     Comment: Retired LPN   Tobacco Use   • Smoking status: Former Smoker     Packs/day: 0.25     Years: 10.00     Pack years: 2.50     Types: Cigarettes     Quit date: 2000     Years since quittin.5   • Smokeless tobacco: Never Used   Vaping Use   • Vaping Use: Never used   Substance and Sexual Activity   • Alcohol use: Yes     Comment: glass of wine    • Drug use: No   • Sexual activity:  Yes     Partners: 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: ; retired LPN   Other Topics Concern   • Not on file   Social History Narrative   • Not on file     Social Determinants of Health     Financial Resource Strain: Low Risk    • Difficulty of Paying Living Expenses: Not hard at all   Food Insecurity: No Food Insecurity   • Worried About Running Out of Food in the Last Year: Never true   • Ran Out of Food in the Last Year: Never true   Transportation Needs: No Transportation Needs   • Lack of Transportation (Medical): No   • Lack of Transportation (Non-Medical): No   Physical Activity: Insufficiently Active   • Days of Exercise per Week: 1 day   • Minutes of Exercise per Session: 20 min   Stress: No Stress Concern Present   • Feeling of Stress : Not at all   Social Connections: Moderately Integrated   • Frequency of Communication with Friends and Family: Three times a week   • Frequency of Social Gatherings with Friends and Family: Patient refused   • Attends Shinto Services: More than 4 times per year   • Active Member of Clubs or Organizations: No   • Attends Club or Organization Meetings: Never   • Marital Status:    Intimate Partner Violence:    • Fear of Current or Ex-Partner:    • Emotionally Abused:    • Physically Abused:    • Sexually Abused:      No Known Allergies    Current Outpatient Medications   Medication Sig Dispense Refill   • amLODIPine (NORVASC) 5 MG Tab Take 1 tablet by mouth every day. 90 tablet 3   • metoprolol tartrate (LOPRESSOR) 25 MG Tab Take 0.5 Tablets by mouth 2 times a day. 50 tablet 3   • pravastatin (PRAVACHOL) 20 MG Tab TAKE ONE TABLET BY MOUTH AT BEDTIME  100 Tab 3   • vitamin D (CHOLECALCIFEROL) 1000 Unit (25 mcg) Tab Take 1,000 Units by mouth every day.     • letrozole (FEMARA) 2.5 MG Tab Take 2.5 mg by mouth every day. Indications: Cancer of the Breast     • calcium carbonate (OS-OMERO 500) 1250 MG Tab Take 1,250 mg by mouth 2 times a day, with meals.       No current  "facility-administered medications for this visit.       ROS  All others systems reviewed and negative.     Objective:     /80 (BP Location: Left arm, Patient Position: Sitting, BP Cuff Size: Adult)   Pulse 98   Ht 1.651 m (5' 5\")   Wt 80.7 kg (178 lb)   SpO2 97%  Body mass index is 29.62 kg/m².    General: No acute distress. Well nourished.  HEENT: EOM grossly intact, no scleral icterus, no pharyngeal erythema.   Neck:  No JVD, no bruits, trachea midline  CVS: RRR. Normal S1, S2. No M/R/G. No LE edema.  2+ radial pulses, 2+ PT pulses  Resp: CTAB. No wheezing or crackles/rhonchi. Normal respiratory effort.  Abdomen: Soft, NT, no christen hepatomegaly.  MSK/Ext: No clubbing or cyanosis.  Skin: Warm and dry, no rashes.  Neurological: CN III-XII grossly intact. No focal deficits.   Psych: A&O x 3, appropriate affect, good judgement      Assessment:     1. SOB (shortness of breath) on exertion  RIH Treadmill Stress   2. History of breast cancer     3. Essential hypertension     4. Mixed hyperlipidemia     5. Stage 3a chronic kidney disease (HCC)     6. Overweight (BMI 25.0-29.9)     7. Malignant neoplasm of upper-outer quadrant of right breast in female, estrogen receptor positive (HCC)     8. Hypercalcemia     9. Nonspecific abnormal electrocardiogram (ECG) (EKG)     10. Lower extremity edema     11. Tachycardia  RIH Treadmill Stress       Medical Decision Making:  Today's Assessment / Status / Plan:     -Hashimotos hyperactive thyroid and caffeine can lead to fast HR, there is nothing else wrong with her EKG, no old heart attack.  -Primary prevention statin  -Blood pressure controlled, but lower extremity edema as a side effect so we going to cut this in half and add the beta-blocker  -See below about starting metoprolol  -Benazepril and losartan are good medications, I would not be concerned at all about the expected small bump in creatinine which is part of the pharmacokinetics of the medication  -Treadmill to " look at her physiology with exercise.  -Depending on treadmill, consider heart monitor  -Echo  -return to clinic        Written instructions given today:      -Treadmill stress test, I am looking at your heart rate and blood pressure response when you get short of breath.  Do not take your beta-blocker the night before the test or the morning of the test.    Checking Blood Pressure:  -Blood pressure cuff, spend in the $40-65, with good return policy  -It should be automatic, upper arm, measure your arm to get the correct size, probably adult Large but your arm should be under 16.5 cm. If you need an XL cuff, you will have to have it special ordered from a pharmacy or durable medical equipment company.  -Put the cuff in place, rest arm on table near height of your heart, sit quietly for 5 min, legs uncrossed, with back support, then take your blood pressure, write it down, keep a log  -Check no more than 1 time day, maybe 4-5 times per week, try different times of day.  -Can bring your cuff to at least one appointment where it can be calibrated to a manual cuff if you are concerned.  -Goal blood pressure is at least under 130/80, ideally under 120/80.  If you think your BP is overall too high, let us know in the office, we can adjust medications, can use [employer] or call the [facility] office: [phone].    Salt=sodium=sea salt, guidelines say stay under 2,500 mg daily but I ask for under 4,000 mg daily.  Get salt smart, start looking at labels, count it up.  Salt is hidden in everything, salad dressing, sauces, cheese, most canned food, any processed meat.    Caffeine is a reversible cause of high blood pressure.  The more you can cut down or switch to decaf the better in terms of blood pressure.    Nasal decongestants can cause high blood pressure.    Untreated sleep apnea is a reversible cause of high blood pressure.    -I may ask you to wear heart monitor after I see your treadmill stress test.    -Some people's  heart rates run inappropriately fast and the best way to treat this is to blunted the heart rate response with a beta-blocker.    -I consider your blood pressure to be controlled today but your amlodipine dose should be reduced to 551 to get rid of the ankle swelling in which case your blood pressure is going to go up and we need a second medication to keep you under control.    -It is better to have multiple medications at lower doses you are less likely to get a side effect.    -I consider benazepril and those medications to be good medications and I am not concerned about the effect it has on your kidney testing because it actually protects your kidneys through that same mechanism.    -Metoprolol tartrate 25 mg tablet, start at 12.5 mg AM and PM, just in half.  This is a beta-blocker.  It can blunt the heart rate response and to help people with fast heart rates feel better.  If you feel worse for any reason, stop the medication and let me know.  I am starting you on a very low dose.  If you do not notice significant improvement in heart racing then increase to a full tablet 25 mg a.m. and p.m.  Maximum dose is 100 mg a.m. and p.m.    -Beta-blockers can cause fatigue and slower heart rates but her theory is that your faster heart rate may be contributing to your fatigue and exercise intolerance.  I will know more when I see your stress test.  Beta-blockers can also blunt the response to hyperthyroidism.    -Use [employer] to report back if you are having side effects from medications or if you notice that your blood pressure is no longer controlled.    -See Agnes, our nurse practitioner in about 4 weeks after the treadmill, she can go over the treadmill results, see how you are doing off of the higher dose of amlodipine and how things are going with a beta-blocker.  She can also order a heart monitor if need be.    Return in about 4 weeks (around 7/28/2021).    It is my pleasure to participate in the care of Ms.  Apolinar.  Please do not hesitate to contact me with questions or concerns.    Kezia Weeks MD, [facility]  Cardiologist [facility]    Please note that this dictation was created using voice recognition software. I have made every reasonable attempt to correct obvious errors, but it is possible there are errors of grammar and possibly content that I did not discover before finalizing the note.

## 2021-06-30 ENCOUNTER — OFFICE VISIT (OUTPATIENT)
Dept: CARDIOLOGY | Facility: PHYSICIAN GROUP | Age: 71
End: 2021-06-30
Attending: FAMILY MEDICINE
Payer: MEDICARE

## 2021-06-30 VITALS
OXYGEN SATURATION: 97 % | SYSTOLIC BLOOD PRESSURE: 122 MMHG | DIASTOLIC BLOOD PRESSURE: 80 MMHG | BODY MASS INDEX: 29.66 KG/M2 | HEIGHT: 65 IN | HEART RATE: 98 BPM | WEIGHT: 178 LBS

## 2021-06-30 DIAGNOSIS — Z85.3 HISTORY OF BREAST CANCER: ICD-10-CM

## 2021-06-30 DIAGNOSIS — R60.0 LOWER EXTREMITY EDEMA: ICD-10-CM

## 2021-06-30 DIAGNOSIS — Z17.0 MALIGNANT NEOPLASM OF UPPER-OUTER QUADRANT OF RIGHT BREAST IN FEMALE, ESTROGEN RECEPTOR POSITIVE (HCC): ICD-10-CM

## 2021-06-30 DIAGNOSIS — R06.02 SOB (SHORTNESS OF BREATH) ON EXERTION: ICD-10-CM

## 2021-06-30 DIAGNOSIS — I10 ESSENTIAL HYPERTENSION: ICD-10-CM

## 2021-06-30 DIAGNOSIS — R94.31 NONSPECIFIC ABNORMAL ELECTROCARDIOGRAM (ECG) (EKG): ICD-10-CM

## 2021-06-30 DIAGNOSIS — N18.31 STAGE 3A CHRONIC KIDNEY DISEASE: ICD-10-CM

## 2021-06-30 DIAGNOSIS — E66.3 OVERWEIGHT (BMI 25.0-29.9): ICD-10-CM

## 2021-06-30 DIAGNOSIS — R00.0 TACHYCARDIA: ICD-10-CM

## 2021-06-30 DIAGNOSIS — E78.2 MIXED HYPERLIPIDEMIA: ICD-10-CM

## 2021-06-30 DIAGNOSIS — E83.52 HYPERCALCEMIA: ICD-10-CM

## 2021-06-30 DIAGNOSIS — C50.411 MALIGNANT NEOPLASM OF UPPER-OUTER QUADRANT OF RIGHT BREAST IN FEMALE, ESTROGEN RECEPTOR POSITIVE (HCC): ICD-10-CM

## 2021-06-30 PROCEDURE — 99204 OFFICE O/P NEW MOD 45 MIN: CPT | Performed by: INTERNAL MEDICINE

## 2021-06-30 RX ORDER — AMLODIPINE BESYLATE 5 MG/1
5 TABLET ORAL DAILY
Qty: 90 TABLET | Refills: 3 | Status: SHIPPED | OUTPATIENT
Start: 2021-06-30 | End: 2022-06-14

## 2021-06-30 RX ORDER — ALPRAZOLAM 1 MG/1
TABLET ORAL
COMMUNITY
Start: 2021-03-17 | End: 2021-06-30

## 2021-06-30 ASSESSMENT — FIBROSIS 4 INDEX: FIB4 SCORE: 0.78

## 2021-06-30 NOTE — LETTER
[facility]   [address] [place] [address]  Phone: [phone]  Fax: [phone]              Lauren Jiang  1950    Encounter Date: 2021    Kezia Weeks M.D.          PROGRESS NOTE:  Subjective:   Chief Complaint:   Chief Complaint   Patient presents with   • Tachycardia   • Shortness of Breath   • Abnormal EKG       Lauren Jiang is a 71 y.o. female who is referred by Dr. Tre Britton for GREGORY, abnormal ECG, tachycardia, HTN, HLP.    Remotely seen by Dr. Yuri Smith, 2017 for atypical CP. Prior breast cancer (right breast total mastectomy, no chemo/rx, treated with Letrozole), and GERD recently (2017) admitted to the hospital for chest pain concerning for angina in the setting of a prior workup that was negative for the same including a cardiac catheterization around , was working for [employer], she was working there.    Has sleep apnea, but did have surgery for sinuses so no mask yet.    Has HLP, on primary prevention statin.    Has HTN, on med.  On amlodipine 10 mg, having lower extremity edema.  Previous given benazepril, cause some kidney dysfunction.    Hyperactive thyroid from Hashimoto's at this time.    CKD3a.    Former smoker, quit around .    Noting fatigue and mild GREGORY.  Has some LE edema, no orthopnea.    Lightheaded at times, turning her head or after bending over.  Passes quickly.  No syncope.    Does not activity ramps up her heart rate but not christen palpitations    Recently mechanical fall, ribs are sore.    She is not limited by chest pain, pressure or tightness with activity.   No symptoms of leg claudication.   No stroke/TIA like symptoms.    Mother had CVA in 70s.  Father had CABG in 60s,  at 66 in his sleep.  No history of diabetes.  No ETOH overuse. 1-2 per day.  No caffeine overuse. 3-4 per day.  No recreation substance use.  No hx asthma.    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  Lives in [place].   to [place].  From [place], [place], moved to [place] with family.    DATA REVIEWED by me:  ECG (my personal interpretation) 6-30-21  Sinus, 87, rsr' normal pattern, DRW progression    ECG 4/20/2021  Sinus tachycardia, rate 104, baseline wandering    Echo 6-22-21  No prior study is available for comparison.   Normal left ventricular chamber size.  Left ventricular ejection fraction is visually estimated to be 55%.  Indeterminate diastolic function.  Mild mitral regurgitation.  Estimated right ventricular systolic pressure  is 40 mmHg.  Normal inferior vena cava size and inspiratory collapse.    Myocardial perfusion imaging, 8/16/2017:   Negative stress ECG for ischemia.   No evidence of significant jeopardized viable myocardium or prior myocardial    infarction.   Normal myocardial perfusion with no ischemia.   Normal left ventricular size, ejection fraction, and wall motion.   Normal left ventricular wall motion.  LV ejection fraction = 57%.      Most recent labs:       Lab Results   Component Value Date/Time    WBC 16.5 (H) 04/27/2021 01:38 PM    HEMOGLOBIN 15.1 04/27/2021 01:38 PM    HEMATOCRIT 45.7 04/27/2021 01:38 PM    MCV 89.4 04/27/2021 01:38 PM      Lab Results   Component Value Date/Time    SODIUM 140 04/27/2021 01:38 PM    POTASSIUM 3.9 04/27/2021 01:38 PM    CHLORIDE 105 04/27/2021 01:38 PM    CO2 24 04/27/2021 01:38 PM    GLUCOSE 145 (H) 04/27/2021 01:38 PM    BUN 23 (H) 04/27/2021 01:38 PM    CREATININE 1.14 04/27/2021 01:38 PM      Lab Results   Component Value Date/Time    ASTSGOT 21 04/27/2021 01:38 PM    ALTSGPT 39 04/27/2021 01:38 PM    ALBUMIN 4.28 04/27/2021 01:38 PM    ALBUMIN 4.5 04/27/2021 01:38 PM      Lab Results   Component Value Date/Time    CHOLSTRLTOT 166 01/11/2021 10:29 AM    LDL 84 01/11/2021 10:29 AM    HDL 55 01/11/2021 10:29 AM    TRIGLYCERIDE 135 01/11/2021 10:29 AM     No results for input(s): NTPROBNP, TROPONINT in the  last 72 hours.      Past Medical History:   Diagnosis Date   • Cancer (HCC)     Breast; dx 2013   • Cataract    • CKD (chronic kidney disease) stage 3, GFR 30-59 ml/min (HCC) 8/16/2017   • Gallstones    • Glaucoma     right eye   • Hashimoto's thyroiditis 7/22/2020   • Hyperlipidemia    • Hyperlipidemia LDL goal <100 1/12/2016   • Hypertension    • Malignant neoplasm of upper-outer quadrant of right breast in female, estrogen receptor positive (HCC) 7/10/2020    Complete mastectomy in 2013 with Dr. Garg.  Had Dr. Jay who recommended Femara for total of 10 yeas.   • Uncontrolled daytime somnolence 8/26/2020   • Unexplained night sweats 7/10/2020     Past Surgical History:   Procedure Laterality Date   • HYSTERECTOMY LAPAROSCOPY  53 y/o    cervix and bilat ovaries removed.   • KNEE ARTHROSCOPY  58 y/o   • KNEE ARTHROSCOPY Right    • MASTECTOMY  age 62    right mastectomy , 9 years ago   • TONSILLECTOMY     • TONSILLECTOMY AND ADENOIDECTOMY  7 y/o     Family History   Problem Relation Age of Onset   • Heart Disease Father         quadriple bypass    • Cancer Sister         breast with mets   • Hypertension Mother    • Lung Disease Mother         smoker   • Arthritis Mother         RA   • Stroke Mother    • Thyroid Mother    • Diabetes Maternal Uncle         type I   • Cancer Maternal Grandmother         Colon   • Diabetes Maternal Grandfather         type II   • Arthritis Daughter    • No Known Problems Daughter    • No Known Problems Paternal Grandmother    • No Known Problems Paternal Grandfather      Social History     Socioeconomic History   • Marital status:      Spouse name: Not on file   • Number of children: Not on file   • Years of education: Not on file   • Highest education level: Associate degree: occupational, technical, or vocational program   Occupational History     Comment: Retired LPN   Tobacco Use   • Smoking status: Former Smoker     Packs/day: 0.25     Years: 10.00     Pack years: 2.50       Types: Cigarettes     Quit date: 2000     Years since quittin.5   • Smokeless tobacco: Never Used   Vaping Use   • Vaping Use: Never used   Substance and Sexual Activity   • Alcohol use: Yes     Comment: glass of wine    • Drug use: No   • Sexual activity: Yes     Partners: 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: ; retired LPN   Other Topics Concern   • Not on file   Social History Narrative   • Not on file     Social Determinants of Health     Financial Resource Strain: Low Risk    • Difficulty of Paying Living Expenses: Not hard at all   Food Insecurity: No Food Insecurity   • Worried About Running Out of Food in the Last Year: Never true   • Ran Out of Food in the Last Year: Never true   Transportation Needs: No Transportation Needs   • Lack of Transportation (Medical): No   • Lack of Transportation (Non-Medical): No   Physical Activity: Insufficiently Active   • Days of Exercise per Week: 1 day   • Minutes of Exercise per Session: 20 min   Stress: No Stress Concern Present   • Feeling of Stress : Not at all   Social Connections: Moderately Integrated   • Frequency of Communication with Friends and Family: Three times a week   • Frequency of Social Gatherings with Friends and Family: Patient refused   • Attends Hoahaoism Services: More than 4 times per year   • Active Member of Clubs or Organizations: No   • Attends Club or Organization Meetings: Never   • Marital Status:    Intimate Partner Violence:    • Fear of Current or Ex-Partner:    • Emotionally Abused:    • Physically Abused:    • Sexually Abused:      No Known Allergies    Current Outpatient Medications   Medication Sig Dispense Refill   • amLODIPine (NORVASC) 5 MG Tab Take 1 tablet by mouth every day. 90 tablet 3   • metoprolol tartrate (LOPRESSOR) 25 MG Tab Take 0.5 Tablets by mouth 2 times a day. 50 tablet 3   • pravastatin (PRAVACHOL) 20 MG Tab TAKE ONE TABLET BY MOUTH AT BEDTIME  100 Tab 3   • vitamin D (CHOLECALCIFEROL) 1000 Unit (25 mcg) Tab  "Take 1,000 Units by mouth every day.     • letrozole (FEMARA) 2.5 MG Tab Take 2.5 mg by mouth every day. Indications: Cancer of the Breast     • calcium carbonate (OS-OMERO 500) 1250 MG Tab Take 1,250 mg by mouth 2 times a day, with meals.       No current facility-administered medications for this visit.       ROS  All others systems reviewed and negative.     Objective:     /80 (BP Location: Left arm, Patient Position: Sitting, BP Cuff Size: Adult)   Pulse 98   Ht 1.651 m (5' 5\")   Wt 80.7 kg (178 lb)   SpO2 97%  Body mass index is 29.62 kg/m².    General: No acute distress. Well nourished.  HEENT: EOM grossly intact, no scleral icterus, no pharyngeal erythema.   Neck:  No JVD, no bruits, trachea midline  CVS: RRR. Normal S1, S2. No M/R/G. No LE edema.  2+ radial pulses, 2+ PT pulses  Resp: CTAB. No wheezing or crackles/rhonchi. Normal respiratory effort.  Abdomen: Soft, NT, no christen hepatomegaly.  MSK/Ext: No clubbing or cyanosis.  Skin: Warm and dry, no rashes.  Neurological: CN III-XII grossly intact. No focal deficits.   Psych: A&O x 3, appropriate affect, good judgement      Assessment:     1. SOB (shortness of breath) on exertion  RIH Treadmill Stress   2. History of breast cancer     3. Essential hypertension     4. Mixed hyperlipidemia     5. Stage 3a chronic kidney disease (HCC)     6. Overweight (BMI 25.0-29.9)     7. Malignant neoplasm of upper-outer quadrant of right breast in female, estrogen receptor positive (HCC)     8. Hypercalcemia     9. Nonspecific abnormal electrocardiogram (ECG) (EKG)     10. Lower extremity edema     11. Tachycardia  RIH Treadmill Stress       Medical Decision Making:  Today's Assessment / Status / Plan:     -Hashimotos hyperactive thyroid and caffeine can lead to fast HR, there is nothing else wrong with her EKG, no old heart attack.  -Primary prevention statin  -Blood pressure controlled, but lower extremity edema as a side effect so we going to cut this in half " and add the beta-blocker  -See below about starting metoprolol  -Benazepril and losartan are good medications, I would not be concerned at all about the expected small bump in creatinine which is part of the pharmacokinetics of the medication  -Treadmill to look at her physiology with exercise.  -Depending on treadmill, consider heart monitor  -Echo  -return to clinic        Written instructions given today:      -Treadmill stress test, I am looking at your heart rate and blood pressure response when you get short of breath.  Do not take your beta-blocker the night before the test or the morning of the test.    Checking Blood Pressure:  -Blood pressure cuff, spend in the $40-65, with good return policy  -It should be automatic, upper arm, measure your arm to get the correct size, probably adult Large but your arm should be under 16.5 cm. If you need an XL cuff, you will have to have it special ordered from a pharmacy or durable medical equipment company.  -Put the cuff in place, rest arm on table near height of your heart, sit quietly for 5 min, legs uncrossed, with back support, then take your blood pressure, write it down, keep a log  -Check no more than 1 time day, maybe 4-5 times per week, try different times of day.  -Can bring your cuff to at least one appointment where it can be calibrated to a manual cuff if you are concerned.  -Goal blood pressure is at least under 130/80, ideally under 120/80.  If you think your BP is overall too high, let us know in the office, we can adjust medications, can use APR or call the [facility] office: [phone].    Salt=sodium=sea salt, guidelines say stay under 2,500 mg daily but I ask for under 4,000 mg daily.  Get salt smart, start looking at labels, count it up.  Salt is hidden in everything, salad dressing, sauces, cheese, most canned food, any processed meat.    Caffeine is a reversible cause of high blood pressure.  The more you can cut down or switch to decaf the  better in terms of blood pressure.    Nasal decongestants can cause high blood pressure.    Untreated sleep apnea is a reversible cause of high blood pressure.    -I may ask you to wear heart monitor after I see your treadmill stress test.    -Some people's heart rates run inappropriately fast and the best way to treat this is to blunted the heart rate response with a beta-blocker.    -I consider your blood pressure to be controlled today but your amlodipine dose should be reduced to 551 to get rid of the ankle swelling in which case your blood pressure is going to go up and we need a second medication to keep you under control.    -It is better to have multiple medications at lower doses you are less likely to get a side effect.    -I consider benazepril and those medications to be good medications and I am not concerned about the effect it has on your kidney testing because it actually protects your kidneys through that same mechanism.    -Metoprolol tartrate 25 mg tablet, start at 12.5 mg AM and PM, just in half.  This is a beta-blocker.  It can blunt the heart rate response and to help people with fast heart rates feel better.  If you feel worse for any reason, stop the medication and let me know.  I am starting you on a very low dose.  If you do not notice significant improvement in heart racing then increase to a full tablet 25 mg a.m. and p.m.  Maximum dose is 100 mg a.m. and p.m.    -Beta-blockers can cause fatigue and slower heart rates but her theory is that your faster heart rate may be contributing to your fatigue and exercise intolerance.  I will know more when I see your stress test.  Beta-blockers can also blunt the response to hyperthyroidism.    -Use 3scale to report back if you are having side effects from medications or if you notice that your blood pressure is no longer controlled.    -See Agnes, our nurse practitioner in about 4 weeks after the treadmill, she can go over the treadmill results,  see how you are doing off of the higher dose of amlodipine and how things are going with a beta-blocker.  She can also order a heart monitor if need be.    Return in about 4 weeks (around 7/28/2021).    It is my pleasure to participate in the care of Ms. Jiang.  Please do not hesitate to contact me with questions or concerns.    Kezia Weeks MD, [facility]  Cardiologist [facility]    Please note that this dictation was created using voice recognition software. I have made every reasonable attempt to correct obvious errors, but it is possible there are errors of grammar and possibly content that I did not discover before finalizing the note.        Tre Britton M.D.  [id number] [place] [address]  Via In Basket

## 2021-06-30 NOTE — LETTER
[facility]   [address] [place] [address]  Phone: [phone]  Fax: [phone]              Lauren Jiang  1950    Encounter Date: 2021    Kezia Weeks M.D.          PROGRESS NOTE:  Subjective:   Chief Complaint:   Chief Complaint   Patient presents with   • Tachycardia   • Shortness of Breath   • Abnormal EKG       Lauren Jiang is a 71 y.o. female who is referred by Dr. Tre Britton for GREGORY, abnormal ECG, tachycardia, HTN, HLP.    Remotely seen by Dr. Yuri Smith, 2017 for atypical CP. Prior breast cancer (right breast total mastectomy, no chemo/rx, treated with Letrozole), and GERD recently (2017) admitted to the hospital for chest pain concerning for angina in the setting of a prior workup that was negative for the same including a cardiac catheterization around , was working for [email or web address], she was working there.    Has sleep apnea, but did have surgery for sinuses so no mask yet.    Has HLP, on primary prevention statin.    Has HTN, on med.  On amlodipine 10 mg, having lower extremity edema.  Previous given benazepril, cause some kidney dysfunction.    Hyperactive thyroid from Hashimoto's at this time.    CKD3a.    Former smoker, quit around .    Noting fatigue and mild GREGORY.  Has some LE edema, no orthopnea.    Lightheaded at times, turning her head or after bending over.  Passes quickly.  No syncope.    Does not activity ramps up her heart rate but not christen palpitations    Recently mechanical fall, ribs are sore.    She is not limited by chest pain, pressure or tightness with activity.   No symptoms of leg claudication.   No stroke/TIA like symptoms.    Mother had CVA in 70s.  Father had CABG in 60s,  at 66 in his sleep.  No history of diabetes.  No ETOH overuse. 1-2 per day.  No caffeine overuse. 3-4 per day.  No recreation substance use.  No hx asthma.    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  Lives in [place].   to [place].  From [place], [place], moved to [place] with family.    DATA REVIEWED by me:  ECG (my personal interpretation) 6-30-21  Sinus, 87, rsr' normal pattern, DRW progression    ECG 4/20/2021  Sinus tachycardia, rate 104, baseline wandering    Echo 6-22-21  No prior study is available for comparison.   Normal left ventricular chamber size.  Left ventricular ejection fraction is visually estimated to be 55%.  Indeterminate diastolic function.  Mild mitral regurgitation.  Estimated right ventricular systolic pressure  is 40 mmHg.  Normal inferior vena cava size and inspiratory collapse.    Myocardial perfusion imaging, 8/16/2017:   Negative stress ECG for ischemia.   No evidence of significant jeopardized viable myocardium or prior myocardial    infarction.   Normal myocardial perfusion with no ischemia.   Normal left ventricular size, ejection fraction, and wall motion.   Normal left ventricular wall motion.  LV ejection fraction = 57%.      Most recent labs:       Lab Results   Component Value Date/Time    WBC 16.5 (H) 04/27/2021 01:38 PM    HEMOGLOBIN 15.1 04/27/2021 01:38 PM    HEMATOCRIT 45.7 04/27/2021 01:38 PM    MCV 89.4 04/27/2021 01:38 PM      Lab Results   Component Value Date/Time    SODIUM 140 04/27/2021 01:38 PM    POTASSIUM 3.9 04/27/2021 01:38 PM    CHLORIDE 105 04/27/2021 01:38 PM    CO2 24 04/27/2021 01:38 PM    GLUCOSE 145 (H) 04/27/2021 01:38 PM    BUN 23 (H) 04/27/2021 01:38 PM    CREATININE 1.14 04/27/2021 01:38 PM      Lab Results   Component Value Date/Time    ASTSGOT 21 04/27/2021 01:38 PM    ALTSGPT 39 04/27/2021 01:38 PM    ALBUMIN 4.28 04/27/2021 01:38 PM    ALBUMIN 4.5 04/27/2021 01:38 PM      Lab Results   Component Value Date/Time    CHOLSTRLTOT 166 01/11/2021 10:29 AM    LDL 84 01/11/2021 10:29 AM    HDL 55 01/11/2021 10:29 AM    TRIGLYCERIDE 135 01/11/2021 10:29 AM     No results for input(s): NTPROBNP, TROPONINT in the  last 72 hours.      Past Medical History:   Diagnosis Date   • Cancer (HCC)     Breast; dx 2013   • Cataract    • CKD (chronic kidney disease) stage 3, GFR 30-59 ml/min (HCC) 8/16/2017   • Gallstones    • Glaucoma     right eye   • Hashimoto's thyroiditis 7/22/2020   • Hyperlipidemia    • Hyperlipidemia LDL goal <100 1/12/2016   • Hypertension    • Malignant neoplasm of upper-outer quadrant of right breast in female, estrogen receptor positive (HCC) 7/10/2020    Complete mastectomy in 2013 with Dr. Garg.  Had Dr. Jay who recommended Femara for total of 10 yeas.   • Uncontrolled daytime somnolence 8/26/2020   • Unexplained night sweats 7/10/2020     Past Surgical History:   Procedure Laterality Date   • HYSTERECTOMY LAPAROSCOPY  53 y/o    cervix and bilat ovaries removed.   • KNEE ARTHROSCOPY  58 y/o   • KNEE ARTHROSCOPY Right    • MASTECTOMY  age 62    right mastectomy , 9 years ago   • TONSILLECTOMY     • TONSILLECTOMY AND ADENOIDECTOMY  7 y/o     Family History   Problem Relation Age of Onset   • Heart Disease Father         quadriple bypass    • Cancer Sister         breast with mets   • Hypertension Mother    • Lung Disease Mother         smoker   • Arthritis Mother         RA   • Stroke Mother    • Thyroid Mother    • Diabetes Maternal Uncle         type I   • Cancer Maternal Grandmother         Colon   • Diabetes Maternal Grandfather         type II   • Arthritis Daughter    • No Known Problems Daughter    • No Known Problems Paternal Grandmother    • No Known Problems Paternal Grandfather      Social History     Socioeconomic History   • Marital status:      Spouse name: Not on file   • Number of children: Not on file   • Years of education: Not on file   • Highest education level: Associate degree: occupational, technical, or vocational program   Occupational History     Comment: Retired LPN   Tobacco Use   • Smoking status: Former Smoker     Packs/day: 0.25     Years: 10.00     Pack years: 2.50       Types: Cigarettes     Quit date: 2000     Years since quittin.5   • Smokeless tobacco: Never Used   Vaping Use   • Vaping Use: Never used   Substance and Sexual Activity   • Alcohol use: Yes     Comment: glass of wine    • Drug use: No   • Sexual activity: Yes     Partners: 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: ; retired LPN   Other Topics Concern   • Not on file   Social History Narrative   • Not on file     Social Determinants of Health     Financial Resource Strain: Low Risk    • Difficulty of Paying Living Expenses: Not hard at all   Food Insecurity: No Food Insecurity   • Worried About Running Out of Food in the Last Year: Never true   • Ran Out of Food in the Last Year: Never true   Transportation Needs: No Transportation Needs   • Lack of Transportation (Medical): No   • Lack of Transportation (Non-Medical): No   Physical Activity: Insufficiently Active   • Days of Exercise per Week: 1 day   • Minutes of Exercise per Session: 20 min   Stress: No Stress Concern Present   • Feeling of Stress : Not at all   Social Connections: Moderately Integrated   • Frequency of Communication with Friends and Family: Three times a week   • Frequency of Social Gatherings with Friends and Family: Patient refused   • Attends Denominational Services: More than 4 times per year   • Active Member of Clubs or Organizations: No   • Attends Club or Organization Meetings: Never   • Marital Status:    Intimate Partner Violence:    • Fear of Current or Ex-Partner:    • Emotionally Abused:    • Physically Abused:    • Sexually Abused:      No Known Allergies    Current Outpatient Medications   Medication Sig Dispense Refill   • amLODIPine (NORVASC) 5 MG Tab Take 1 tablet by mouth every day. 90 tablet 3   • metoprolol tartrate (LOPRESSOR) 25 MG Tab Take 0.5 Tablets by mouth 2 times a day. 50 tablet 3   • pravastatin (PRAVACHOL) 20 MG Tab TAKE ONE TABLET BY MOUTH AT BEDTIME  100 Tab 3   • vitamin D (CHOLECALCIFEROL) 1000 Unit (25 mcg) Tab  "Take 1,000 Units by mouth every day.     • letrozole (FEMARA) 2.5 MG Tab Take 2.5 mg by mouth every day. Indications: Cancer of the Breast     • calcium carbonate (OS-OMERO 500) 1250 MG Tab Take 1,250 mg by mouth 2 times a day, with meals.       No current facility-administered medications for this visit.       ROS  All others systems reviewed and negative.     Objective:     /80 (BP Location: Left arm, Patient Position: Sitting, BP Cuff Size: Adult)   Pulse 98   Ht 1.651 m (5' 5\")   Wt 80.7 kg (178 lb)   SpO2 97%  Body mass index is 29.62 kg/m².    General: No acute distress. Well nourished.  HEENT: EOM grossly intact, no scleral icterus, no pharyngeal erythema.   Neck:  No JVD, no bruits, trachea midline  CVS: RRR. Normal S1, S2. No M/R/G. No LE edema.  2+ radial pulses, 2+ PT pulses  Resp: CTAB. No wheezing or crackles/rhonchi. Normal respiratory effort.  Abdomen: Soft, NT, no christen hepatomegaly.  MSK/Ext: No clubbing or cyanosis.  Skin: Warm and dry, no rashes.  Neurological: CN III-XII grossly intact. No focal deficits.   Psych: A&O x 3, appropriate affect, good judgement      Assessment:     1. SOB (shortness of breath) on exertion  RI[facility] Treadmill Stress   2. History of breast cancer     3. Essential hypertension     4. Mixed hyperlipidemia     5. Stage 3a chronic kidney disease (HCC)     6. Overweight (BMI 25.0-29.9)     7. Malignant neoplasm of upper-outer quadrant of right breast in female, estrogen receptor positive (HCC)     8. Hypercalcemia     9. Nonspecific abnormal electrocardiogram (ECG) (EKG)     10. Lower extremity edema     11. Tachycardia  RI[facility] Treadmill Stress       Medical Decision Making:  Today's Assessment / Status / Plan:     -Hashimotos hyperactive thyroid and caffeine can lead to fast HR, there is nothing else wrong with her EKG, no old heart attack.  -Primary prevention statin  -Blood pressure controlled, but lower extremity edema as a side effect so we going to cut this in half " and add the beta-blocker  -See below about starting metoprolol  -Benazepril and losartan are good medications, I would not be concerned at all about the expected small bump in creatinine which is part of the pharmacokinetics of the medication  -Treadmill to look at her physiology with exercise.  -Depending on treadmill, consider heart monitor  -Echo  -return to clinic        Written instructions given today:      -Treadmill stress test, I am looking at your heart rate and blood pressure response when you get short of breath.  Do not take your beta-blocker the night before the test or the morning of the test.    Checking Blood Pressure:  -Blood pressure cuff, spend in the $40-65, with good return policy  -It should be automatic, upper arm, measure your arm to get the correct size, probably adult Large but your arm should be under 16.5 cm. If you need an XL cuff, you will have to have it special ordered from a pharmacy or durable medical equipment company.  -Put the cuff in place, rest arm on table near height of your heart, sit quietly for 5 min, legs uncrossed, with back support, then take your blood pressure, write it down, keep a log  -Check no more than 1 time day, maybe 4-5 times per week, try different times of day.  -Can bring your cuff to at least one appointment where it can be calibrated to a manual cuff if you are concerned.  -Goal blood pressure is at least under 130/80, ideally under 120/80.  If you think your BP is overall too high, let us know in the office, we can adjust medications, can use [employer] or call the [place] office: [phone].    Salt=sodium=sea salt, guidelines say stay under 2,500 mg daily but I ask for under 4,000 mg daily.  Get salt smart, start looking at labels, count it up.  Salt is hidden in everything, salad dressing, sauces, cheese, most canned food, any processed meat.    Caffeine is a reversible cause of high blood pressure.  The more you can cut down or switch to decaf the  better in terms of blood pressure.    Nasal decongestants can cause high blood pressure.    Untreated sleep apnea is a reversible cause of high blood pressure.    -I may ask you to wear heart monitor after I see your treadmill stress test.    -Some people's heart rates run inappropriately fast and the best way to treat this is to blunted the heart rate response with a beta-blocker.    -I consider your blood pressure to be controlled today but your amlodipine dose should be reduced to 551 to get rid of the ankle swelling in which case your blood pressure is going to go up and we need a second medication to keep you under control.    -It is better to have multiple medications at lower doses you are less likely to get a side effect.    -I consider benazepril and those medications to be good medications and I am not concerned about the effect it has on your kidney testing because it actually protects your kidneys through that same mechanism.    -Metoprolol tartrate 25 mg tablet, start at 12.5 mg AM and PM, just in half.  This is a beta-blocker.  It can blunt the heart rate response and to help people with fast heart rates feel better.  If you feel worse for any reason, stop the medication and let me know.  I am starting you on a very low dose.  If you do not notice significant improvement in heart racing then increase to a full tablet 25 mg a.m. and p.m.  Maximum dose is 100 mg a.m. and p.m.    -Beta-blockers can cause fatigue and slower heart rates but her theory is that your faster heart rate may be contributing to your fatigue and exercise intolerance.  I will know more when I see your stress test.  Beta-blockers can also blunt the response to hyperthyroidism.    -Use [employer] to report back if you are having side effects from medications or if you notice that your blood pressure is no longer controlled.    -See Agnes, our nurse practitioner in about 4 weeks after the treadmill, she can go over the treadmill results,  see how you are doing off of the higher dose of amlodipine and how things are going with a beta-blocker.  She can also order a heart monitor if need be.    Return in about 4 weeks (around 7/28/2021).    It is my pleasure to participate in the care of Ms. Jiang.  Please do not hesitate to contact me with questions or concerns.    Kezia Weeks MD, [facility]  Cardiologist [facility]    Please note that this dictation was created using voice recognition software. I have made every reasonable attempt to correct obvious errors, but it is possible there are errors of grammar and possibly content that I did not discover before finalizing the note.        Gilberto Polk M.D.  [id number]  [address]  [place] [address]  Via In Basket     Tre Britton M.D.  [address]  [address]  [place] [address]  Via In Basket

## 2021-06-30 NOTE — PATIENT INSTRUCTIONS
-Treadmill stress test, I am looking at your heart rate and blood pressure response when you get short of breath.  Do not take your beta-blocker the night before the test or the morning of the test.    Checking Blood Pressure:  -Blood pressure cuff, spend in the $40-65, with good return policy  -It should be automatic, upper arm, measure your arm to get the correct size, probably adult Large but your arm should be under 16.5 cm. If you need an XL cuff, you will have to have it special ordered from a pharmacy or durable medical equipment company.  -Put the cuff in place, rest arm on table near height of your heart, sit quietly for 5 min, legs uncrossed, with back support, then take your blood pressure, write it down, keep a log  -Check no more than 1 time day, maybe 4-5 times per week, try different times of day.  -Can bring your cuff to at least one appointment where it can be calibrated to a manual cuff if you are concerned.  -Goal blood pressure is at least under 130/80, ideally under 120/80.  If you think your BP is overall too high, let us know in the office, we can adjust medications, can use [employer] or call the [employer] office: [phone].    Salt=sodium=sea salt, guidelines say stay under 2,500 mg daily but I ask for under 4,000 mg daily.  Get salt smart, start looking at labels, count it up.  Salt is hidden in everything, salad dressing, sauces, cheese, most canned food, any processed meat.    Caffeine is a reversible cause of high blood pressure.  The more you can cut down or switch to decaf the better in terms of blood pressure.    Nasal decongestants can cause high blood pressure.    Untreated sleep apnea is a reversible cause of high blood pressure.    -I may ask you to wear heart monitor after I see your treadmill stress test.    -Some people's heart rates run inappropriately fast and the best way to treat this is to blunted the heart rate response with a beta-blocker.    -I consider your blood pressure  to be controlled today but your amlodipine dose should be reduced to 551 to get rid of the ankle swelling in which case your blood pressure is going to go up and we need a second medication to keep you under control.    -It is better to have multiple medications at lower doses you are less likely to get a side effect.    -I consider benazepril and those medications to be good medications and I am not concerned about the effect it has on your kidney testing because it actually protects your kidneys through that same mechanism.    -Metoprolol tartrate 25 mg tablet, start at 12.5 mg AM and PM, just in half.  This is a beta-blocker.  It can blunt the heart rate response and to help people with fast heart rates feel better.  If you feel worse for any reason, stop the medication and let me know.  I am starting you on a very low dose.  If you do not notice significant improvement in heart racing then increase to a full tablet 25 mg a.m. and p.m.  Maximum dose is 100 mg a.m. and p.m.    -Beta-blockers can cause fatigue and slower heart rates but her theory is that your faster heart rate may be contributing to your fatigue and exercise intolerance.  I will know more when I see your stress test.  Beta-blockers can also blunt the response to hyperthyroidism.    -Use [employer] to report back if you are having side effects from medications or if you notice that your blood pressure is no longer controlled.    -See Agnes, our nurse practitioner in about 4 weeks after the treadmill, she can go over the treadmill results, see how you are doing off of the higher dose of amlodipine and how things are going with a beta-blocker.  She can also order a heart monitor if need be.

## 2021-07-27 ENCOUNTER — PATIENT MESSAGE (OUTPATIENT)
Dept: MEDICAL GROUP | Facility: LAB | Age: 71
End: 2021-07-27

## 2021-07-27 DIAGNOSIS — E78.5 HYPERLIPIDEMIA, UNSPECIFIED HYPERLIPIDEMIA TYPE: ICD-10-CM

## 2021-07-27 RX ORDER — PRAVASTATIN SODIUM 20 MG
TABLET ORAL
Qty: 100 TABLET | Refills: 3 | Status: SHIPPED | OUTPATIENT
Start: 2021-07-27 | End: 2022-08-30

## 2021-07-27 NOTE — TELEPHONE ENCOUNTER
From: Lauren Jiang  To: Physician Tre Britton  Sent: 7/27/2021 2:38 PM PDT  Subject: Prescription Question    I need a refill on Pravastatin 20mg Sent a request through My Chart but the refill is under Dr. Padron If possible a 100 day refill please at [facility], thank you glad you had some time off

## 2021-07-27 NOTE — PATIENT COMMUNICATION
Received request via: Patient    Was the patient seen in the last year in this department? Yes   LOV: 05/2021    Does the patient have an active prescription (recently filled or refills available) for medication(s) requested? No

## 2021-08-01 ENCOUNTER — PATIENT MESSAGE (OUTPATIENT)
Dept: HEALTH INFORMATION MANAGEMENT | Facility: OTHER | Age: 71
End: 2021-08-01

## 2021-08-04 ENCOUNTER — OFFICE VISIT (OUTPATIENT)
Dept: CARDIOLOGY | Facility: PHYSICIAN GROUP | Age: 71
End: 2021-08-04
Payer: MEDICARE

## 2021-08-04 VITALS
BODY MASS INDEX: 29.66 KG/M2 | WEIGHT: 178 LBS | DIASTOLIC BLOOD PRESSURE: 70 MMHG | OXYGEN SATURATION: 95 % | HEIGHT: 65 IN | SYSTOLIC BLOOD PRESSURE: 126 MMHG | HEART RATE: 85 BPM

## 2021-08-04 DIAGNOSIS — Z17.0 MALIGNANT NEOPLASM OF UPPER-OUTER QUADRANT OF RIGHT BREAST IN FEMALE, ESTROGEN RECEPTOR POSITIVE (HCC): ICD-10-CM

## 2021-08-04 DIAGNOSIS — E06.3 HASHIMOTO'S THYROIDITIS: ICD-10-CM

## 2021-08-04 DIAGNOSIS — E78.2 MIXED HYPERLIPIDEMIA: ICD-10-CM

## 2021-08-04 DIAGNOSIS — C50.411 MALIGNANT NEOPLASM OF UPPER-OUTER QUADRANT OF RIGHT BREAST IN FEMALE, ESTROGEN RECEPTOR POSITIVE (HCC): ICD-10-CM

## 2021-08-04 DIAGNOSIS — I10 ESSENTIAL HYPERTENSION: ICD-10-CM

## 2021-08-04 PROCEDURE — 99214 OFFICE O/P EST MOD 30 MIN: CPT | Performed by: NURSE PRACTITIONER

## 2021-08-04 RX ORDER — MOXIFLOXACIN 5 MG/ML
SOLUTION/ DROPS OPHTHALMIC
COMMUNITY
Start: 2021-07-13 | End: 2021-08-04

## 2021-08-04 RX ORDER — PREDNISOLONE ACETATE 10 MG/ML
SUSPENSION/ DROPS OPHTHALMIC
COMMUNITY
Start: 2021-07-01 | End: 2021-08-04

## 2021-08-04 ASSESSMENT — ENCOUNTER SYMPTOMS
HEADACHES: 0
SHORTNESS OF BREATH: 1
LOSS OF CONSCIOUSNESS: 0
BRUISES/BLEEDS EASILY: 0
FEVER: 0
ORTHOPNEA: 0
INSOMNIA: 0
DIZZINESS: 0
MYALGIAS: 0
ABDOMINAL PAIN: 0
NAUSEA: 0
PND: 0
COUGH: 0
PALPITATIONS: 0
CHILLS: 0

## 2021-08-04 ASSESSMENT — FIBROSIS 4 INDEX: FIB4 SCORE: 0.78

## 2021-08-19 ENCOUNTER — HOSPITAL ENCOUNTER (OUTPATIENT)
Dept: RADIOLOGY | Facility: MEDICAL CENTER | Age: 71
End: 2021-08-19
Attending: INTERNAL MEDICINE
Payer: MEDICARE

## 2021-08-19 DIAGNOSIS — R92.30 BREAST DENSITY: ICD-10-CM

## 2021-08-19 DIAGNOSIS — C50.911 MALIGNANT NEOPLASM OF RIGHT FEMALE BREAST, UNSPECIFIED ESTROGEN RECEPTOR STATUS, UNSPECIFIED SITE OF BREAST (HCC): ICD-10-CM

## 2021-08-19 PROCEDURE — 76641 ULTRASOUND BREAST COMPLETE: CPT

## 2021-08-24 ENCOUNTER — HOSPITAL ENCOUNTER (OUTPATIENT)
Dept: LAB | Facility: MEDICAL CENTER | Age: 71
End: 2021-08-24
Attending: INTERNAL MEDICINE
Payer: MEDICARE

## 2021-08-24 ENCOUNTER — OFFICE VISIT (OUTPATIENT)
Dept: ENDOCRINOLOGY | Facility: MEDICAL CENTER | Age: 71
End: 2021-08-24
Attending: INTERNAL MEDICINE
Payer: MEDICARE

## 2021-08-24 VITALS
HEIGHT: 65 IN | WEIGHT: 177 LBS | OXYGEN SATURATION: 96 % | BODY MASS INDEX: 29.49 KG/M2 | SYSTOLIC BLOOD PRESSURE: 122 MMHG | DIASTOLIC BLOOD PRESSURE: 70 MMHG | HEART RATE: 91 BPM | RESPIRATION RATE: 15 BRPM

## 2021-08-24 DIAGNOSIS — E06.3 HASHIMOTO'S THYROIDITIS: ICD-10-CM

## 2021-08-24 DIAGNOSIS — M85.80 OSTEOPENIA, UNSPECIFIED LOCATION: ICD-10-CM

## 2021-08-24 DIAGNOSIS — E83.52 HYPERCALCEMIA: ICD-10-CM

## 2021-08-24 DIAGNOSIS — Z78.0 POSTMENOPAUSAL: ICD-10-CM

## 2021-08-24 DIAGNOSIS — E04.2 NON-TOXIC MULTINODULAR GOITER: ICD-10-CM

## 2021-08-24 LAB
T4 FREE SERPL-MCNC: 1.15 NG/DL (ref 0.93–1.7)
TSH SERPL DL<=0.005 MIU/L-ACNC: 0.93 UIU/ML (ref 0.38–5.33)

## 2021-08-24 PROCEDURE — 36415 COLL VENOUS BLD VENIPUNCTURE: CPT

## 2021-08-24 PROCEDURE — 84443 ASSAY THYROID STIM HORMONE: CPT

## 2021-08-24 PROCEDURE — 99211 OFF/OP EST MAY X REQ PHY/QHP: CPT | Performed by: INTERNAL MEDICINE

## 2021-08-24 PROCEDURE — 84439 ASSAY OF FREE THYROXINE: CPT

## 2021-08-24 PROCEDURE — 99214 OFFICE O/P EST MOD 30 MIN: CPT | Performed by: INTERNAL MEDICINE

## 2021-08-24 ASSESSMENT — FIBROSIS 4 INDEX: FIB4 SCORE: 0.78

## 2021-08-25 NOTE — PROGRESS NOTES
Chief Complaint: Follow up for positive anti-TPO antibody testing compatible with Hashimoto's but with normal TSH levels, history of hypercalcemia now resolved, history of osteopenia    HPI:     Lauren Jiang is a 71 y.o. female here for follow up of the above medical issues    I initially saw her as a referral for elevated TPO antibodies but with baseline normal TSH levels.  She has multiple somatic complaints which are not explained by her normal TSH levels.  This include fatigue, coldness, difficulty losing weight, dry skin and hair loss    She has multiple thyroid nodules but no anterior neck compressive symptoms from her thyroid    Her baseline TSH was 0.608 in January 2021 with elevated TPO antibodies of 115 but because her TSH has remained stable and she is euthyroid on serial monitoring I have not started her on thyroid hormone replacement because there is no indication to do so    Her TSH was 0.460 on April 2021    She was supposed to get a TSH before this visit but she was unable to do so    Her last ultrasound on October 2021 showed spongiform nodules which are low risk for malignancy in the right lower lobe (2.4 cm ) , right mid lobe (1.1 cm) and left lower lobe (1.0 cm) for which I have recommended observation.  She denies a family history of thyroid cancer and denies radiation exposure to the head and neck              Her other comorbid issues include estrogen receptor positive breast cancer treated with surgery and she is on aromatase inhibitor letrozole.    She has baseline osteopenia from her bone density on September 16, 2019 with the lowest T score of -1.5 for the left hip.  With nonsignificant elevation of FRAX scores 10.2% 1.5% respectively for 10-year risk of any major fracture and hip fracture.    She is not on bisphosphonates but she is taking vitamin D    Initially her calcium was elevated at 10.3 on January 2021 but repeat work-up on April 2021 showed normal calcium levels with normal  PTH levels    Work-up for bone loss was negative for myeloma and vitamin D deficiency and hyperparathyroidism and Paget's disease        Patient's medications, allergies, and social histories were reviewed and updated as appropriate.      ROS:     CONS:     No fever, no chills   EYES:     No diplopia, no blurry vision   CV:           No chest pain, no palpitations   PULM:     No SOB, no cough, no hemoptysis.   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  2019-04: Adjustment disorder with mixed anxiety and depressed mood  2018-04: Other constipation  2018-04: Vaginal burning  2017-08: Overweight (BMI 25.0-29.9)  2017-08: Atypical chest pain due to GERD  2017-08: CKD (chronic kidney disease) stage 3, GFR 30-59 ml/min ([facility])  2017-08: HTN (hypertension)  2016-08: Decreased GFR  2016-08: Chronic fatigue  2016-01: History of breast cancer  2016-01: Essential hypertension  2016-01: Glaucoma  2016-01: Mixed hyperlipidemia  2016-01: Gastroesophageal reflux disease without esophagitis  2016-01: Hot flashes  2016-01: Osteopenia  2016-01: Preventative health care      Past Surgical History:  Past Surgical History:   Procedure Laterality Date   • HYSTERECTOMY LAPAROSCOPY  51 y/o    cervix and bilat ovaries removed.   • KNEE ARTHROSCOPY  60 y/o   • KNEE ARTHROSCOPY Right    • MASTECTOMY  age 62    right mastectomy , 9 years ago   • TONSILLECTOMY     • TONSILLECTOMY AND ADENOIDECTOMY  7 y/o        Allergies:  Patient has no  "known allergies.     Social History:  Social History     Tobacco Use   • Smoking status: Former Smoker     Packs/day: 0.25     Years: 10.00     Pack years: 2.50     Types: Cigarettes     Quit date: 2000     Years since quittin.6   • Smokeless tobacco: Never Used   Vaping Use   • Vaping Use: Never used   Substance Use Topics   • Alcohol use: Yes     Comment: glass of wine    • Drug use: No        Family History:   family history includes Arthritis in her daughter and mother; Cancer in her maternal grandmother and sister; Diabetes in her maternal grandfather and maternal uncle; Heart Disease in her father; Hypertension in her mother; Lung Disease in her mother; No Known Problems in her daughter, paternal grandfather, and paternal grandmother; Stroke in her mother; Thyroid in her mother.      PHYSICAL EXAM:   Vital signs: /70 (BP Location: Left arm, Patient Position: Sitting, BP Cuff Size: Adult)   Pulse 91   Resp 15   Ht 1.651 m (5' 5\")   Wt 80.3 kg (177 lb)   SpO2 96%   BMI 29.45 kg/m²   GENERAL: Well-developed, well-nourished in no apparent distress.   EYE:  No ocular asymmetry, PERRLA  HENT: Pink, moist mucous membranes.    NECK: No thyromegaly.   CARDIOVASCULAR:  No murmurs  LUNGS: Clear breath sounds  ABDOMEN: Soft, nontender   EXTREMITIES: No clubbing, cyanosis, or edema.   NEUROLOGICAL: No gross focal motor abnormalities   LYMPH: No cervical adenopathy palpated.   SKIN: No rashes, lesions.       Labs:  Lab Results   Component Value Date/Time    SODIUM 140 2021 01:38 PM    POTASSIUM 3.9 2021 01:38 PM    CHLORIDE 105 2021 01:38 PM    CO2 24 2021 01:38 PM    ANION 11.0 2021 01:38 PM    GLUCOSE 145 (H) 2021 01:38 PM    BUN 23 (H) 2021 01:38 PM    CREATININE 1.14 2021 01:38 PM    CALCIUM 10.1 2021 01:38 PM    ASTSGOT 21 2021 01:38 PM    ALTSGPT 39 2021 01:38 PM    TBILIRUBIN 0.4 2021 01:38 PM    ALBUMIN 4.28 2021 01:38 " PM    ALBUMIN 4.5 04/27/2021 01:38 PM    TOTPROTEIN 7.0 04/27/2021 01:38 PM    TOTPROTEIN 7.0 04/27/2021 01:38 PM    GLOBULIN 2.5 04/27/2021 01:38 PM    AGRATIO 1.8 04/27/2021 01:38 PM       Lab Results   Component Value Date/Time    SODIUM 140 04/27/2021 1338    POTASSIUM 3.9 04/27/2021 1338    CHLORIDE 105 04/27/2021 1338    CO2 24 04/27/2021 1338    GLUCOSE 145 (H) 04/27/2021 1338    BUN 23 (H) 04/27/2021 1338    CREATININE 1.14 04/27/2021 1338    CALCIUM 10.1 04/27/2021 1338    ANION 11.0 04/27/2021 1338       Lab Results   Component Value Date/Time    CHOLSTRLTOT 166 01/11/2021 1029    TRIGLYCERIDE 135 01/11/2021 1029    HDL 55 01/11/2021 1029    LDL 84 01/11/2021 1029       Lab Results   Component Value Date/Time    TSHULTRASEN 0.460 04/27/2021 1338     Lab Results   Component Value Date/Time    FREET4 1.12 04/27/2021 1338     No results found for: FREET3  No results found for: THYSTIMIG    Lab Results   Component Value Date/Time    MICROSOMALA 97.0 (H) 01/11/2021 1029         Imaging:      ASSESSMENT/PLAN:     1. Hashimoto's thyroiditis  Stable  Reviewed again the pathogenesis of Hashimoto's that does not automatically mean that she needs to be on thyroid hormone because she is biochemically euthyroid based on her normal TSH levels    Her somatic symptoms of feeling cold hair loss and weight gain are not explained by her normal TSH    I recommend observation and repeating her TSH today and in 6 months      2. Non-toxic multinodular goiter  Stable she has low risk spongiform nodules with risk of malignancy of less than 3%  Recommend observation and repeating her ultrasound in the office in 1 year    3. Osteopenia, unspecified location  Stable  She is at high risk for bone loss because she is on aromatase inhibitor therapy  Continue calcium and vitamin D supplementation  Bone density should be repeated on September 17, 2021   I advised her to contact her primary care to get her bone density scheduled  For some  reason when I try to order her bone density her insurance does not want to cover it    4. Hypercalcemia  Resolve repeat calcium levels were normal and PTH levels were not inappropriately elevated therefore she does not have primary hyperparathyroidism      Return in about 6 months (around 2/24/2022).      Thank you kindly for allowing me to participate in the thyroid care plan for this patient.    Gilberto Polk MD, [facility], [facility]  08/24/21    CC:   Tre Britton M.D.

## 2021-09-01 ENCOUNTER — APPOINTMENT (OUTPATIENT)
Dept: RADIOLOGY | Facility: MEDICAL CENTER | Age: 71
End: 2021-09-01
Attending: INTERNAL MEDICINE
Payer: MEDICARE

## 2021-09-01 DIAGNOSIS — R06.02 SOB (SHORTNESS OF BREATH): Primary | ICD-10-CM

## 2021-09-01 NOTE — PROCEDURES
Treadmill Stress test cancelled as pt states she took her metoprolol this am, wasn't informed of prep instructions. Procedure explained, given full prep instructions for next time, pt to call to be rescheduled.

## 2021-09-17 ENCOUNTER — HOSPITAL ENCOUNTER (OUTPATIENT)
Dept: RADIOLOGY | Facility: MEDICAL CENTER | Age: 71
End: 2021-09-17
Attending: INTERNAL MEDICINE
Payer: MEDICARE

## 2021-09-17 PROCEDURE — 93017 CV STRESS TEST TRACING ONLY: CPT

## 2021-09-17 PROCEDURE — 93018 CV STRESS TEST I&R ONLY: CPT | Performed by: INTERNAL MEDICINE

## 2021-09-29 ENCOUNTER — OFFICE VISIT (OUTPATIENT)
Dept: CARDIOLOGY | Facility: PHYSICIAN GROUP | Age: 71
End: 2021-09-29
Payer: MEDICARE

## 2021-09-29 VITALS
DIASTOLIC BLOOD PRESSURE: 70 MMHG | HEIGHT: 65 IN | HEART RATE: 71 BPM | RESPIRATION RATE: 14 BRPM | WEIGHT: 178.3 LBS | SYSTOLIC BLOOD PRESSURE: 112 MMHG | OXYGEN SATURATION: 94 % | BODY MASS INDEX: 29.71 KG/M2

## 2021-09-29 DIAGNOSIS — E04.2 NON-TOXIC MULTINODULAR GOITER: ICD-10-CM

## 2021-09-29 DIAGNOSIS — N18.31 STAGE 3A CHRONIC KIDNEY DISEASE: ICD-10-CM

## 2021-09-29 DIAGNOSIS — Z85.3 HISTORY OF BREAST CANCER: ICD-10-CM

## 2021-09-29 DIAGNOSIS — Z17.0 MALIGNANT NEOPLASM OF UPPER-OUTER QUADRANT OF RIGHT BREAST IN FEMALE, ESTROGEN RECEPTOR POSITIVE (HCC): ICD-10-CM

## 2021-09-29 DIAGNOSIS — C50.411 MALIGNANT NEOPLASM OF UPPER-OUTER QUADRANT OF RIGHT BREAST IN FEMALE, ESTROGEN RECEPTOR POSITIVE (HCC): ICD-10-CM

## 2021-09-29 DIAGNOSIS — I10 ESSENTIAL HYPERTENSION: ICD-10-CM

## 2021-09-29 DIAGNOSIS — E78.2 MIXED HYPERLIPIDEMIA: ICD-10-CM

## 2021-09-29 DIAGNOSIS — E06.3 HASHIMOTO'S THYROIDITIS: ICD-10-CM

## 2021-09-29 PROCEDURE — 99214 OFFICE O/P EST MOD 30 MIN: CPT | Performed by: NURSE PRACTITIONER

## 2021-09-29 ASSESSMENT — ENCOUNTER SYMPTOMS
PND: 0
NAUSEA: 0
INSOMNIA: 0
MYALGIAS: 0
CHILLS: 0
ABDOMINAL PAIN: 0
FEVER: 0
LOSS OF CONSCIOUSNESS: 0
BRUISES/BLEEDS EASILY: 0
ORTHOPNEA: 0
HEADACHES: 0
DIZZINESS: 0
SHORTNESS OF BREATH: 1
PALPITATIONS: 0
COUGH: 0

## 2021-09-29 ASSESSMENT — FIBROSIS 4 INDEX: FIB4 SCORE: 0.78

## 2021-09-29 NOTE — PROGRESS NOTES
Chief Complaint   Patient presents with   • Follow-Up   • Shortness of Breath   • HTN (Controlled)   • Hyperlipidemia   • Breast Cancer       Subjective     Katie Jiang is a 71 y.o. female who presents today for follow-up of shortness of breath and recent ETT.    Katie is a 71 year old female with history of hypertension, hyperlipidemia, Hashimoto's thyroiditis and history of breast cancer, previously followed by Dr. MOHAN Smith, and more recently followed by Dr. Weeks for evaluation of shortness of breath and LE edema.     In June 2021, Amlodipine was lowered from 10mg to 5mg (which helped with LE edema) and Metoprolol 12.5mg BID was added. Exercise treadmill test was also ordered, and completed on 9/17/2021.     She is here today for follow-up. BP is stable, and LE edema is much improved. She is feeling better, but does still have some mild shortness of breath, mostly with exertion. No orthopnea or PND; she does admit to not being as active and has put on weight in the last year. No chest pain, pressure or discomfort; no palpitations; no dizziness or syncope. She does see Dr. Gimenez for management of her thyroid. She is wondering if any of her symptoms of fatigue and shortness of breath are related to her breast implant (after breast CA treatment); she is also a former smoker.    Past Medical History:   Diagnosis Date   • Breast cancer (HCC) 2013   • Cataract    • CKD (chronic kidney disease) stage 3, GFR 30-59 ml/min (HCC)     • Gallstones    • Glaucoma     right eye   • Hashimoto's thyroiditis 7/22/2020   • Hyperlipidemia    • Hypertension    • Malignant neoplasm of upper-outer quadrant of right breast in female, estrogen receptor positive (HCC) 2013    Complete mastectomy in 2013 with Dr. Garg.  Had Dr. Jay who recommended Femara for total of 10 yeas.   • Uncontrolled daytime somnolence 8/26/2020   • Unexplained night sweats 7/10/2020     Past Surgical History:   Procedure Laterality Date   •  HYSTERECTOMY LAPAROSCOPY  51 y/o    cervix and bilat ovaries removed.   • KNEE ARTHROSCOPY  58 y/o   • KNEE ARTHROSCOPY Right    • MASTECTOMY  age 62    right mastectomy , 9 years ago   • TONSILLECTOMY     • TONSILLECTOMY AND ADENOIDECTOMY  7 y/o     Family History   Problem Relation Age of Onset   • Heart Disease Father         quadriple bypass    • Cancer Sister         breast with mets   • Hypertension Mother    • Lung Disease Mother         smoker   • Arthritis Mother         RA   • Stroke Mother    • Thyroid Mother    • Diabetes Maternal Uncle         type I   • Cancer Maternal Grandmother         Colon   • Diabetes Maternal Grandfather         type II   • Arthritis Daughter    • No Known Problems Daughter    • No Known Problems Paternal Grandmother    • No Known Problems Paternal Grandfather      Social History     Socioeconomic History   • Marital status:      Spouse name: Not on file   • Number of children: Not on file   • Years of education: Not on file   • Highest education level: Associate degree: occupational, technical, or vocational program   Occupational History     Comment: Retired LPN   Tobacco Use   • Smoking status: Former Smoker     Packs/day: 0.25     Years: 10.00     Pack years: 2.50     Types: Cigarettes     Quit date: 2000     Years since quittin.7   • Smokeless tobacco: Never Used   Vaping Use   • Vaping Use: Never used   Substance and Sexual Activity   • Alcohol use: Yes     Comment: glass of wine    • Drug use: No   • Sexual activity: Yes     Partners: 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: ; retired LPN   Other Topics Concern   • Not on file   Social History Narrative   • Not on file     Social Determinants of Health     Financial Resource Strain: Low Risk    • Difficulty of Paying Living Expenses: Not hard at all   Food Insecurity: No Food Insecurity   • Worried About Running Out of Food in the Last Year: Never true   • Ran Out of Food in the Last Year: Never true   Transportation  Needs: No Transportation Needs   • Lack of Transportation (Medical): No   • Lack of Transportation (Non-Medical): No   Physical Activity: Insufficiently Active   • Days of Exercise per Week: 1 day   • Minutes of Exercise per Session: 20 min   Stress: No Stress Concern Present   • Feeling of Stress : Not at all   Social Connections: Moderately Integrated   • Frequency of Communication with Friends and Family: Three times a week   • Frequency of Social Gatherings with Friends and Family: Patient refused   • Attends Anabaptist Services: More than 4 times per year   • Active Member of Clubs or Organizations: No   • Attends Club or Organization Meetings: Never   • Marital Status:    Intimate Partner Violence:    • Fear of Current or Ex-Partner:    • Emotionally Abused:    • Physically Abused:    • Sexually Abused:      No Known Allergies  Outpatient Encounter Medications as of 9/29/2021   Medication Sig Dispense Refill   • metoprolol tartrate (LOPRESSOR) 25 MG Tab Take 0.5 Tablets by mouth 2 times a day. 100 tablet 3   • pravastatin (PRAVACHOL) 20 MG Tab TAKE ONE TABLET BY MOUTH AT BEDTIME 100 tablet 3   • amLODIPine (NORVASC) 5 MG Tab Take 1 tablet by mouth every day. 90 tablet 3   • vitamin D (CHOLECALCIFEROL) 1000 Unit (25 mcg) Tab Take 1,000 Units by mouth every day.     • letrozole (FEMARA) 2.5 MG Tab Take 2.5 mg by mouth every day. Indications: Cancer of the Breast     • calcium carbonate (OS-OMERO 500) 1250 MG Tab Take 1,250 mg by mouth 2 times a day, with meals.       No facility-administered encounter medications on file as of 9/29/2021.     Review of Systems   Constitutional: Positive for malaise/fatigue. Negative for chills and fever.   HENT: Negative for congestion.    Respiratory: Positive for shortness of breath. Negative for cough.    Cardiovascular: Negative for chest pain, palpitations, orthopnea, leg swelling and PND.   Gastrointestinal: Negative for abdominal pain and nausea.   Musculoskeletal:  "Negative for myalgias.   Skin: Negative for rash.   Neurological: Negative for dizziness, loss of consciousness and headaches.   Endo/Heme/Allergies: Does not bruise/bleed easily.   Psychiatric/Behavioral: The patient does not have insomnia.               Objective     /70 (BP Location: Left arm, Patient Position: Sitting, BP Cuff Size: Adult)   Pulse 71   Resp 14   Ht 1.651 m (5' 5\")   Wt 80.9 kg (178 lb 4.8 oz)   SpO2 94%   BMI 29.67 kg/m²     Physical Exam  Constitutional:       Appearance: She is well-developed.   HENT:      Head: Normocephalic.   Neck:      Vascular: No JVD.   Cardiovascular:      Rate and Rhythm: Normal rate and regular rhythm.      Heart sounds: Normal heart sounds.      Comments: Right breast implant.  Pulmonary:      Effort: Pulmonary effort is normal. No respiratory distress.      Breath sounds: Normal breath sounds. No wheezing or rales.      Comments: Right mastectomy.  Abdominal:      General: Bowel sounds are normal. There is no distension.      Palpations: Abdomen is soft.      Tenderness: There is no abdominal tenderness.   Musculoskeletal:         General: Normal range of motion.      Cervical back: Normal range of motion and neck supple.   Skin:     General: Skin is warm and dry.      Findings: No rash.   Neurological:      Mental Status: She is alert and oriented to person, place, and time.       CONCLUSIONS OF ECHOCARDIOGRAM OF 6/22/2021:  No prior study is available for comparison.   Normal left ventricular chamber size.  Left ventricular ejection fraction is visually estimated to be 55%.  Indeterminate diastolic function.  Mild mitral regurgitation.  Estimated right ventricular systolic pressure  is 40 mmHg.  Normal inferior vena cava size and inspiratory collapse.     CONCLUSIONS OF ETT OF 9/17/2021:   Normal treadmill stress test.   Average exercise tolerance for age.   Blunted BP response to exercise, it is possible resting BP was not    assessed immediately " prior.   Sinus rhythm.    No ischemic ECG changes. 0.5 mm up sloping ST depression.   No chest pain during stress.   [facility] treadmill score 4, moderate risk (assume no prior CAD).    Component      Latest Ref Rng & Units 4/27/2021 4/27/2021 4/27/2021 4/27/2021           1:38 PM  1:38 PM  1:38 PM  1:38 PM   Iron      40 - 170 ug/dL 87      Total Iron Binding      250 - 450 ug/dL 329      Unsat Iron Binding      110 - 370 ug/dL 242      % Saturation      15 - 55 % 26      Ferritin      10.0 - 291.0 ng/mL  117.0     Free T-4      0.93 - 1.70 ng/dL   1.12    TSH      0.380 - 5.330 uIU/mL    0.460     Lab Results   Component Value Date/Time    SODIUM 140 04/27/2021 01:38 PM    POTASSIUM 3.9 04/27/2021 01:38 PM    CHLORIDE 105 04/27/2021 01:38 PM    CO2 24 04/27/2021 01:38 PM    GLUCOSE 145 (H) 04/27/2021 01:38 PM    BUN 23 (H) 04/27/2021 01:38 PM    CREATININE 1.14 04/27/2021 01:38 PM     Lab Results   Component Value Date/Time    ASTSGOT 21 04/27/2021 01:38 PM    ALTSGPT 39 04/27/2021 01:38 PM      Lab Results   Component Value Date/Time    CHOLSTRLTOT 166 01/11/2021 10:29 AM    LDL 84 01/11/2021 10:29 AM    HDL 55 01/11/2021 10:29 AM    TRIGLYCERIDE 135 01/11/2021 10:29 AM         Assessment & Plan     1. Essential hypertension     2. Mixed hyperlipidemia     3. History of breast cancer     4. Malignant neoplasm of upper-outer quadrant of right breast in female, estrogen receptor positive (HCC)     5. Hashimoto's thyroiditis     6. Non-toxic multinodular goiter     7. Stage 3a chronic kidney disease (HCC)         Medical Decision Making: Today's Assessment/Status/Plan:      1. Hypertension, treated with Amlodipine 5mg once daily and Metoprolol 12.5mg BID. BP is well controlled.    2. Hyperlipidemia, treated with Pravachol 20mg. Last lipid panel was good.    3. History of breast cancer, status post mastectomy and breast implant. Discussed seeing original surgeon about possibly removing implant.    4. Thyroiditis, followed  by endocrinology. She has follow-up in February 2022.    5. Ongoing shortness of breath and fatigue. Reassured regarding ETT and echocardiogram results. Suggested referral to pulmonology for baseline CT scan and PFTs - she is given the name/number of [facility].    6. Chronic kidney disease, mild, stable.    She is reassured regarding ETT and echocardiogram. She can follow-up with us PRN; her medical problems can be managed by her PCP at this time. She can always see us if new or different problems/symptoms arise.

## 2021-11-09 ENCOUNTER — TELEPHONE (OUTPATIENT)
Dept: MEDICAL GROUP | Facility: LAB | Age: 71
End: 2021-11-09

## 2021-11-09 RX ORDER — LETROZOLE 2.5 MG/1
TABLET, FILM COATED ORAL
COMMUNITY
End: 2021-11-18

## 2021-11-09 NOTE — TELEPHONE ENCOUNTER
ANNUAL WELLNESS VISIT PRE-VISIT PLANNING    1.  Reviewed notes from the last office visit: Yes    2.  If any orders were ordered or intended to be done prior to visit (i.e. 6 mos follow-up), do we have results/consult notes or has patient scheduled?        •  Labs - Labs were not ordered at last office visit.  Note: If patient appointment is for lab review and patient did not complete labs, check with provider if OK to reschedule patient until labs completed.       •  Imaging - Imaging was not ordered at last office visit.       •  Referrals - No referrals were ordered at last office visit.    3.  Immunizations were updated in Epic using Reconcile Outside Information activity? Yes     4.  Patient is due for the following Health Maintenance Topics:   Health Maintenance Due   Topic Date Due   • IMM ZOSTER VACCINES (2 of 3) 12/25/2017   • IMM DTaP/Tdap/Td Vaccine (2 - Td or Tdap) 02/17/2021   • Annual Wellness Visit  02/19/2021   • IMM INFLUENZA (1) 09/01/2021   • COVID-19 Vaccine (3 - Booster for Moderna series) 09/30/2021     5.  Reviewed/Updated the following with patient:       •   Preferred Pharmacy? Yes       •   Preferred Lab? Yes       •   Preferred Communication? Yes       •   Allergies? Yes       •   Medications? YES. Was Abstract Encounter opened and chart updated? YES  6.  Care Team Updated:       •   DME Company (gait device, O2, CPAP, etc.): N\A       •   Other Specialists (eye doctor, derm, GYN, cardiology, endo, etc): YES    7.  Patient was advised: “This is a free wellness visit. The provider will screen for medical conditions to help you stay healthy. If you have other concerns to address you may be asked to discuss these at a separate visit or there may be an additional fee.”     8.  AHA (Puls8) form printed for Provider? No, patient does not have any open alerts

## 2021-11-18 ENCOUNTER — OFFICE VISIT (OUTPATIENT)
Dept: MEDICAL GROUP | Facility: LAB | Age: 71
End: 2021-11-18
Payer: MEDICARE

## 2021-11-18 VITALS
HEIGHT: 65 IN | OXYGEN SATURATION: 92 % | SYSTOLIC BLOOD PRESSURE: 130 MMHG | DIASTOLIC BLOOD PRESSURE: 68 MMHG | RESPIRATION RATE: 14 BRPM | TEMPERATURE: 96.6 F | BODY MASS INDEX: 30.09 KG/M2 | WEIGHT: 180.6 LBS | HEART RATE: 76 BPM

## 2021-11-18 DIAGNOSIS — E06.3 HASHIMOTO'S THYROIDITIS: ICD-10-CM

## 2021-11-18 DIAGNOSIS — M25.59 PAIN IN OTHER JOINT: ICD-10-CM

## 2021-11-18 DIAGNOSIS — E78.2 MIXED HYPERLIPIDEMIA: ICD-10-CM

## 2021-11-18 DIAGNOSIS — I10 ESSENTIAL HYPERTENSION: ICD-10-CM

## 2021-11-18 DIAGNOSIS — R79.89 ABNORMAL CBC MEASUREMENT: ICD-10-CM

## 2021-11-18 DIAGNOSIS — Z23 NEED FOR TDAP VACCINATION: ICD-10-CM

## 2021-11-18 DIAGNOSIS — R06.02 SOB (SHORTNESS OF BREATH) ON EXERTION: ICD-10-CM

## 2021-11-18 DIAGNOSIS — N18.31 STAGE 3A CHRONIC KIDNEY DISEASE: ICD-10-CM

## 2021-11-18 DIAGNOSIS — Z23 FLU VACCINE NEED: ICD-10-CM

## 2021-11-18 DIAGNOSIS — Z85.3 HISTORY OF BREAST CANCER: ICD-10-CM

## 2021-11-18 PROCEDURE — 90715 TDAP VACCINE 7 YRS/> IM: CPT | Performed by: FAMILY MEDICINE

## 2021-11-18 PROCEDURE — G0439 PPPS, SUBSEQ VISIT: HCPCS | Mod: 25 | Performed by: FAMILY MEDICINE

## 2021-11-18 PROCEDURE — 99213 OFFICE O/P EST LOW 20 MIN: CPT | Mod: 25 | Performed by: FAMILY MEDICINE

## 2021-11-18 PROCEDURE — 90472 IMMUNIZATION ADMIN EACH ADD: CPT | Performed by: FAMILY MEDICINE

## 2021-11-18 PROCEDURE — G0008 ADMIN INFLUENZA VIRUS VAC: HCPCS | Performed by: FAMILY MEDICINE

## 2021-11-18 PROCEDURE — 90662 IIV NO PRSV INCREASED AG IM: CPT | Performed by: FAMILY MEDICINE

## 2021-11-18 ASSESSMENT — FIBROSIS 4 INDEX: FIB4 SCORE: 0.78

## 2021-11-18 ASSESSMENT — ENCOUNTER SYMPTOMS: GENERAL WELL-BEING: FAIR

## 2021-11-18 ASSESSMENT — ACTIVITIES OF DAILY LIVING (ADL): BATHING_REQUIRES_ASSISTANCE: 0

## 2021-11-18 ASSESSMENT — PATIENT HEALTH QUESTIONNAIRE - PHQ9: CLINICAL INTERPRETATION OF PHQ2 SCORE: 0

## 2021-11-18 NOTE — PROGRESS NOTES
Chief Complaint   Patient presents with   • Annual Wellness Visit         HPI: Established patient   Katie is a 71 y.o. here for Medicare Annual Wellness Visit    1. Essential hypertension  Taking medications as directed, blood pressure today 130/68    2. Mixed hyperlipidemia    Taking pravastatin without reported side effects.        3. History of breast cancer  History of breast cancer, she continues to follow-up with her oncologist.  Recently stopped from using letrozole    4. Stage 3a chronic kidney disease   Kidney function test has been at baseline, asymptomatic    5. Hashimoto's thyroiditis    Elevated autoantibodies without specific symptoms, not taking medication, continues to follow-up with endocrinology    6. SOB (shortness of breath) on exertion  Chronic problem, without changes of her baseline, usually more with exertion.  Echocardiogram without significant findings, ruled out cardiac causes, followed up with cardiology.  Patient requesting new referral to see the pulmonology.  Chest x-ray no significant findings.  Discussed with the patient pulmonary function test and CAT scan for further evaluation, vital signs are stable  No unintentional weight loss.  No chest pain.  7. Abnormal CBC measurement  Last CBC with elevated white blood cells back in April, asymptomatic at this time we will repeat for further follow-up    8. Pain in other joint    Reports generalized arthralgia and joint pain, for at least more than 2 months, not sure if it is related to her letrozole medication which she stopped yesterday as per recommendation from her oncologist.  Patient reports that all her joints are achy.  Would like to address this concern.  Sometimes she feels pain in her hands more and burning sensation.  Denies swelling of the joints.  No unintentional weight loss.    Patient Active Problem List    Diagnosis Date Noted   • Health care maintenance 05/20/2021   • Hypercalcemia 04/27/2021   • Non-toxic multinodular  goiter 04/27/2021   • Uncontrolled daytime somnolence 08/26/2020   • Hashimoto's thyroiditis 07/22/2020   • Malignant neoplasm of upper-outer quadrant of right breast in female, estrogen receptor positive (HCC) 07/10/2020   • Unexplained night sweats 07/10/2020   • Overweight (BMI 25.0-29.9) 08/23/2017   • CKD (chronic kidney disease) stage 3, GFR 30-59 ml/min ([facility]) 08/16/2017   • Chronic fatigue 08/30/2016   • History of breast cancer 01/12/2016   • Essential hypertension 01/12/2016   • Glaucoma 01/12/2016   • Mixed hyperlipidemia 01/12/2016   • Osteopenia 01/12/2016       Current Outpatient Medications   Medication Sig Dispense Refill   • metoprolol tartrate (LOPRESSOR) 25 MG Tab Take 0.5 Tablets by mouth 2 times a day. 100 tablet 3   • pravastatin (PRAVACHOL) 20 MG Tab TAKE ONE TABLET BY MOUTH AT BEDTIME 100 tablet 3   • amLODIPine (NORVASC) 5 MG Tab Take 1 tablet by mouth every day. 90 tablet 3   • vitamin D (CHOLECALCIFEROL) 1000 Unit (25 mcg) Tab Take 1,000 Units by mouth every day.     • calcium carbonate (OS-OMERO 500) 1250 MG Tab Take 1,250 mg by mouth 2 times a day, with meals.       No current facility-administered medications for this visit.        Patient is taking medications as noted in medication list.  Current supplements as per medication list.     Allergies: Patient has no known allergies.    Current social contact/activities:      Is patient current with immunizations? No, due for SHINGRIX (Shingles). Patient is interested in receiving TDAP today. also flu vaccine today , and Shigrex and COViD booster     She  reports that she quit smoking about 21 years ago. Her smoking use included cigarettes. She has a 2.50 pack-year smoking history. She has never used smokeless tobacco. She reports current alcohol use. She reports that she does not use drugs.  Counseling given: Not Answered        DPA/Advanced directive: Patient does not have an Advanced Directive.  A packet and workshop information was given  on Advanced Directives.    ROS:    Gait: Uses no assistive device   Ostomy: No   Other tubes: No   Amputations: No   Chronic oxygen use No   Last eye [id number] hearing aids: No   : Denies any urinary leakage during the last 6 months      Screening:    UTD    Depression Screening    Little interest or pleasure in doing things?  0 - not at all  Feeling down, depressed, or hopeless? 0 - not at all  Patient Health Questionnaire Score: 0    If depressive symptoms identified deferred to follow up visit unless specifically addressed in assessment and plan.    Interpretation of PHQ-9 Total Score   Score Severity   1-4 No Depression   5-9 Mild Depression   10-14 Moderate Depression   15-19 Moderately Severe Depression   20-27 Severe Depression    Screening for Cognitive Impairment    Three Minute Recall (captain, garden, picture)  3/3    Gael clock face with all 12 numbers and set the hands to show 5 past 8.  Yes    If cognitive concerns identified, deferred for follow up unless specifically addressed in assessment and plan.    Fall Risk Assessment    Has the patient had two or more falls in the last year or any fall with injury in the last year?     If fall risk identified, deferred for follow up unless specifically addressed in assessment and plan.    Safety Assessment    Throw rugs on floor.  No  Handrails on all stairs.  No  Good lighting in all hallways.  No  Difficulty hearing.  No  Patient counseled about all safety risks that were identified.    Functional Assessment ADLs    Are there any barriers preventing you from cooking for yourself or meeting nutritional needs?  No.    Are there any barriers preventing you from driving safely or obtaining transportation?  No.    Are there any barriers preventing you from using a telephone or calling for help?  No.    Are there any barriers preventing you from shopping?  No.    Are there any barriers preventing you from taking care of your own finances?  No.    Are there  any barriers preventing you from managing your medications?  No.    Are there any barriers preventing you from showering, bathing or dressing yourself?  No.    Are you currently engaging in any exercise or physical activity?  No.     What is your perception of your health?  Fair.    Health Maintenance Summary          Overdue - IMM ZOSTER VACCINES (2 of 3) Overdue since 12/25/2017    10/30/2017  Imm Admin: Zoster Vaccine Live (ZVL) (Zostavax) - HISTORICAL DATA          Ordered - IMM DTaP/Tdap/Td Vaccine (2 - Td or Tdap) Ordered on 11/18/2021 02/17/2011  Imm Admin: Tdap Vaccine          Overdue - Annual Wellness Visit (Every 366 Days) Overdue since 2/19/2021 02/19/2020  Done    02/19/2020  Visit Dx: Medicare annual wellness visit, subsequent    06/13/2018  Visit Dx: Medicare annual wellness visit, subsequent          Ordered - IMM INFLUENZA (1) Ordered on 11/18/2021 11/16/2020  Imm Admin: Influenza Vaccine Quad Inj (Pf)    11/12/2019  Imm Admin: Influenza Vaccine Adult HD    10/30/2018  Imm Admin: Influenza Vaccine Adult HD    10/30/2017  Imm Admin: Influenza Vaccine Adult HD    11/22/2016  Imm Admin: Influenza Vaccine Adult HD    Only the first 5 history entries have been loaded, but more history exists.          Overdue - COVID-19 Vaccine (3 - Booster for Moderna series) Overdue since 9/30/2021 03/30/2021  Imm Admin: Moderna SARS-CoV-2 Vaccine    03/02/2021  Imm Admin: Moderna SARS-CoV-2 Vaccine          MAMMOGRAM (Yearly) Tentatively due on 6/14/2022 06/14/2021  MA-SCREENING MAMMO LEFT W/TOMOSYNTHESIS W/CAD    06/12/2020  MA-SCREENING MAMMO BILAT W/TOMOSYNTHESIS W/CAD    06/06/2019  MA-SCREENING MAMMO BILAT W/TOMOSYNTHESIS W/CAD    05/24/2018  MA-MAMMO SCREENING BILAT W/PETE W/CAD    05/23/2017  MA-MAMMO DIAGNOSTIC UNILAT W/PETE W/CAD LEFT    Only the first 5 history entries have been loaded, but more history exists.          BONE DENSITY (Every 5 Years) Tentatively due on 9/16/2024 09/16/2019   DS-BONE DENSITY STUDY (DEXA)    2017  DS-BONE DENSITY STUDY (DEXA)          COLORECTAL CANCER SCREENING (COLONOSCOPY - Every 5 Years) Tentatively due on 2020  REFERRAL TO GI FOR COLONOSCOPY    2015  AMB REFERRAL TO GI FOR COLONOSCOPY          IMM PNEUMOCOCCAL VACCINE: 65+ Years (Series Information) Completed    2018  Imm Admin: Pneumococcal polysaccharide vaccine (PPSV-23)    2016  Imm Admin: Pneumococcal Conjugate Vaccine (Prevnar/PCV-13)          HEPATITIS C SCREENING  Completed    2020  HEP C VIRUS ANTIBODY          IMM HEP B VACCINE (Series Information) Aged Out    No completion history exists for this topic.          IMM MENINGOCOCCAL VACCINE (MCV4) (Series Information) Aged Out    No completion history exists for this topic.                Patient Care Team:  Tre Britton M.D. as PCP - General (Family Medicine)  Irma Garg M.D. as Consulting Physician (Surgery)  VALENTÍN Jay M.D. as Consulting Physician (Medical Oncology)  Gilberto Garcia M.D. as Consulting Physician (Ophthalmology)  Christine Carey M.D. as Consulting Physician (Dermatology)  Abelardo Gregorio Ass't as [facility]   Kezia Weeks M.D. (Cardiovascular Disease (Cardiology))  Mehran Hess M.D. (Gastroenterology)    Social History     Tobacco Use   • Smoking status: Former Smoker     Packs/day: 0.25     Years: 10.00     Pack years: 2.50     Types: Cigarettes     Quit date: 2000     Years since quittin.8   • Smokeless tobacco: Never Used   Vaping Use   • Vaping Use: Never used   Substance Use Topics   • Alcohol use: Yes     Comment: glass of wine    • Drug use: No     Family History   Problem Relation Age of Onset   • Heart Disease Father         quadriple bypass    • Cancer Sister         breast with mets   • Hypertension Mother    • Lung Disease Mother         smoker   • Arthritis Mother         RA   • Stroke Mother    • Thyroid Mother    •  "Diabetes Maternal Uncle         type I   • Cancer Maternal Grandmother         Colon   • Diabetes Maternal Grandfather         type II   • Arthritis Daughter    • No Known Problems Daughter    • No Known Problems Paternal Grandmother    • No Known Problems Paternal Grandfather      She  has a past medical history of Breast cancer ([facility]) (2013), Cataract, CKD (chronic kidney disease) stage 3, GFR 30-59 ml/min ([facility]) ( ), Gallstones, Glaucoma, Hashimoto's thyroiditis (7/22/2020), Hyperlipidemia, Hypertension, Malignant neoplasm of upper-outer quadrant of right breast in female, estrogen receptor positive ([facility]) (2013), Uncontrolled daytime somnolence (8/26/2020), and Unexplained night sweats (7/10/2020).   Past Surgical History:   Procedure Laterality Date   • HYSTERECTOMY LAPAROSCOPY  51 y/o    cervix and bilat ovaries removed.   • KNEE ARTHROSCOPY  60 y/o   • KNEE ARTHROSCOPY Right    • MASTECTOMY  age 62    right mastectomy , 9 years ago   • TONSILLECTOMY     • TONSILLECTOMY AND ADENOIDECTOMY  7 y/o           Exam:     /68   Pulse 76   Temp 35.9 °C (96.6 °F)   Resp 14   Ht 1.651 m (5' 5\")   Wt 81.9 kg (180 lb 9.6 oz)   SpO2 92%  Body mass index is 30.05 kg/m².    Hearing good.    Dentition good  Alert, oriented in no acute distress.  Eye contact is good, speech goal directed, affect calm      Physical Exam  Constitutional:       Appearance: Normal appearance.   HENT:      Head: Normocephalic and atraumatic.   Pulmonary:      Effort: Pulmonary effort is normal.      Breath sounds: Normal breath sounds.   Musculoskeletal:      Cervical back: Normal range of motion and neck supple.   Neurological:      Mental Status: She is alert.           Assessment and Plan. The following treatment and monitoring plan is recommended:    1. Essential hypertension   chronic well-controlled continue same regimen   2. Mixed hyperlipidemia   chronic controlled continue pravastatin  Comp Metabolic Panel   3. History of breast " cancer   chronic stable, continue follow-up with oncology.   4. Stage 3a chronic kidney disease (HCC)   chronic, stable.  Repeat kidney function test   5. Hashimoto's thyroiditis   chronic asymptomatic for symptoms of hypothyroidism, continue monitoring and follow-up with endocrinology   6. SOB (shortness of breath) on exertion   chronic unresolved, will do further assessment, pulmonary function tests CAT scan and follow-up with pulmonology.  No red flags or acute findings today, oxygen saturation 92% room air not in distress normal other vital signs  PULMONARY FUNCTION TESTS -Test requested: Complete Pulmonary Function Test    Referral to Pulmonary and Sleep Medicine    CT-CHEST (THORAX) W/O   7. Abnormal CBC measurement   chronic, probably having infection at that time, repeat CBC asymptomatic today  CBC WITH DIFFERENTIAL   8. Pain in other joint   chronic, new to me.  We will do autoimmune arthritis work-up.  For further evaluation  Sed Rate    ANTI-NUCLEAR ANTIBODY SERUM    RHEUMATOID ARTHRITIS FACTOR   9. Flu vaccine need  INFLUENZA VACCINE, HIGH DOSE (65+ ONLY)   10. Need for Tdap vaccination  Tdap =>8yo IM         Services suggested: No services needed at this time  Health Care Screening recommendations as per orders if indicated.  Referrals offered: PT/OT/Nutrition counseling/Behavioral Health/Smoking cessation as per orders if indicated.    Discussion today about general wellness and lifestyle habits:    · Prevent falls and reduce trip hazards; Cautioned about securing or removing rugs.  · Have a working fire alarm and carbon monoxide detector;   · Engage in regular physical activity and social activities       Follow-up: No follow-ups on file.

## 2021-11-18 NOTE — PROGRESS NOTES
Chief Complaint   Patient presents with   • Annual Wellness Visit         HPI: Established patient   Katie is a 71 y.o. here for Medicare Annual Wellness Visit    1. Essential hypertension      2. Mixed hyperlipidemia      3. History of breast cancer      4. Stage 3a chronic kidney disease       5. Hashimoto's thyroiditis      6. SOB (shortness of breath) on exertion      7. Abnormal CBC measurement      8. Pain in other joint        Patient Active Problem List    Diagnosis Date Noted   • Health care maintenance 05/20/2021   • Hypercalcemia 04/27/2021   • Non-toxic multinodular goiter 04/27/2021   • Uncontrolled daytime somnolence 08/26/2020   • Hashimoto's thyroiditis 07/22/2020   • Malignant neoplasm of upper-outer quadrant of right breast in female, estrogen receptor positive (HCC) 07/10/2020   • Unexplained night sweats 07/10/2020   • Overweight (BMI 25.0-29.9) 08/23/2017   • CKD (chronic kidney disease) stage 3, GFR 30-59 ml/min ([facility]) 08/16/2017   • Chronic fatigue 08/30/2016   • History of breast cancer 01/12/2016   • Essential hypertension 01/12/2016   • Glaucoma 01/12/2016   • Mixed hyperlipidemia 01/12/2016   • Osteopenia 01/12/2016       Current Outpatient Medications   Medication Sig Dispense Refill   • metoprolol tartrate (LOPRESSOR) 25 MG Tab Take 0.5 Tablets by mouth 2 times a day. 100 tablet 3   • pravastatin (PRAVACHOL) 20 MG Tab TAKE ONE TABLET BY MOUTH AT BEDTIME 100 tablet 3   • amLODIPine (NORVASC) 5 MG Tab Take 1 tablet by mouth every day. 90 tablet 3   • vitamin D (CHOLECALCIFEROL) 1000 Unit (25 mcg) Tab Take 1,000 Units by mouth every day.     • calcium carbonate (OS-OMERO 500) 1250 MG Tab Take 1,250 mg by mouth 2 times a day, with meals.       No current facility-administered medications for this visit.        Patient is taking medications as noted in medication list.  Current supplements as per medication list.     Allergies: Patient has no known allergies.    Current social contact/activities:       Is patient current with immunizations? No, due for SHINGRIX (Shingles). Patient is interested in receiving TDAP today. also flu vaccine today , and Shigrex and COViD booster     She  reports that she quit smoking about 21 years ago. Her smoking use included cigarettes. She has a 2.50 pack-year smoking history. She has never used smokeless tobacco. She reports current alcohol use. She reports that she does not use drugs.  Counseling given: Not Answered        DPA/Advanced directive: Patient does not have an Advanced Directive.  A packet and workshop information was given on Advanced Directives.    ROS:    Gait: Uses no assistive device   Ostomy: No   Other tubes: No   Amputations: No   Chronic oxygen use No   Last eye [id number] hearing aids: No   : Denies any urinary leakage during the last 6 months      Screening:    UTD    Depression Screening    Little interest or pleasure in doing things?  0 - not at all  Feeling down, depressed, or hopeless? 0 - not at all  Patient Health Questionnaire Score: 0    If depressive symptoms identified deferred to follow up visit unless specifically addressed in assessment and plan.    Interpretation of PHQ-9 Total Score   Score Severity   1-4 No Depression   5-9 Mild Depression   10-14 Moderate Depression   15-19 Moderately Severe Depression   20-27 Severe Depression    Screening for Cognitive Impairment    Three Minute Recall (captain, garden, picture)  3/3    Gael clock face with all 12 numbers and set the hands to show 5 past 8.  Yes    If cognitive concerns identified, deferred for follow up unless specifically addressed in assessment and plan.    Fall Risk Assessment    Has the patient had two or more falls in the last year or any fall with injury in the last year?     If fall risk identified, deferred for follow up unless specifically addressed in assessment and plan.    Safety Assessment    Throw rugs on floor.  No  Handrails on all stairs.  No  Good lighting in all  hallways.  No  Difficulty hearing.  No  Patient counseled about all safety risks that were identified.    Functional Assessment ADLs    Are there any barriers preventing you from cooking for yourself or meeting nutritional needs?  No.    Are there any barriers preventing you from driving safely or obtaining transportation?  No.    Are there any barriers preventing you from using a telephone or calling for help?  No.    Are there any barriers preventing you from shopping?  No.    Are there any barriers preventing you from taking care of your own finances?  No.    Are there any barriers preventing you from managing your medications?  No.    Are there any barriers preventing you from showering, bathing or dressing yourself?  No.    Are you currently engaging in any exercise or physical activity?  No.     What is your perception of your health?  Fair.    Health Maintenance Summary          Overdue - IMM ZOSTER VACCINES (2 of 3) Overdue since 12/25/2017    10/30/2017  Imm Admin: Zoster Vaccine Live (ZVL) (Zostavax) - HISTORICAL DATA          Overdue - IMM DTaP/Tdap/Td Vaccine (2 - Td or Tdap) Order placed this encounter    02/17/2011  Imm Admin: Tdap Vaccine          Overdue - Annual Wellness Visit (Every 366 Days) Overdue since 2/19/2021 02/19/2020  Done    02/19/2020  Visit Dx: Medicare annual wellness visit, subsequent    06/13/2018  Visit Dx: Medicare annual wellness visit, subsequent          Ordered - IMM INFLUENZA (1) Ordered on 11/18/2021 11/16/2020  Imm Admin: Influenza Vaccine Quad Inj (Pf)    11/12/2019  Imm Admin: Influenza Vaccine Adult HD    10/30/2018  Imm Admin: Influenza Vaccine Adult HD    10/30/2017  Imm Admin: Influenza Vaccine Adult HD    11/22/2016  Imm Admin: Influenza Vaccine Adult HD    Only the first 5 history entries have been loaded, but more history exists.          Overdue - COVID-19 Vaccine (3 - Booster for Moderna series) Overdue since 9/30/2021 03/30/2021  Imm Admin: Moderna  SARS-CoV-2 Vaccine    03/02/2021  Imm Admin: Moderna SARS-CoV-2 Vaccine          MAMMOGRAM (Yearly) Tentatively due on 6/14/2022 06/14/2021  MA-SCREENING MAMMO LEFT W/TOMOSYNTHESIS W/CAD    06/12/2020  MA-SCREENING MAMMO BILAT W/TOMOSYNTHESIS W/CAD    06/06/2019  MA-SCREENING MAMMO BILAT W/TOMOSYNTHESIS W/CAD    05/24/2018  MA-MAMMO SCREENING BILAT W/PETE W/CAD    05/23/2017  MA-MAMMO DIAGNOSTIC UNILAT W/PETE W/CAD LEFT    Only the first 5 history entries have been loaded, but more history exists.          BONE DENSITY (Every 5 Years) Tentatively due on 9/16/2024 09/16/2019  DS-BONE DENSITY STUDY (DEXA)    09/19/2017  DS-BONE DENSITY STUDY (DEXA)          COLORECTAL CANCER SCREENING (COLONOSCOPY - Every 5 Years) Tentatively due on 8/12/2025 08/12/2020  REFERRAL TO GI FOR COLONOSCOPY    05/28/2015  AMB REFERRAL TO GI FOR COLONOSCOPY          IMM PNEUMOCOCCAL VACCINE: 65+ Years (Series Information) Completed    06/13/2018  Imm Admin: Pneumococcal polysaccharide vaccine (PPSV-23)    08/17/2016  Imm Admin: Pneumococcal Conjugate Vaccine (Prevnar/PCV-13)          HEPATITIS C SCREENING  Completed    02/20/2020  HEP C VIRUS ANTIBODY          IMM HEP B VACCINE (Series Information) Aged Out    No completion history exists for this topic.          IMM MENINGOCOCCAL VACCINE (MCV4) (Series Information) Aged Out    No completion history exists for this topic.                Patient Care Team:  Tre Britton M.D. as PCP - General (Family Medicine)  Irma Garg M.D. as Consulting Physician (Surgery)  VALENTÍN Jay M.D. as Consulting Physician (Medical Oncology)  Gilberto Garcia M.D. as Consulting Physician (Ophthalmology)  Christine Carey M.D. as Consulting Physician (Dermatology)  Abelardo Gregorio Ass't as [facility]   Kezia Weeks M.D. (Cardiovascular Disease (Cardiology))  Mehran Hess M.D. (Gastroenterology)    Social History     Tobacco Use   • Smoking status: Former  "Smoker     Packs/day: 0.25     Years: 10.00     Pack years: 2.50     Types: Cigarettes     Quit date: 2000     Years since quittin.8   • Smokeless tobacco: Never Used   Vaping Use   • Vaping Use: Never used   Substance Use Topics   • Alcohol use: Yes     Comment: glass of wine    • Drug use: No     Family History   Problem Relation Age of Onset   • Heart Disease Father         quadriple bypass    • Cancer Sister         breast with mets   • Hypertension Mother    • Lung Disease Mother         smoker   • Arthritis Mother         RA   • Stroke Mother    • Thyroid Mother    • Diabetes Maternal Uncle         type I   • Cancer Maternal Grandmother         Colon   • Diabetes Maternal Grandfather         type II   • Arthritis Daughter    • No Known Problems Daughter    • No Known Problems Paternal Grandmother    • No Known Problems Paternal Grandfather      She  has a past medical history of Breast cancer ([facility]) (), Cataract, CKD (chronic kidney disease) stage 3, GFR 30-59 ml/min ([facility]) ( ), Gallstones, Glaucoma, Hashimoto's thyroiditis (2020), Hyperlipidemia, Hypertension, Malignant neoplasm of upper-outer quadrant of right breast in female, estrogen receptor positive ([facility]) (), Uncontrolled daytime somnolence (2020), and Unexplained night sweats (7/10/2020).   Past Surgical History:   Procedure Laterality Date   • HYSTERECTOMY LAPAROSCOPY  51 y/o    cervix and bilat ovaries removed.   • KNEE ARTHROSCOPY  60 y/o   • KNEE ARTHROSCOPY Right    • MASTECTOMY  age 62    right mastectomy , 9 years ago   • TONSILLECTOMY     • TONSILLECTOMY AND ADENOIDECTOMY  7 y/o           Exam:     /68   Pulse 76   Temp 35.9 °C (96.6 °F)   Resp 14   Ht 1.651 m (5' 5\")   Wt 81.9 kg (180 lb 9.6 oz)   SpO2 92%  Body mass index is 30.05 kg/m².    Hearing good.    Dentition good  Alert, oriented in no acute distress.  Eye contact is good, speech goal directed, affect calm      Assessment and Plan. The following " treatment and monitoring plan is recommended:    1. Essential hypertension     2. Mixed hyperlipidemia  Comp Metabolic Panel   3. History of breast cancer     4. Stage 3a chronic kidney disease (HCC)     5. Hashimoto's thyroiditis     6. SOB (shortness of breath) on exertion  PULMONARY FUNCTION TESTS -Test requested: Complete Pulmonary Function Test    Referral to Pulmonary and Sleep Medicine    CT-CHEST (THORAX) W/O   7. Abnormal CBC measurement  CBC WITH DIFFERENTIAL   8. Pain in other joint  Sed Rate    ANTI-NUCLEAR ANTIBODY SERUM    RHEUMATOID ARTHRITIS FACTOR   9. Flu vaccine need  INFLUENZA VACCINE, HIGH DOSE (65+ ONLY)   10. Need for Tdap vaccination           Services suggested: No services needed at this time  Health Care Screening recommendations as per orders if indicated.  Referrals offered: PT/OT/Nutrition counseling/Behavioral Health/Smoking cessation as per orders if indicated.    Discussion today about general wellness and lifestyle habits:    · Prevent falls and reduce trip hazards; Cautioned about securing or removing rugs.  · Have a working fire alarm and carbon monoxide detector;   · Engage in regular physical activity and social activities       Follow-up: No follow-ups on file.

## 2021-11-19 ENCOUNTER — HOSPITAL ENCOUNTER (OUTPATIENT)
Dept: LAB | Facility: MEDICAL CENTER | Age: 71
End: 2021-11-19
Attending: FAMILY MEDICINE
Payer: MEDICARE

## 2021-11-19 DIAGNOSIS — M25.59 PAIN IN OTHER JOINT: ICD-10-CM

## 2021-11-19 DIAGNOSIS — R79.89 ABNORMAL CBC MEASUREMENT: ICD-10-CM

## 2021-11-19 DIAGNOSIS — E78.2 MIXED HYPERLIPIDEMIA: ICD-10-CM

## 2021-11-19 LAB
ALBUMIN SERPL BCP-MCNC: 4.6 G/DL (ref 3.2–4.9)
ALBUMIN/GLOB SERPL: 1.9 G/DL
ALP SERPL-CCNC: 58 U/L (ref 30–99)
ALT SERPL-CCNC: 32 U/L (ref 2–50)
ANION GAP SERPL CALC-SCNC: 10 MMOL/L (ref 7–16)
AST SERPL-CCNC: 22 U/L (ref 12–45)
BASOPHILS # BLD AUTO: 0.9 % (ref 0–1.8)
BASOPHILS # BLD: 0.06 K/UL (ref 0–0.12)
BILIRUB SERPL-MCNC: 0.7 MG/DL (ref 0.1–1.5)
BUN SERPL-MCNC: 16 MG/DL (ref 8–22)
CALCIUM SERPL-MCNC: 9.9 MG/DL (ref 8.5–10.5)
CHLORIDE SERPL-SCNC: 105 MMOL/L (ref 96–112)
CO2 SERPL-SCNC: 26 MMOL/L (ref 20–33)
CREAT SERPL-MCNC: 1.02 MG/DL (ref 0.5–1.4)
EOSINOPHIL # BLD AUTO: 0.18 K/UL (ref 0–0.51)
EOSINOPHIL NFR BLD: 2.7 % (ref 0–6.9)
ERYTHROCYTE [DISTWIDTH] IN BLOOD BY AUTOMATED COUNT: 41.4 FL (ref 35.9–50)
ERYTHROCYTE [SEDIMENTATION RATE] IN BLOOD BY WESTERGREN METHOD: 5 MM/HOUR (ref 0–25)
FASTING STATUS PATIENT QL REPORTED: NORMAL
GLOBULIN SER CALC-MCNC: 2.4 G/DL (ref 1.9–3.5)
GLUCOSE SERPL-MCNC: 103 MG/DL (ref 65–99)
HCT VFR BLD AUTO: 47.5 % (ref 37–47)
HGB BLD-MCNC: 15.3 G/DL (ref 12–16)
IMM GRANULOCYTES # BLD AUTO: 0.02 K/UL (ref 0–0.11)
IMM GRANULOCYTES NFR BLD AUTO: 0.3 % (ref 0–0.9)
LYMPHOCYTES # BLD AUTO: 1.08 K/UL (ref 1–4.8)
LYMPHOCYTES NFR BLD: 15.9 % (ref 22–41)
MCH RBC QN AUTO: 29.1 PG (ref 27–33)
MCHC RBC AUTO-ENTMCNC: 32.2 G/DL (ref 33.6–35)
MCV RBC AUTO: 90.5 FL (ref 81.4–97.8)
MONOCYTES # BLD AUTO: 0.76 K/UL (ref 0–0.85)
MONOCYTES NFR BLD AUTO: 11.2 % (ref 0–13.4)
NEUTROPHILS # BLD AUTO: 4.69 K/UL (ref 2–7.15)
NEUTROPHILS NFR BLD: 69 % (ref 44–72)
NRBC # BLD AUTO: 0 K/UL
NRBC BLD-RTO: 0 /100 WBC
PLATELET # BLD AUTO: 270 K/UL (ref 164–446)
PMV BLD AUTO: 9.7 FL (ref 9–12.9)
POTASSIUM SERPL-SCNC: 4.4 MMOL/L (ref 3.6–5.5)
PROT SERPL-MCNC: 7 G/DL (ref 6–8.2)
RBC # BLD AUTO: 5.25 M/UL (ref 4.2–5.4)
RHEUMATOID FACT SER IA-ACNC: <10 IU/ML (ref 0–14)
SODIUM SERPL-SCNC: 141 MMOL/L (ref 135–145)
WBC # BLD AUTO: 6.8 K/UL (ref 4.8–10.8)

## 2021-11-19 PROCEDURE — 80053 COMPREHEN METABOLIC PANEL: CPT

## 2021-11-19 PROCEDURE — 86038 ANTINUCLEAR ANTIBODIES: CPT

## 2021-11-19 PROCEDURE — 86431 RHEUMATOID FACTOR QUANT: CPT

## 2021-11-19 PROCEDURE — 85025 COMPLETE CBC W/AUTO DIFF WBC: CPT

## 2021-11-19 PROCEDURE — 85652 RBC SED RATE AUTOMATED: CPT

## 2021-11-19 PROCEDURE — 36415 COLL VENOUS BLD VENIPUNCTURE: CPT

## 2021-11-22 LAB — NUCLEAR IGG SER QL IA: NORMAL

## 2021-11-24 ENCOUNTER — HOSPITAL ENCOUNTER (OUTPATIENT)
Dept: RADIOLOGY | Facility: MEDICAL CENTER | Age: 71
End: 2021-11-24
Attending: FAMILY MEDICINE
Payer: MEDICARE

## 2021-11-24 DIAGNOSIS — R06.02 SOB (SHORTNESS OF BREATH) ON EXERTION: ICD-10-CM

## 2021-11-24 PROCEDURE — 71250 CT THORAX DX C-: CPT | Mod: ME

## 2021-11-30 DIAGNOSIS — K76.9 LIVER DISEASE: ICD-10-CM

## 2021-11-30 DIAGNOSIS — K76.9 HEPATIC LESION: ICD-10-CM

## 2021-12-07 ENCOUNTER — HOSPITAL ENCOUNTER (OUTPATIENT)
Dept: RADIOLOGY | Facility: MEDICAL CENTER | Age: 71
End: 2021-12-07
Attending: FAMILY MEDICINE
Payer: MEDICARE

## 2021-12-07 DIAGNOSIS — K76.9 LIVER DISEASE: ICD-10-CM

## 2021-12-07 DIAGNOSIS — K76.9 HEPATIC LESION: ICD-10-CM

## 2021-12-07 PROCEDURE — 74183 MRI ABD W/O CNTR FLWD CNTR: CPT | Mod: ME

## 2021-12-07 PROCEDURE — 700117 HCHG RX CONTRAST REV CODE 255: Performed by: FAMILY MEDICINE

## 2021-12-07 PROCEDURE — A9576 INJ PROHANCE MULTIPACK: HCPCS | Performed by: FAMILY MEDICINE

## 2021-12-07 RX ADMIN — GADOTERIDOL 20 ML: 279.3 INJECTION, SOLUTION INTRAVENOUS at 18:44

## 2021-12-30 ENCOUNTER — HOSPITAL ENCOUNTER (OUTPATIENT)
Dept: PULMONOLOGY | Facility: MEDICAL CENTER | Age: 71
End: 2021-12-30
Attending: FAMILY MEDICINE
Payer: MEDICARE

## 2021-12-30 DIAGNOSIS — R06.02 SOB (SHORTNESS OF BREATH) ON EXERTION: ICD-10-CM

## 2021-12-30 PROCEDURE — 94729 DIFFUSING CAPACITY: CPT

## 2021-12-30 PROCEDURE — 94729 DIFFUSING CAPACITY: CPT | Mod: 26 | Performed by: INTERNAL MEDICINE

## 2021-12-30 PROCEDURE — 94060 EVALUATION OF WHEEZING: CPT | Mod: 26 | Performed by: INTERNAL MEDICINE

## 2021-12-30 PROCEDURE — 94060 EVALUATION OF WHEEZING: CPT

## 2021-12-30 PROCEDURE — 94726 PLETHYSMOGRAPHY LUNG VOLUMES: CPT

## 2021-12-30 PROCEDURE — 94726 PLETHYSMOGRAPHY LUNG VOLUMES: CPT | Mod: 26 | Performed by: INTERNAL MEDICINE

## 2021-12-30 RX ORDER — ALBUTEROL SULFATE 90 UG/1
2 AEROSOL, METERED RESPIRATORY (INHALATION)
Status: DISCONTINUED | OUTPATIENT
Start: 2021-12-30 | End: 2021-12-31 | Stop reason: HOSPADM

## 2021-12-30 ASSESSMENT — PULMONARY FUNCTION TESTS
FVC_LLN: 2.46
FEV1/FVC_PERCENT_PREDICTED: 101
FEV1/FVC: 79
FVC_LLN: 2.46
FVC_PERCENT_PREDICTED: 94
FEV1/FVC_PERCENT_LLN: 65.13
FEV1/FVC: 79.17
FEV1: 2.22
FEV1/FVC_PERCENT_CHANGE: 0
FVC_PREDICTED: 2.95
FEV1: 2.24
FVC_PERCENT_PREDICTED: 92
FEV1_LLN: 1.91
FEV1/FVC_PREDICTED: 78.0
FEV1_LLN: 1.91
FEV1/FVC: 82.35
FEV1_PERCENT_CHANGE: 0
FEV1/FVC_PERCENT_PREDICTED: 103
FEV1_PERCENT_CHANGE: -2
FEV1/FVC_PERCENT_CHANGE: 3
FEV1_PREDICTED: 2.28
FEV1/FVC_PERCENT_PREDICTED: 105
FEV1_PERCENT_PREDICTED: 97
FVC: 2.8
FEV1/FVC: 82.10
FEV1/FVC_PERCENT_PREDICTED: 105
FEV1/FVC_PERCENT_PREDICTED: 77
FEV1_PERCENT_PREDICTED: 97
FVC: 2.72
FEV1/FVC_PERCENT_LLN: 65.13

## 2021-12-31 NOTE — PROCEDURES
DATE OF SERVICE:  12/30/2021     PULMONARY FUNCTION TEST INTERPRETATION     REFERRING PHYSICIAN:  ____.     INTERPRETING PHYSICIAN:  Nina Parr MD     REASON FOR STUDY:  Shortness of breath.     STUDY ADEQUACY:  Good efforts.  The patient met ATS standards by flow volume   loop and expiratory time.     RESULTS:  SPIROMETRY:  FVC is 2.80, which is 94% of predicted with no significant change   post bronchodilator.  FEV1 is 2.22, which is 97% of predicted with no significant change post   bronchodilator.  FEV1/FVC is 79.17.     LUNG VOLUMES:  TLC is 4.77, which is 91% of predicted.  RV is 1.97, which is 87% of predicted.     DIFFUSION CAPACITY:  DLCO is 18.14, which is 90% of predicted.  DL/VA is 3.84, which is 99% of predicted.     INTERPRETATION:  1.  Spirometry is normal.  2.  There is no significant post-bronchodilator response.  3.  The flow volume loop is consistent with the spirometry data.  4.  Lung volumes are normal.  5.  Diffusion capacity is normal.  6.  There are no prior studies for comparison.        ______________________________  MD ALICIA Davenport/JANETH/JEFF    DD:  12/31/2021 13:58  DT:  12/31/2021 14:46    Job#:  [id number]

## 2022-01-11 ENCOUNTER — TELEPHONE (OUTPATIENT)
Dept: MEDICAL GROUP | Facility: LAB | Age: 72
End: 2022-01-11

## 2022-01-11 RX ORDER — ALPRAZOLAM 1 MG/1
TABLET ORAL
COMMUNITY
End: 2022-04-25

## 2022-01-11 NOTE — TELEPHONE ENCOUNTER
ESTABLISHED PATIENT PRE-VISIT PLANNING     Patient was NOT contacted to complete PVP.     Note: Patient will not be contacted if there is no indication to call.     1.  Reviewed notes from the last few office visits within the medical group: Yes    2.  If any orders were placed at last visit or intended to be done for this visit (i.e. 6 mos follow-up), do we have Results/Consult Notes?         •  Labs - Labs ordered, completed on 11/19/2021 and results are in chart.  Note: If patient appointment is for lab review and patient did not complete labs, check with provider if OK to reschedule patient until labs completed.       •  Imaging - Imaging ordered, completed and results are in chart.       •  Referrals - Referral ordered, patient was seen and consult notes are in chart. Care Teams updated  YES.    3. Is this appointment scheduled as a Hospital Follow-Up? No    4.  Immunizations were updated in Epic using Reconcile Outside Information activity? Yes    5.  Patient is due for the following Health Maintenance Topics:   Health Maintenance Due   Topic Date Due   • IMM ZOSTER VACCINES (2 of 3) 12/25/2017   • COVID-19 Vaccine (3 - Booster for Moderna series) 09/30/2021         6.  AHA (Pulse8) form printed for Provider? Yes

## 2022-01-18 ENCOUNTER — OFFICE VISIT (OUTPATIENT)
Dept: MEDICAL GROUP | Facility: LAB | Age: 72
End: 2022-01-18
Payer: MEDICARE

## 2022-01-18 VITALS
HEIGHT: 65 IN | TEMPERATURE: 97.1 F | BODY MASS INDEX: 29.66 KG/M2 | RESPIRATION RATE: 16 BRPM | DIASTOLIC BLOOD PRESSURE: 60 MMHG | OXYGEN SATURATION: 94 % | HEART RATE: 76 BPM | WEIGHT: 178 LBS | SYSTOLIC BLOOD PRESSURE: 100 MMHG

## 2022-01-18 DIAGNOSIS — M25.59 PAIN IN OTHER JOINT: ICD-10-CM

## 2022-01-18 DIAGNOSIS — N18.31 STAGE 3A CHRONIC KIDNEY DISEASE: ICD-10-CM

## 2022-01-18 DIAGNOSIS — G89.29 CHRONIC PAIN OF RIGHT KNEE: ICD-10-CM

## 2022-01-18 DIAGNOSIS — R06.02 SOB (SHORTNESS OF BREATH): ICD-10-CM

## 2022-01-18 DIAGNOSIS — M25.561 CHRONIC PAIN OF RIGHT KNEE: ICD-10-CM

## 2022-01-18 PROCEDURE — 99214 OFFICE O/P EST MOD 30 MIN: CPT | Performed by: FAMILY MEDICINE

## 2022-01-18 ASSESSMENT — FIBROSIS 4 INDEX: FIB4 SCORE: 1.01

## 2022-01-18 NOTE — PROGRESS NOTES
Subjective:     Lauren Jiang is a 71 y.o. female here today for  and Annual Health Assessment.    No problem-specific Assessment & Plan notes found for this encounter.       Health Maintenance Summary          Overdue - IMM ZOSTER VACCINES (2 of 3) Overdue since 12/25/2017    10/30/2017  Imm Admin: Zoster Vaccine Live (ZVL) (Zostavax) - HISTORICAL DATA          Overdue - COVID-19 Vaccine (3 - Booster for Moderna series) Overdue since 8/30/2021 03/30/2021  Imm Admin: Moderna SARS-CoV-2 Vaccine    03/02/2021  Imm Admin: Moderna SARS-CoV-2 Vaccine          MAMMOGRAM (Yearly) Tentatively due on 6/14/2022 06/14/2021  MA-SCREENING MAMMO LEFT W/TOMOSYNTHESIS W/CAD    06/12/2020  MA-SCREENING MAMMO BILAT W/TOMOSYNTHESIS W/CAD    06/06/2019  MA-SCREENING MAMMO BILAT W/TOMOSYNTHESIS W/CAD    05/24/2018  MA-MAMMO SCREENING BILAT W/PETE W/CAD    05/23/2017  MA-MAMMO DIAGNOSTIC UNILAT W/PETE W/CAD LEFT    Only the first 5 history entries have been loaded, but more history exists.          Annual Wellness Visit (Every 366 Days) Next due on 11/19/2022 11/18/2021  Done    02/19/2020  Done    02/19/2020  Visit Dx: Medicare annual wellness visit, subsequent    06/13/2018  Visit Dx: Medicare annual wellness visit, subsequent          BONE DENSITY (Every 5 Years) Tentatively due on 9/16/2024 09/16/2019  DS-BONE DENSITY STUDY (DEXA)    09/19/2017  DS-BONE DENSITY STUDY (DEXA)          COLORECTAL CANCER SCREENING (COLONOSCOPY - Every 5 Years) Tentatively due on 8/12/2025 08/12/2020  REFERRAL TO GI FOR COLONOSCOPY    05/28/2015  AMB REFERRAL TO GI FOR COLONOSCOPY          IMM DTaP/Tdap/Td Vaccine (3 - Td or Tdap) Next due on 11/18/2031 11/18/2021  Imm Admin: Tdap Vaccine    02/17/2011  Imm Admin: Tdap Vaccine          IMM PNEUMOCOCCAL VACCINE: 65+ Years (Series Information) Completed    06/13/2018  Imm Admin: Pneumococcal polysaccharide vaccine (PPSV-23)    08/17/2016  Imm Admin: Pneumococcal Conjugate Vaccine  (Prevnar/PCV-13)          HEPATITIS C SCREENING  Completed    02/20/2020  HEP C VIRUS ANTIBODY          IMM INFLUENZA (Series Information) Completed    11/18/2021  Imm Admin: Influenza Vaccine Adult HD    11/16/2020  Imm Admin: Influenza Vaccine Quad Inj (Pf)    11/12/2019  Imm Admin: Influenza Vaccine Adult HD    10/30/2018  Imm Admin: Influenza Vaccine Adult HD    10/30/2017  Imm Admin: Influenza Vaccine Adult HD    Only the first 5 history entries have been loaded, but more history exists.          IMM HEP B VACCINE (Series Information) Aged Out    No completion history exists for this topic.          IMM MENINGOCOCCAL VACCINE (MCV4) (Series Information) Aged Out    No completion history exists for this topic.                 Annual Health Assessment Questions:     1.  Are you currently engaging in any exercise or physical activity? No    2.  How would you describe your mood or emotional well-being today? good    3.  Have you had any falls in the last year? No    4.  Have you noticed any problems with your balance or had difficulty walking? No    5.  In the last six months have you experienced any leakage of urine? No    6. DPA/Advanced Directive: Patient has Advanced Directive on file.     Current medicines (including changes today)  Current Outpatient Medications   Medication Sig Dispense Refill   • ALPRAZolam (XANAX) 1 MG Tab      • metoprolol tartrate (LOPRESSOR) 25 MG Tab Take 0.5 Tablets by mouth 2 times a day. 100 tablet 3   • pravastatin (PRAVACHOL) 20 MG Tab TAKE ONE TABLET BY MOUTH AT BEDTIME 100 tablet 3   • amLODIPine (NORVASC) 5 MG Tab Take 1 tablet by mouth every day. 90 tablet 3   • vitamin D (CHOLECALCIFEROL) 1000 Unit (25 mcg) Tab Take 1,000 Units by mouth every day.     • calcium carbonate (OS-OMERO 500) 1250 MG Tab Take 1,250 mg by mouth 2 times a day, with meals.       No current facility-administered medications for this visit.       She  has a past medical history of Breast cancer (HCC)  (2013), Cataract, CKD (chronic kidney disease) stage 3, GFR 30-59 ml/min (HCC) ( ), Gallstones, Glaucoma, Hashimoto's thyroiditis (7/22/2020), Hyperlipidemia, Hypertension, Malignant neoplasm of upper-outer quadrant of right breast in female, estrogen receptor positive (HCC) (2013), Uncontrolled daytime somnolence (8/26/2020), and Unexplained night sweats (7/10/2020).    Patient has no known allergies.    She  reports that she quit smoking about 22 years ago. Her smoking use included cigarettes. She has a 2.50 pack-year smoking history. She has never used smokeless tobacco. She reports current alcohol use. She reports that she does not use drugs.  Counseling given: Not Answered      ROS   No chest pain, no shortness of breath, no abdominal pain.     Objective:     Physical Exam:  There were no vitals taken for this visit. There is no height or weight on file to calculate BMI.   Constitutional: Alert, no distress.  Skin: Warm, dry, good turgor, no rashes in visible areas.  Eye: Equal, round and reactive, conjunctiva clear, lids normal.  ENMT: Lips without lesions, good dentition, oropharynx clear.  Neck: Trachea midline, no masses, no thyromegaly. No cervical or supraclavicular lymphadenopathy.  Respiratory: Unlabored respiratory effort, lungs clear to auscultation, no wheezes, no rhonchi.  Cardiovascular: Normal S1, S2, no murmur, no edema.  Abdomen: Soft, non-tender, no masses, no hepatosplenomegaly.  Psych: Alert and oriented x3, normal affect and mood.    Assessment and Plan:     There are no diagnoses linked to this encounter.    Discussion today about general wellness and lifestyle habits:    · Engage in regular physical activity and social activities.  · Prevent falls and reduce trip hazards; using ambulatory aides, hearing and vision testing if appropriate.  · Steps to improve urinary incontinence.  · Advanced care planning.    Follow-Up: No follow-ups on file.         PLEASE NOTE: This dictation was created  using voice recognition software. I have made every reasonable attempt to correct obvious errors, but I expect that there are errors of grammar and possibly content that I did not discover before finalizing the note.

## 2022-01-18 NOTE — PROGRESS NOTES
Chief Complaint:   Chief Complaint   Patient presents with   • Follow-Up     2 MO FV       HPI: Established patient  Lauren Jiang is a 71 y.o. female who presents for follow-up, discussed the following today:    1. Stage 3a chronic kidney disease   History of chronic kidney disease, stable no concerns most recent kidney function test with GFR at baseline 54.  Patient is asymptomatic    2. Chronic pain of right knee  Chronic pain on the right knee, patient says she has a history of arthroscopy on that knee.  Requesting referral to see her orthopedics.  Declines physical therapy at this time her x-rays  3. Pain in other joint  Patient reports overall she has hands pain and overall joint pain, all systemic arthritis work-up that was done recently came as negative.  Patient with history of Hashimoto thyroiditis and she is going to check her thyroid function with her endocrinology soon.  She denies her joint pain to affect her daily functional activity.  She actually does sewing doing  and other activity fine.    4. SOB (shortness of breath)  Chronic problem, all cardiac work-up and follow-up with cardiology revealed no significant findings, pulmonary function test within normal limits and she has an appointment to establish with pulmonology soon.  Patient said it is mild and sometimes associated with some anxiety, discussed with the patient possibility of panic attack.  No red flag signs or symptoms like lower extremity edema or chest pain or palpitations.            Past medical history, family history, social history and medications reviewed and updated in the record.  Today  Current medications, problem list and allergies reviewed in EPIC today  Health maintenance topics are reviewed and updated.    Patient Active Problem List    Diagnosis Date Noted   • Health care maintenance 05/20/2021   • Hypercalcemia 04/27/2021   • Non-toxic multinodular goiter 04/27/2021   • Uncontrolled daytime somnolence 08/26/2020   •  Hashimoto's thyroiditis 2020   • Malignant neoplasm of upper-outer quadrant of right breast in female, estrogen receptor positive (HCC) 07/10/2020   • Unexplained night sweats 07/10/2020   • Overweight (BMI 25.0-29.9) 2017   • CKD (chronic kidney disease) stage 3, GFR 30-59 ml/min ([facility]) 2017   • Chronic fatigue 2016   • History of breast cancer 2016   • Essential hypertension 2016   • Glaucoma 2016   • Mixed hyperlipidemia 2016   • Osteopenia 2016     Family History   Problem Relation Age of Onset   • Heart Disease Father         quadriple bypass    • Cancer Sister         breast with mets   • Hypertension Mother    • Lung Disease Mother         smoker   • Arthritis Mother         RA   • Stroke Mother    • Thyroid Mother    • Diabetes Maternal Uncle         type I   • Cancer Maternal Grandmother         Colon   • Diabetes Maternal Grandfather         type II   • Arthritis Daughter    • No Known Problems Daughter    • No Known Problems Paternal Grandmother    • No Known Problems Paternal Grandfather      Social History     Socioeconomic History   • Marital status:      Spouse name: Not on file   • Number of children: Not on file   • Years of education: Not on file   • Highest education level: Associate degree: occupational, technical, or vocational program   Occupational History     Comment: Retired LPN   Tobacco Use   • Smoking status: Former Smoker     Packs/day: 0.25     Years: 10.00     Pack years: 2.50     Types: Cigarettes     Quit date: 2000     Years since quittin.0   • Smokeless tobacco: Never Used   Vaping Use   • Vaping Use: Never used   Substance and Sexual Activity   • Alcohol use: Yes     Comment: glass of wine    • Drug use: No   • Sexual activity: Yes     Partners: 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: ; retired LPN   Other Topics Concern   • Not on file   Social History Narrative   • Not on file     Social Determinants of Health     Financial  Resource Strain:    • Difficulty of Paying Living Expenses: Not on file   Food Insecurity:    • Worried About Running Out of Food in the Last Year: Not on file   • Ran Out of Food in the Last Year: Not on file   Transportation Needs:    • Lack of Transportation (Medical): Not on file   • Lack of Transportation (Non-Medical): Not on file   Physical Activity:    • Days of Exercise per Week: Not on file   • Minutes of Exercise per Session: Not on file   Stress:    • Feeling of Stress : Not on file   Social Connections:    • Frequency of Communication with Friends and Family: Not on file   • Frequency of Social Gatherings with Friends and Family: Not on file   • Attends [employer] Services: Not on file   • Active Member of Clubs or Organizations: Not on file   • Attends Club or Organization Meetings: Not on file   • Marital Status: Not on file   Intimate Partner Violence:    • Fear of Current or Ex-Partner: Not on file   • Emotionally Abused: Not on file   • Physically Abused: Not on file   • Sexually Abused: Not on file   Housing Stability:    • Unable to Pay for Housing in the Last Year: Not on file   • Number of Places Lived in the Last Year: Not on file   • Unstable Housing in the Last Year: Not on file       Current Outpatient Medications   Medication Sig Dispense Refill   • ALPRAZolam (XANAX) 1 MG Tab      • metoprolol tartrate (LOPRESSOR) 25 MG Tab Take 0.5 Tablets by mouth 2 times a day. 100 tablet 3   • pravastatin (PRAVACHOL) 20 MG Tab TAKE ONE TABLET BY MOUTH AT BEDTIME 100 tablet 3   • amLODIPine (NORVASC) 5 MG Tab Take 1 tablet by mouth every day. 90 tablet 3   • vitamin D (CHOLECALCIFEROL) 1000 Unit (25 mcg) Tab Take 1,000 Units by mouth every day.     • calcium carbonate (OS-OMERO 500) 1250 MG Tab Take 1,250 mg by mouth 2 times a day, with meals.       No current facility-administered medications for this visit.         Review Of Systems  As documented in HPI above  PHYSICAL EXAMINATION:    /60 (BP  "Location: Left arm, Patient Position: Sitting, BP Cuff Size: Adult)   Pulse 76   Temp 36.2 °C (97.1 °F) (Temporal)   Resp 16   Ht 1.651 m (5' 5\")   Wt 80.7 kg (178 lb)   SpO2 94%   BMI 29.62 kg/m²   Gen.: Well-developed, well-nourished, no apparent distress, pleasant and cooperative with the examination  HEENT: Normocephalic/atraumatic,     Neck: No JVD or bruits, no adenopathy  Cor: Regular rate and rhythm without murmur gallop or rub  Lungs: Clear to auscultation with equal breath sounds bilaterally. No wheezes, rhonchi.  Abdomen: Soft nontender without hepatosplenomegaly or masses appreciated, normoactive bowel sounds  Extremities: No cyanosis, clubbing or edema          ASSESSMENT/Plan:  1. Stage 3a chronic kidney disease (HCC)   chronic stable no concerns, continue hydration and avoid all nephrotoxic medications like NSAIDs.   2. Chronic pain of right knee   chronic, unresolved, uncontrolled pain with some signs of joint instability, requesting referral to see her orthopedics  Referral to Orthopedics   3. Pain in other joint   chronic, work-up for systemic arthritis is negative, advised well hydration thyroid function check.  Continue vitamin D supplements.   4. SOB (shortness of breath)   chronic, no red flag symptoms, follow-up with pulmonology as directed.  All cardiac work-up and pulmonary function test without significant findings, discussed with the patient possibly related to an anxiety       Please note that this dictation was created using voice recognition software. I have made every reasonable attempt to correct obvious errors but there may be errors of grammar and content that I may have overlooked prior to finalization of this note.       "

## 2022-02-04 ENCOUNTER — HOSPITAL ENCOUNTER (OUTPATIENT)
Dept: LAB | Facility: MEDICAL CENTER | Age: 72
End: 2022-02-04
Attending: INTERNAL MEDICINE
Payer: MEDICARE

## 2022-02-04 DIAGNOSIS — M85.80 OSTEOPENIA, UNSPECIFIED LOCATION: ICD-10-CM

## 2022-02-04 DIAGNOSIS — E04.2 NON-TOXIC MULTINODULAR GOITER: ICD-10-CM

## 2022-02-04 DIAGNOSIS — E06.3 HASHIMOTO'S THYROIDITIS: ICD-10-CM

## 2022-02-04 DIAGNOSIS — E83.52 HYPERCALCEMIA: ICD-10-CM

## 2022-02-04 LAB
25(OH)D3 SERPL-MCNC: 59 NG/ML (ref 30–100)
ALBUMIN SERPL BCP-MCNC: 4.9 G/DL (ref 3.2–4.9)
ALBUMIN/GLOB SERPL: 1.8 G/DL
ALP SERPL-CCNC: 67 U/L (ref 30–99)
ALT SERPL-CCNC: 26 U/L (ref 2–50)
ANION GAP SERPL CALC-SCNC: 12 MMOL/L (ref 7–16)
AST SERPL-CCNC: 22 U/L (ref 12–45)
BILIRUB SERPL-MCNC: 0.6 MG/DL (ref 0.1–1.5)
BUN SERPL-MCNC: 24 MG/DL (ref 8–22)
CALCIUM SERPL-MCNC: 10.2 MG/DL (ref 8.5–10.5)
CHLORIDE SERPL-SCNC: 104 MMOL/L (ref 96–112)
CO2 SERPL-SCNC: 24 MMOL/L (ref 20–33)
CREAT SERPL-MCNC: 1.1 MG/DL (ref 0.5–1.4)
GLOBULIN SER CALC-MCNC: 2.7 G/DL (ref 1.9–3.5)
GLUCOSE SERPL-MCNC: 93 MG/DL (ref 65–99)
PHOSPHATE SERPL-MCNC: 3.7 MG/DL (ref 2.5–4.5)
POTASSIUM SERPL-SCNC: 4 MMOL/L (ref 3.6–5.5)
PROT SERPL-MCNC: 7.6 G/DL (ref 6–8.2)
PTH-INTACT SERPL-MCNC: 46.3 PG/ML (ref 14–72)
SODIUM SERPL-SCNC: 140 MMOL/L (ref 135–145)
T4 FREE SERPL-MCNC: 1.2 NG/DL (ref 0.93–1.7)
TSH SERPL DL<=0.005 MIU/L-ACNC: 1.22 UIU/ML (ref 0.38–5.33)

## 2022-02-04 PROCEDURE — 82306 VITAMIN D 25 HYDROXY: CPT

## 2022-02-04 PROCEDURE — 84439 ASSAY OF FREE THYROXINE: CPT

## 2022-02-04 PROCEDURE — 84100 ASSAY OF PHOSPHORUS: CPT

## 2022-02-04 PROCEDURE — 83970 ASSAY OF PARATHORMONE: CPT

## 2022-02-04 PROCEDURE — 84443 ASSAY THYROID STIM HORMONE: CPT

## 2022-02-04 PROCEDURE — 80053 COMPREHEN METABOLIC PANEL: CPT

## 2022-02-04 PROCEDURE — 36415 COLL VENOUS BLD VENIPUNCTURE: CPT

## 2022-02-10 ENCOUNTER — PATIENT MESSAGE (OUTPATIENT)
Dept: HEALTH INFORMATION MANAGEMENT | Facility: OTHER | Age: 72
End: 2022-02-10
Payer: MEDICARE

## 2022-02-14 ENCOUNTER — OFFICE VISIT (OUTPATIENT)
Dept: ENDOCRINOLOGY | Facility: MEDICAL CENTER | Age: 72
End: 2022-02-14
Attending: INTERNAL MEDICINE
Payer: MEDICARE

## 2022-02-14 VITALS
DIASTOLIC BLOOD PRESSURE: 80 MMHG | HEART RATE: 107 BPM | WEIGHT: 178 LBS | SYSTOLIC BLOOD PRESSURE: 122 MMHG | BODY MASS INDEX: 29.66 KG/M2 | OXYGEN SATURATION: 94 % | HEIGHT: 65 IN

## 2022-02-14 DIAGNOSIS — Z78.0 POSTMENOPAUSAL: ICD-10-CM

## 2022-02-14 DIAGNOSIS — E04.2 NON-TOXIC MULTINODULAR GOITER: ICD-10-CM

## 2022-02-14 DIAGNOSIS — E06.3 HASHIMOTO'S THYROIDITIS: ICD-10-CM

## 2022-02-14 DIAGNOSIS — M85.80 OSTEOPENIA, UNSPECIFIED LOCATION: ICD-10-CM

## 2022-02-14 PROCEDURE — 99211 OFF/OP EST MAY X REQ PHY/QHP: CPT | Performed by: INTERNAL MEDICINE

## 2022-02-14 PROCEDURE — 99214 OFFICE O/P EST MOD 30 MIN: CPT | Performed by: INTERNAL MEDICINE

## 2022-02-14 RX ORDER — LEVOTHYROXINE SODIUM 0.03 MG/1
25 TABLET ORAL
Qty: 90 TABLET | Refills: 1 | Status: SHIPPED
Start: 2022-02-14 | End: 2022-06-10

## 2022-02-14 ASSESSMENT — FIBROSIS 4 INDEX: FIB4 SCORE: 1.12

## 2022-02-14 ASSESSMENT — PATIENT HEALTH QUESTIONNAIRE - PHQ9: CLINICAL INTERPRETATION OF PHQ2 SCORE: 0

## 2022-02-14 NOTE — PROGRESS NOTES
Chief Complaint: Follow up for positive anti-TPO antibody testing compatible with Hashimoto's but with normal TSH levels, history of hypercalcemia now resolved, history of osteopenia    HPI:     Lauren Jiang is a 71 y.o. female here for follow up of the above medical issues    I initially saw her as a referral for elevated TPO antibodies but with baseline normal TSH levels.  She has multiple somatic complaints which are not explained by her normal TSH levels.  This include fatigue, coldness, difficulty losing weight, dry skin and hair loss.   She has multiple thyroid nodules but no anterior neck compressive symptoms from her thyroid.   She denies a family history of thyroid cancer and denies radiation exposure to the head and neck.       Her other comorbid issues include estrogen receptor positive breast cancer treated with surgery and she is on aromatase inhibitor letrozole.      On follow-up she reports fatigue and feeling cold and she wants to try thyroid hormone to see if this will help    Her TSH is 1.2 with a free T4 of 1.2 on February 4, 2022  Her baseline TSH was 0.608 in January 2021 with elevated TPO antibodies of 115       Her last ultrasound on October 2021 showed spongiform nodules which are low risk for malignancy in the right lower lobe (2.4 cm ) , right mid lobe (1.1 cm) and left lower lobe (1.0 cm) for which I have recommended observation.          She has baseline osteopenia from her bone density on September 16, 2019 with the lowest T score of -1.5 for the left hip. FRAX scores were not significantly elevated at 10.2% and 1.5% respectively for 10-year risk of any major fracture and hip fracture.   Work-up for bone loss was negative for myeloma and vitamin D deficiency and hyperparathyroidism and Paget's disease.   She is not on bisphosphonates but she is taking vitamin D    Most recent vitamin D is adequate at 59 with a PTH of 46 and calcium of 10.2 on February 4, 2022    She is actually  overdue for a bone density.  When I tried ordering it last time, there was an issue with her insurance and the order did not go through.          Patient's medications, allergies, and social histories were reviewed and updated as appropriate.      ROS:     CONS:     No fever, no chills   EYES:     No diplopia, no blurry vision   CV:           No chest pain, no palpitations   PULM:     No SOB, no cough, no hemoptysis.   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  2019-04: Adjustment disorder with mixed anxiety and depressed mood  2018-04: Other constipation  2018-04: Vaginal burning  2017-08: Overweight (BMI 25.0-29.9)  2017-08: Atypical chest pain due to GERD  2017-08: CKD (chronic kidney disease) stage 3, GFR 30-59 ml/min ([facility])  2017-08: HTN (hypertension)  2016-08: Decreased GFR  2016-08: Chronic fatigue  2016-01: History of breast cancer  2016-01: Essential hypertension  2016-01: Glaucoma  2016-01: Mixed hyperlipidemia  2016-01: Gastroesophageal reflux disease without esophagitis  2016-01: Hot flashes  2016-01: Osteopenia  2016-01: Preventative health care      Past Surgical History:  Past Surgical History:   Procedure Laterality Date   • HYSTERECTOMY LAPAROSCOPY  51 y/o    cervix and bilat ovaries removed.   • KNEE ARTHROSCOPY  60 y/o   • KNEE ARTHROSCOPY Right    • MASTECTOMY  age 62    right mastectomy , 9 years ago   • TONSILLECTOMY     • TONSILLECTOMY AND ADENOIDECTOMY  5 y/o     "    Allergies:  Patient has no known allergies.     Social History:  Social History     Tobacco Use   • Smoking status: Former Smoker     Packs/day: 0.25     Years: 10.00     Pack years: 2.50     Types: Cigarettes     Quit date: 2000     Years since quittin.1   • Smokeless tobacco: Never Used   Vaping Use   • Vaping Use: Never used   Substance Use Topics   • Alcohol use: Yes     Comment: glass of wine    • Drug use: No        Family History:   family history includes Arthritis in her daughter and mother; Cancer in her maternal grandmother and sister; Diabetes in her maternal grandfather and maternal uncle; Heart Disease in her father; Hypertension in her mother; Lung Disease in her mother; No Known Problems in her daughter, paternal grandfather, and paternal grandmother; Stroke in her mother; Thyroid in her mother.      PHYSICAL EXAM:   Vital signs: /80 (BP Location: Left arm, Patient Position: Sitting, BP Cuff Size: Adult)   Pulse (!) 107   Ht 1.651 m (5' 5\")   Wt 80.7 kg (178 lb)   SpO2 94%   BMI 29.62 kg/m²   GENERAL: Well-developed, well-nourished in no apparent distress.   EYE:  No ocular asymmetry, PERRLA  HENT: Pink, moist mucous membranes.    NECK: No thyromegaly.   CARDIOVASCULAR:  No murmurs  LUNGS: Clear breath sounds  ABDOMEN: Soft, nontender   EXTREMITIES: No clubbing, cyanosis, or edema.   NEUROLOGICAL: No gross focal motor abnormalities   LYMPH: No cervical adenopathy palpated.   SKIN: No rashes, lesions.       Labs:  Lab Results   Component Value Date/Time    SODIUM 140 2022 10:06 AM    POTASSIUM 4.0 2022 10:06 AM    CHLORIDE 104 2022 10:06 AM    CO2 24 2022 10:06 AM    ANION 12.0 2022 10:06 AM    GLUCOSE 93 2022 10:06 AM    BUN 24 (H) 2022 10:06 AM    CREATININE 1.10 2022 10:06 AM    CALCIUM 10.2 2022 10:06 AM    ASTSGOT 22 2022 10:06 AM    ALTSGPT 26 2022 10:06 AM    TBILIRUBIN 0.6 2022 10:06 AM    ALBUMIN " 4.9 02/04/2022 10:06 AM    ALBUMIN 4.28 04/27/2021 01:38 PM    TOTPROTEIN 7.6 02/04/2022 10:06 AM    TOTPROTEIN 7.0 04/27/2021 01:38 PM    GLOBULIN 2.7 02/04/2022 10:06 AM    AGRATIO 1.8 02/04/2022 10:06 AM       Lab Results   Component Value Date/Time    SODIUM 140 04/27/2021 1338    POTASSIUM 3.9 04/27/2021 1338    CHLORIDE 105 04/27/2021 1338    CO2 24 04/27/2021 1338    GLUCOSE 145 (H) 04/27/2021 1338    BUN 23 (H) 04/27/2021 1338    CREATININE 1.14 04/27/2021 1338    CALCIUM 10.1 04/27/2021 1338    ANION 11.0 04/27/2021 1338       Lab Results   Component Value Date/Time    CHOLSTRLTOT 166 01/11/2021 1029    TRIGLYCERIDE 135 01/11/2021 1029    HDL 55 01/11/2021 1029    LDL 84 01/11/2021 1029       Lab Results   Component Value Date/Time    TSHULTRASEN 0.460 04/27/2021 1338     Lab Results   Component Value Date/Time    FREET4 1.12 04/27/2021 1338     No results found for: FREET3  No results found for: THYSTIMIG    Lab Results   Component Value Date/Time    MICROSOMALA 97.0 (H) 01/11/2021 1029         Imaging:      ASSESSMENT/PLAN:     1. Hashimoto's thyroiditis  Stable  TSH is normal with elevated TPO antibodies  I am going to give her a therapeutic trial of levothyroxine to see if her symptoms will improve  She will get labs in 3 months and we will talk  If her labs are stable we will repeat it again every 6 to 12 months    2. Non-toxic multinodular goiter  Stable she has low risk spongiform nodules with risk of malignancy of less than 3%  I am scheduling her for an ultrasound in the office in 6 months    3. Osteopenia, unspecified location  Stable  Stable without interval falls or fracture  I am scheduling her for a bone density since this is overdue  I will update her on the test results and make recommendations and provide antiresorptive therapy if this is necessary or indicated      Return in about 6 months (around 8/14/2022).      Thank you kindly for allowing me to participate in the thyroid care plan for  this patient.    Gilberto Polk MD, FACE, [facility]  08/24/21    CC:   Tre Britton M.D.

## 2022-04-24 ASSESSMENT — ENCOUNTER SYMPTOMS
WHEEZING: 0
HEMOPTYSIS: 0
CHEST TIGHTNESS: 0
SHORTNESS OF BREATH: 1
DYSPNEA AT REST: 1

## 2022-04-25 ENCOUNTER — OFFICE VISIT (OUTPATIENT)
Dept: SLEEP MEDICINE | Facility: MEDICAL CENTER | Age: 72
End: 2022-04-25
Payer: MEDICARE

## 2022-04-25 VITALS
WEIGHT: 178 LBS | DIASTOLIC BLOOD PRESSURE: 78 MMHG | RESPIRATION RATE: 16 BRPM | OXYGEN SATURATION: 96 % | HEIGHT: 65 IN | SYSTOLIC BLOOD PRESSURE: 112 MMHG | BODY MASS INDEX: 29.66 KG/M2 | HEART RATE: 107 BPM

## 2022-04-25 DIAGNOSIS — E66.3 OVERWEIGHT (BMI 25.0-29.9): ICD-10-CM

## 2022-04-25 DIAGNOSIS — I49.3 PVC'S (PREMATURE VENTRICULAR CONTRACTIONS): ICD-10-CM

## 2022-04-25 DIAGNOSIS — F51.04 CHRONIC INSOMNIA: ICD-10-CM

## 2022-04-25 DIAGNOSIS — R91.8 PULMONARY NODULES: ICD-10-CM

## 2022-04-25 DIAGNOSIS — G47.33 OSA (OBSTRUCTIVE SLEEP APNEA): ICD-10-CM

## 2022-04-25 DIAGNOSIS — R53.82 CHRONIC FATIGUE: ICD-10-CM

## 2022-04-25 DIAGNOSIS — Z85.3 HISTORY OF BREAST CANCER: ICD-10-CM

## 2022-04-25 DIAGNOSIS — E06.3 HASHIMOTO'S THYROIDITIS: ICD-10-CM

## 2022-04-25 PROCEDURE — 99214 OFFICE O/P EST MOD 30 MIN: CPT | Performed by: INTERNAL MEDICINE

## 2022-04-25 RX ORDER — OMEPRAZOLE 20 MG/1
20 CAPSULE, DELAYED RELEASE ORAL
Qty: 30 CAPSULE | Refills: 3 | Status: SHIPPED | OUTPATIENT
Start: 2022-04-25 | End: 2022-11-18

## 2022-04-25 ASSESSMENT — FIBROSIS 4 INDEX: FIB4 SCORE: 1.12

## 2022-04-25 NOTE — PATIENT INSTRUCTIONS
Thanks for coming to the office today.  Please bring all of your medication bottles to the next office visit, specially inhalers.  If requested, please obtain the prior records from your lung doctor.  If you have been prescribed inhalers, please use them as indicated and please rinse your mouth after using them each time.  GERD recommendations: keep head of the bed elevated at 30 degrees, avoid cafeinated drinks when possible even during the day, avoid eating anything 2 hours prior to going to bed    Call if any questions!                    Return To Clinic:  6 months. Please do not forget to come at least 20 minutes prior to your appointment.  It has been a pleasure seeing you today.    Fran Ty MD  Pulmonary/Critical Care

## 2022-04-25 NOTE — PROGRESS NOTES
"Pulmonary Clinic Office Note    Date of Visit: 4/25/2022 PCP: Tre Britton M.D.    Reason for visit: \"new patient\"    Chart reviewed prior to patient interview.    Lauren Jiang is a 71 y.o. year old female, with a PMHx of breast cancer s/p total mastectomy,  who presented to the Pulmonary Clinic for a new referral.      Background:  -ENT procedure for swelling in nasal tissues  -hx of hashimoto thyroiditis  -tx with anastrazolee for 8 years    Today:  She reports mouth breathing and had an ENT laser for a procedure done several months ago. She reports some improvement after the procedure but lasted only 3-6 months. Her symptoms are back  She reports that feels cannot take a deep breath.  She told me that has frequent PVCs as a baseline but had a neg stress test.  She reports that gets tired easily    Mild GERD symptoms, she was on PPI years ago, tums works for her now.  No history of respiratory failure requiring mechanical ventilation  No history of hemoptysis.  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   1  I get short of breath when hurrying on level ground or walking up a slight hill.   2  On level ground, I walk slower than people of the same " age because of breathlessness, or have to stop for breath when walking at my own pace.   3  I stop for breath after walking about 100 yards or after a few minutes on level ground.   4  I am too breathless to leave the house or I am breathless when dressing.     Influenza Vaccine: yes  COVID vaccine: yes, 2 vax and 1 booster  Pneumovax:  Yes   Hospitalizations:  No  ER visits:  No  Past Intubation:  No    Sleep History:  Sleep Hygiene: she reports that has many problems with insomina, at times sleeps 3 hr others 8 hrs, disrupted sleep pattern.  Snoring:  yes  Apnea:  No  AM headaches:  yes  Fatigue:  yes      ----------------------------------------------------------------------------------------------------------------------------------------------------------------------------------------------------------------------------------------------------------------  Past Medical History:   Diagnosis Date   • Breast cancer (HCC) 2013   • Cataract    • CKD (chronic kidney disease) stage 3, GFR 30-59 ml/min (HCC)     • Gallstones    • Glaucoma     right eye   • Hashimoto's thyroiditis 7/22/2020   • Hyperlipidemia    • Hypertension    • Malignant neoplasm of upper-outer quadrant of right breast in female, estrogen receptor positive (HCC) 2013    Complete mastectomy in 2013 with Dr. Garg.  Had Dr. Jay who recommended Femara for total of 10 yeas.   • Uncontrolled daytime somnolence 8/26/2020   • Unexplained night sweats 7/10/2020     No Known Allergies  Family History   Problem Relation Age of Onset   • Heart Disease Father         quadriple bypass    • Cancer Sister         breast with mets   • Hypertension Mother    • Lung Disease Mother         smoker   • Arthritis Mother         RA   • Stroke Mother    • Thyroid Mother    • Diabetes Maternal Uncle         type I   • Cancer Maternal Grandmother         Colon   • Diabetes Maternal Grandfather         type II   • Arthritis Daughter    • No Known Problems Daughter    • No  "Known Problems Paternal Grandmother    • No Known Problems Paternal Grandfather      Past Surgical History:   Procedure Laterality Date   • HYSTERECTOMY LAPAROSCOPY  51 y/o    cervix and bilat ovaries removed.   • KNEE ARTHROSCOPY  60 y/o   • KNEE ARTHROSCOPY Right    • MASTECTOMY  age 62    right mastectomy , 9 years ago   • TONSILLECTOMY     • TONSILLECTOMY AND ADENOIDECTOMY  5 y/o     Social History     Tobacco Use   • Smoking status: Former Smoker     Packs/day: 0.25     Years: 10.00     Pack years: 2.50     Types: Cigarettes     Quit date: 2000     Years since quittin.3   • Smokeless tobacco: Never Used   Vaping Use   • Vaping Use: Never used   Substance Use Topics   • Alcohol use: Yes     Comment: glass of wine    • Drug use: No         Review of Systems (Positive in Bold, otherwise negative)    Constitutional: fever, chills, night sweats, weightloss  HEENT: headaches, migraines, vision changes, blurred vision, dry eyes,  changes in hearing, rhinorrhea, sinus congestion, dysphagia  Hoarseness of voice, choking  CV: chest pain, GREGORY, orthopnea, edema, palpitations  Resp: episodes SOB, cough, hemoptysis, asthma, repeated infections, sputum production, wheezing  GI: changes in appetite, nausea, vomiting, diarrhea, constipation, pain, GERD  : Dysuria, hematuria, nocturia  Lymph: swollen glands  MSK: Muscle weakness, wasting, arthralgia, myalgia  Neuro/Psych: Sensory disturbances, seizures, syncope, anxiety, depression    Objective:  Vitals: /78   Pulse (!) 107   Resp 16   Ht 1.651 m (5' 5\")   Wt 80.7 kg (178 lb)   SpO2 96%   BMI 29.62 kg/m²     Wt Readings from Last 3 Encounters:   22 80.7 kg (178 lb)   22 82 kg (180 lb 12.8 oz)   22 80.7 kg (178 lb)     Gen: AAO x 3, appears of stated age, well-nourished and in NAD  Eyes: sclerae anicteric, conjunctivae appear normal, PEERLA, EOM in tact  ENT: EAC appears normal w/o erythema/exudate.  Overall oral hygiene is " good  Mallampati Score: 4  Neck: Supple w/o evidence of LAD, thyroid normal and size and w/o nodularity  CV: RRR w/o murmurs, rubs, gallops. Normal S1/S2. No peripheral edema orJVD.  Lungs: CTAB w/o wheezing, rales, rhonchi. Good air entry, normal chest excursion.  GI: Soft and non-tender, + BS x 4. No rebound or guarding.  Extremities: +2/4 bilateral pedal pulses, cool and dry, no edema.  MS: +5/5 bilateral UE/LE muscle strength testing, no obvious joint deformities, swelling noted, good overall muscle tone  Neuro: No focal neurological deficits    ----------------------------------------------------------------------------------------------------------------------------------------------------------------------------------------------------------------------------------------------------------------  Labs, Imaging & Scoring Systems:    Lab results since patient's previous encounter were reviewed with the patient.  Pertinent results discussed below or included within the note.    PFTs (Date: 12/30/21)-  INTERPRETATION:  1.  Spirometry is normal.  2.  There is no significant post-bronchodilator response.  3.  The flow volume loop is consistent with the spirometry data.  4.  Lung volumes are normal.  5.  Diffusion capacity is normal.  6.  There are no prior studies for comparison.    CT Chest: (Date: 11/24/21)-  1.  There is no acute pneumonia or evidence of interstitial lung disease to explain the patient's history of dyspnea.  2.  There are bilateral predominantly subpleural 3-5 mm parenchymal lung nodules. These are nonspecific and could be either metastatic or postinflammatory. They are too small to be evaluated with PET/CT and they are too small for percutaneous biopsy.   Continued follow-up is recommended per NCCN guidelines.  3.  Heterogeneous hepatic density likely hepatic steatosis with 2 nonspecific hypodense hepatic lesions. In light of the patient's history of breast cancer, further characterization with  "hepatic MRI is recommended.     Home sleep study  21  Mild sleep apnea - MONTSE 8.9; a home sleep study may underestimate the severity of sleep apnea as the total sleep time is unknown and monitoring time is used instead.  Persistent nocturnal desaturation - bar saturation 83% - less than 90% for 38.4% of the TIB  Significant snorin total snoring episodes/percentage of snoring 13.7%      ----------------------------------------------------------------------------------------------------------------------------------------------------------------------------------------------------------------------------------------------------------------      Impression:  1. Pulmonary nodules  2. MICHAEL  3. Insomnia disorder  4. PVCs      Discussion:    Lauren Jiang is a 71 y.o. with a hx of MICHAEL not on any treatment (diagnosed by home sleep study with mild MICHAEL but suspect could be worse) also has a chronic hx of insomnia that will need to be address. I have recommended a sleep medicine referral even prior to sleep in lab study given insomnia issues. Patient agrees.  On the other hand I suspect that her \"SOB episodes\" might be related to frequent PVCs and/or GERD symptoms. We discuss recommendations for each.    Plan:    1. GERD recommendations: keep head of the bed elevated at 30 degrees, avoid cafeinated drinks when possible even during the day, avoid eating anything 2 hours prior to going to bed  2. Regarding pulmonary nodules, will need to repeat CT chest in 2022, orders placed.  3. referral to nutritionist to help working with her in a diet  4. referral to sleep medicine for chronic insomina and MICHAEL, not on any treatment  5. Continue current inhaler regimen:  None  6. Recommend to re start/continue with PPI. Orders placed.  7. We have reviewed in the room the last CT chest and also the last PFTs.    * Patient Education                         - Educated the patient on his/her disease processes                      " "   - Answered all questions to the patients satisfaction.                         - Reminded the patient to bring all of his/her medication bottles to the next office visit.                           * Return To Clinic:  6 months or PRN      The patient voiced understanding of the above treatment approach and agreed with the direction of care. The patient was educated about their conditions and all questions were addressed during this encounter. I have also reminded the patient to bring all of their medications to the next office visit.  Lauren Jiang was instructed to call the office if any questions or concerns arise prior to their next appointment.      Fran Ty MD FACP  Pulmonary/Critical Care   4/25/2022 1:24 PM    This note reflects my clinical thought processes and is intended primarily for the exchange of information between healthcare providers.  It is not written primarily to communicate with the patient or family directly, which takes place in-person in clinic, by phone/virtual visits or the bedside.  This note might contain sensitive information, including substance use, mental health and consideration of sensitive, serious or other \"do-not-miss\" diagnoses, which is further discussed at the bedside.    "

## 2022-05-16 ENCOUNTER — OFFICE VISIT (OUTPATIENT)
Dept: SLEEP MEDICINE | Facility: MEDICAL CENTER | Age: 72
End: 2022-05-16
Payer: MEDICARE

## 2022-05-16 VITALS
SYSTOLIC BLOOD PRESSURE: 126 MMHG | HEART RATE: 79 BPM | DIASTOLIC BLOOD PRESSURE: 78 MMHG | OXYGEN SATURATION: 93 % | HEIGHT: 65 IN | BODY MASS INDEX: 30.32 KG/M2 | WEIGHT: 182 LBS

## 2022-05-16 DIAGNOSIS — R91.8 PULMONARY NODULES: ICD-10-CM

## 2022-05-16 DIAGNOSIS — G47.33 OSA (OBSTRUCTIVE SLEEP APNEA): ICD-10-CM

## 2022-05-16 DIAGNOSIS — E06.3 HASHIMOTO'S THYROIDITIS: ICD-10-CM

## 2022-05-16 DIAGNOSIS — R53.82 CHRONIC FATIGUE: ICD-10-CM

## 2022-05-16 DIAGNOSIS — I10 ESSENTIAL HYPERTENSION: ICD-10-CM

## 2022-05-16 DIAGNOSIS — F51.04 CHRONIC INSOMNIA: ICD-10-CM

## 2022-05-16 DIAGNOSIS — R09.81 CHRONIC NASAL CONGESTION: ICD-10-CM

## 2022-05-16 PROCEDURE — 99213 OFFICE O/P EST LOW 20 MIN: CPT | Performed by: NURSE PRACTITIONER

## 2022-05-16 ASSESSMENT — FIBROSIS 4 INDEX: FIB4 SCORE: 1.12

## 2022-05-16 NOTE — PROGRESS NOTES
Chief Complaint   Patient presents with   • Apnea     MICHAEL-Last seen 01/26/2021       HPI:      Mrs. Jiang is a 70 y/o female patient who is in today for MICHAEL f/u, overdue. She is a retired RN. PMH includes hypertension, glaucoma, osteopenia, overweight, right breast cancer s/p total mastectomy, Hashimoto's thyroiditis, CKD stage III, mixed hyperlipidemia, former smoker, 0.25 packs a day for 10 years quit in 1/1/2000, T&A.     Patient was seen in pulmonary clinic by Dr. Ty on 4/25/22 for pulmonary nodules. Please refer to his note for further detail.    Patient is interested in repeating sleep study and starting CPAP therapy if indicated.  She goes to bed between 11 pm -12 am and wakes up between 7-8 am. She reports chronic headache and snoring. She has been diagnosed with Hashimoto's thyroiditis and feels chronically fatigued. She was placed on levothyroxine but this caused leg and ankle swelling. She therefore discontinued the levothyroxine.  She continues to follow-up with endocrinology.  She continues to follow-up with PCP for blood pressure management.  She tries to stay active by walking but does have limitations due to knee pain.  She is following up with a specialist for knee injections.  Patient also followed up with Dr. Wells ENT and underwent a laser procedure for her sinuses but per patient she possibly might need to repeat this procedure.  She has another follow-up coming up soon.  She has struggled with mouth breathing which has then also affected her dentition.      Sleep/Card/Pulm Study History:   PFT from 12/30/21 indicated spirometry is normal. There is no significant post-bronchodilator response.  The flow volume loop is consistent with the spirometry data. Lung volumes are normal. Diffusion capacity is normal. There are no prior studies for comparison. FVC is 2.80, which is 94% of predicted with no significant change post bronchodilator. FEV1 is 2.22, which is 97% of predicted with no  significant change post bronchodilator. FEV1/FVC ratio is 79.17. TLC is 4.77, which is 91% of predicted. RV is 1.97, which is 87% of predicted. DLCO is 18.14, which is 90% of predicted. DL/VA is 3.84, which is 99% of predicted.    CT Chest from 21 indicated there is no acute pneumonia or evidence of interstitial lung disease to explain the patient's history of dyspnea. There are bilateral predominantly subpleural 3-5 mm parenchymal lung nodules. These are nonspecific and could be either metastatic or postinflammatory. They are too small to be evaluated with PET/CT and they are too small for percutaneous biopsy.   Continued follow-up is recommended per NCCN guidelines. Heterogeneous hepatic density likely hepatic steatosis with 2 nonspecific hypodense hepatic lesions. In light of the patient's history of breast cancer, further characterization with hepatic MRI is recommended.    Echo from 21 indicated normal left ventricular chamber size. Left ventricular ejection fraction is visually estimated to be 55%. Indeterminate diastolic function. Mild mitral regurgitation. Estimated right ventricular systolic pressure is 40 mmHg. Normal inferior vena cava size and inspiratory collapse.    HSS from 21 indicated mild sleep apnea - MONTSE 8.9; a home sleep study may underestimate the severity of sleep apnea as the total sleep time is unknown and monitoring time is used instead. Persistent nocturnal desaturation - bar saturation 83% - less than 90% for 38.4% of the TIB. Significant snorin total snoring episodes/percentage of snoring 13.7%.     ROS:    Constitutional: Denies fevers, Denies weight changes  Eyes: Denies changes in vision, no eye pain  Ears/Nose/Throat/Mouth: Denies nasal congestion or sore throat   Cardiovascular: Denies chest pain or palpitations   Respiratory: Denies shortness of breath , Denies cough  Gastrointestinal/Hepatic: Denies abdominal pain, nausea, vomiting,   Skin/Breast: Denies  rash,   Neurological: Denies headache, confusion,   Psychiatric: denies mood disorder   Sleep: denies snoring       Past Medical History:   Diagnosis Date   • Breast cancer (HCC) 2013   • Cataract    • CKD (chronic kidney disease) stage 3, GFR 30-59 ml/min (HCC)     • Gallstones    • Glaucoma     right eye   • Hashimoto's thyroiditis 7/22/2020   • Hyperlipidemia    • Hypertension    • Malignant neoplasm of upper-outer quadrant of right breast in female, estrogen receptor positive (HCC) 2013    Complete mastectomy in 2013 with Dr. Garg.  Had Dr. Jay who recommended Femara for total of 10 yeas.   • Uncontrolled daytime somnolence 8/26/2020   • Unexplained night sweats 7/10/2020       Past Surgical History:   Procedure Laterality Date   • HYSTERECTOMY LAPAROSCOPY  53 y/o    cervix and bilat ovaries removed.   • KNEE ARTHROSCOPY  58 y/o   • KNEE ARTHROSCOPY Right    • MASTECTOMY  age 62    right mastectomy , 9 years ago   • TONSILLECTOMY     • TONSILLECTOMY AND ADENOIDECTOMY  7 y/o       Family History   Problem Relation Age of Onset   • Heart Disease Father         quadriple bypass    • Cancer Sister         breast with mets   • Hypertension Mother    • Lung Disease Mother         smoker   • Arthritis Mother         RA   • Stroke Mother    • Thyroid Mother    • Diabetes Maternal Uncle         type I   • Cancer Maternal Grandmother         Colon   • Diabetes Maternal Grandfather         type II   • Arthritis Daughter    • No Known Problems Daughter    • No Known Problems Paternal Grandmother    • No Known Problems Paternal Grandfather        Social History     Socioeconomic History   • Marital status:      Spouse name: Not on file   • Number of children: Not on file   • Years of education: Not on file   • Highest education level: Associate degree: occupational, technical, or vocational program   Occupational History     Comment: Retired LPN   Tobacco Use   • Smoking status: Former Smoker     Packs/day: 0.25      Years: 10.00     Pack years: 2.50     Types: Cigarettes     Quit date: 2000     Years since quittin.3   • Smokeless tobacco: Never Used   Vaping Use   • Vaping Use: Never used   Substance and Sexual Activity   • Alcohol use: Yes     Comment: glass of wine    • Drug use: No   • Sexual activity: Yes     Partners: 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: ; retired LPN   Other Topics Concern   • Not on file   Social History Narrative   • Not on file     Social Determinants of Health     Financial Resource Strain: Not on file   Food Insecurity: Not on file   Transportation Needs: Not on file   Physical Activity: Not on file   Stress: Not on file   Social Connections: Not on file   Intimate Partner Violence: Not on file   Housing Stability: Not on file       Allergies as of 2022   • (No Known Allergies)        Vitals:  Vitals:    22 1245   BP: 126/78   Pulse: 79   SpO2: 93%       Current medications as of today   Current Outpatient Medications   Medication Sig Dispense Refill   • omeprazole (PRILOSEC) 20 MG delayed-release capsule Take 1 Capsule by mouth every morning before breakfast. 30 minutes before meal. 30 Capsule 3   • levothyroxine (SYNTHROID) 25 MCG Tab Take 1 Tablet by mouth every morning on an empty stomach. 90 Tablet 1   • metoprolol tartrate (LOPRESSOR) 25 MG Tab Take 0.5 Tablets by mouth 2 times a day. 100 tablet 3   • pravastatin (PRAVACHOL) 20 MG Tab TAKE ONE TABLET BY MOUTH AT BEDTIME 100 tablet 3   • amLODIPine (NORVASC) 5 MG Tab Take 1 tablet by mouth every day. 90 tablet 3   • vitamin D (CHOLECALCIFEROL) 1000 Unit (25 mcg) Tab Take 1,000 Units by mouth every day.     • calcium carbonate (OS-OMERO 500) 1250 MG Tab Take 1,250 mg by mouth 2 times a day, with meals.       No current facility-administered medications for this visit.         Physical Exam: Limited by COVID-19 precautions.  Appearance: Well developed, well nourished, no acute distress  Eyes: PERRL, EOM intact, sclera white, conjunctiva  moist  Ears: no lesions or deformities  Hearing: grossly intact  Nose: no lesions or deformities  Respiratory effort: no intercostal retractions or use of accessory muscles  Extremities: no cyanosis or edema  Abdomen: soft   Gait and Station: normal  Digits and nails: no clubbing, cyanosis, petechiae or nodes.  Cranial nerves: grossly intact  Skin: no visible rashes, lesions or ulcers noted  Orientation: Oriented to time, person and place  Mood and affect: mood and affect appropriate, normal interaction with examiner  Judgement: Intact    Assessment:  1. MICHAEL (obstructive sleep apnea)  Polysomnography, 4 or More   2. Chronic insomnia     3. Pulmonary nodules     4. Essential hypertension     5. Chronic nasal congestion     6. BMI 30.0-30.9,adult     7. Hashimoto's thyroiditis     8. Chronic fatigue           Plan  Discussed the cardiovascular and neuropsychiatric risks of untreated MICHAEL; including but not limited to: HTN, DM, MI, ASCVD, CVA, CHF, traffic accidents.     1.  Patient will need to repeat testing since her last sleep study was completed over a year ago but therapy was not initiated.  Order updated PSG study to reassess MICHAEL. Patient will f/u for study results. If indicated will initiate CPAP vs BiPAP and supplemental O2 based on study results.     2. Sleep hygiene discussed. Recommend keeping a set sleep/wake schedule. Logging enough hours of sleep. Limiting/Avoiding naps. No caffeine after noon and no heavy meals in the evening.   Chronic insomnia: continue to monitor.   3. Continue to f/u with pulmonary clinic. Patient to repeat Chest CT in November 2022.   4. Continue to f/u with PCP for BP management.  5. Continue to f/u with Dr. Wells, ENT.   6. Encouraged a healthy diet and exercise to help with weight loss and management. Will call to schedule apt with a nutritionist.   If your BMI is 25-29.9 you are overweight. If your BMI is 30 or greater you are obese. To lose weight eat less, move more, or both.  Any diet that reduces caloric intake can help with weight loss. Extra weight may reduce your lifespan. Avoid dramatic unsustainable dietary changes that result in the yo-yo effect (down then back up.)  Usually small modifications in diet and exercise are easier to stick with.  7-8. Continue to f/u with Dr. Gimenez, Endocrinology.   9. Follow up with appropriate healthcare providers for all other medical problems.  10. F/u in 6 weeks for sleep study results, MICHAEL.        ALEXANDRE May.P.R.N.      This dictation was created using voice recognition software. The accuracy of the dictation is limited to the abilities of the software. I expect there may be some errors of grammar and possibly content.  '

## 2022-06-10 ENCOUNTER — OFFICE VISIT (OUTPATIENT)
Dept: URGENT CARE | Facility: CLINIC | Age: 72
End: 2022-06-10
Payer: MEDICARE

## 2022-06-10 VITALS
DIASTOLIC BLOOD PRESSURE: 74 MMHG | HEART RATE: 94 BPM | BODY MASS INDEX: 29.99 KG/M2 | WEIGHT: 180 LBS | OXYGEN SATURATION: 94 % | SYSTOLIC BLOOD PRESSURE: 112 MMHG | RESPIRATION RATE: 14 BRPM | HEIGHT: 65 IN | TEMPERATURE: 98.6 F

## 2022-06-10 DIAGNOSIS — U07.1 COVID-19: ICD-10-CM

## 2022-06-10 LAB
EXTERNAL QUALITY CONTROL: ABNORMAL
FLUAV+FLUBV AG SPEC QL IA: NEGATIVE
INT CON NEG: NORMAL
INT CON POS: NORMAL
SARS-COV+SARS-COV-2 AG RESP QL IA.RAPID: POSITIVE

## 2022-06-10 PROCEDURE — 87804 INFLUENZA ASSAY W/OPTIC: CPT | Performed by: NURSE PRACTITIONER

## 2022-06-10 PROCEDURE — 87426 SARSCOV CORONAVIRUS AG IA: CPT | Performed by: NURSE PRACTITIONER

## 2022-06-10 PROCEDURE — 99213 OFFICE O/P EST LOW 20 MIN: CPT | Mod: CS | Performed by: NURSE PRACTITIONER

## 2022-06-10 ASSESSMENT — ENCOUNTER SYMPTOMS
COUGH: 1
NAUSEA: 0
DIARRHEA: 0
WHEEZING: 1
SORE THROAT: 1
SHORTNESS OF BREATH: 1
VOMITING: 0
HEADACHES: 1
SINUS PAIN: 1
SPUTUM PRODUCTION: 1
SINUS PRESSURE: 1

## 2022-06-10 ASSESSMENT — FIBROSIS 4 INDEX: FIB4 SCORE: 1.12

## 2022-06-10 NOTE — PATIENT INSTRUCTIONS
Symptomatic care.  -Oral hydration and rest.   -Cough control: nonpharmacologic options for cough relief such as throat lozenges, hot tea, honey.  -Over the counter expectorant as directed; Guaifenesin (Mucinex).  -Tylenol for pain and fever as directed.   -Nasal spray and allergy medications as directed (Zyrtec or Loratadine).  -You may try saline irrigation or neti pot.   -Warm compress to sinuses.      Seek emergency medical care immediately for: Trouble breathing, persistent pain or pressure in the chest, confusion, inability to wake or stay awake, bluish lips or face, persistent tachycardia (fast heart rate), prolonged dizziness, persistent high grade fevers, stiff neck . Follow up for prolonged cough, persistent wheezing, leg swelling, or any other concerns. Follow up with your Primary Care Provider.

## 2022-06-10 NOTE — PROGRESS NOTES
Subjective:     Lauren Jiang is a 71 y.o. female who presents for Sinusitis (2x days, Productive cough, headache, pt states feels like a band is wrapped around head )      Symptoms x 2 days. Post nasal drip. Reports productive green sputum. Pressure behind eyes.     Sinusitis  This is a new problem. The current episode started yesterday. The problem has been gradually worsening since onset. There has been no fever. Her pain is at a severity of 7/10. The pain is moderate. Associated symptoms include congestion, coughing, ear pain, headaches, shortness of breath, sinus pressure and a sore throat. Past treatments include acetaminophen.       Past Medical History:   Diagnosis Date   • Breast cancer ([facility]) 2013   • Cataract    • CKD (chronic kidney disease) stage 3, GFR 30-59 ml/min ([facility])     • Gallstones    • Glaucoma     right eye   • Hashimoto's thyroiditis 7/22/2020   • Hyperlipidemia    • Hypertension    • Malignant neoplasm of upper-outer quadrant of right breast in female, estrogen receptor positive ([facility]) 2013    Complete mastectomy in 2013 with Dr. Garg.  Had Dr. Jay who recommended Femara for total of 10 yeas.   • Uncontrolled daytime somnolence 8/26/2020   • Unexplained night sweats 7/10/2020       Past Surgical History:   Procedure Laterality Date   • HYSTERECTOMY LAPAROSCOPY  51 y/o    cervix and bilat ovaries removed.   • KNEE ARTHROSCOPY  60 y/o   • KNEE ARTHROSCOPY Right    • MASTECTOMY  age 62    right mastectomy , 9 years ago   • TONSILLECTOMY     • TONSILLECTOMY AND ADENOIDECTOMY  5 y/o       Social History     Socioeconomic History   • Marital status:      Spouse name: Not on file   • Number of children: Not on file   • Years of education: Not on file   • Highest education level: Associate degree: occupational, technical, or vocational program   Occupational History     Comment: Retired LPN   Tobacco Use   • Smoking status: Former Smoker     Packs/day: 0.25     Years: 10.00     Pack  years: 2.50     Types: Cigarettes     Quit date: 2000     Years since quittin.4   • Smokeless tobacco: Never Used   Vaping Use   • Vaping Use: Never used   Substance and Sexual Activity   • Alcohol use: Yes     Comment: glass of wine    • Drug use: No   • Sexual activity: Yes     Partners: 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: ; retired LPN   Other Topics Concern   • Not on file   Social History Narrative   • Not on file     Social Determinants of Health     Financial Resource Strain: Not on file   Food Insecurity: Not on file   Transportation Needs: Not on file   Physical Activity: Not on file   Stress: Not on file   Social Connections: Not on file   Intimate Partner Violence: Not on file   Housing Stability: Not on file        Family History   Problem Relation Age of Onset   • Heart Disease Father         quadriple bypass    • Cancer Sister         breast with mets   • Hypertension Mother    • Lung Disease Mother         smoker   • Arthritis Mother         RA   • Stroke Mother    • Thyroid Mother    • Diabetes Maternal Uncle         type I   • Cancer Maternal Grandmother         Colon   • Diabetes Maternal Grandfather         type II   • Arthritis Daughter    • No Known Problems Daughter    • No Known Problems Paternal Grandmother    • No Known Problems Paternal Grandfather         No Known Allergies    Review of Systems   Constitutional: Positive for malaise/fatigue.   HENT: Positive for congestion, ear pain, sinus pressure, sinus pain and sore throat.    Respiratory: Positive for cough, sputum production, shortness of breath and wheezing.    Gastrointestinal: Negative for diarrhea, nausea and vomiting.   Neurological: Positive for headaches.   Endo/Heme/Allergies: Positive for environmental allergies.   All other systems reviewed and are negative.       Objective:   /74 (BP Location: Left arm, Patient Position: Sitting, BP Cuff Size: Adult)   Pulse 94   Temp 37 °C (98.6 °F) (Temporal)   Resp 14   Ht 1.651  "m (5' 5\")   Wt 81.6 kg (180 lb)   SpO2 94%   BMI 29.95 kg/m²     Physical Exam  Vitals reviewed.   Constitutional:       General: She is not in acute distress.     Appearance: She is well-developed. She is ill-appearing. She is not toxic-appearing.   HENT:      Head: Normocephalic and atraumatic.      Right Ear: Ear canal and external ear normal. No drainage, swelling or tenderness. A middle ear effusion is present. Tympanic membrane is not perforated or erythematous.      Left Ear: Ear canal and external ear normal. No drainage, swelling or tenderness. A middle ear effusion is present. Tympanic membrane is not perforated or erythematous.      Ears:      Comments: Clear effusions.      Nose: Rhinorrhea present.      Right Sinus: Frontal sinus tenderness present. No maxillary sinus tenderness.      Left Sinus: Frontal sinus tenderness present. No maxillary sinus tenderness.      Comments: Clear drainage.      Mouth/Throat:      Lips: Pink.      Pharynx: Posterior oropharyngeal erythema present.      Tonsils: No tonsillar exudate.   Eyes:      Conjunctiva/sclera: Conjunctivae normal.   Cardiovascular:      Rate and Rhythm: Normal rate.   Pulmonary:      Effort: Pulmonary effort is normal. No respiratory distress.      Breath sounds: Normal breath sounds. No stridor. No wheezing, rhonchi or rales.      Comments: Cough noted.   Musculoskeletal:         General: Normal range of motion.      Cervical back: Normal range of motion and neck supple.   Skin:     General: Skin is warm and dry.      Findings: No rash.   Neurological:      General: No focal deficit present.      Mental Status: She is alert and oriented to person, place, and time.      GCS: GCS eye subscore is 4. GCS verbal subscore is 5. GCS motor subscore is 6.   Psychiatric:         Mood and Affect: Mood normal.         Speech: Speech normal.         Behavior: Behavior normal.         Thought Content: Thought content normal.         Judgment: Judgment " normal.         Assessment/Plan:   1. COVID-19  - POCT Influenza A/B  - POCT SARS-COV Antigen LONI (Symptomatic only)  - Nirmatrelvir & Ritonavir 20 x 150 MG & 10 x 100MG Tablet Therapy Pack; Take 150 mg nirmatrelvir (one 150 mg tablet) with 100 mg ritonavir (one 100 mg tablet) by mouth, with both tablets taken together twice daily for 5 days.  Dispense: 20 Each; Refill: 0    Results for orders placed or performed in visit on 06/10/22   POCT Influenza A/B   Result Value Ref Range    Rapid Influenza A-B NEGATIVE     Internal Control Positive Valid     Internal Control Negative Valid    POCT SARS-COV Antigen LONI (Symptomatic only)   Result Value Ref Range    Internal  Valid     SARS-COV ANTIGEN LONI Positive (A) Negative, Indeterminate, None Detected, Valid, Invalid, Pass   Symptomatic care.  -Oral hydration and rest.   -Cough control: nonpharmacologic options for cough relief such as throat lozenges, hot tea, honey.  -Over the counter expectorant as directed; Guaifenesin (Mucinex).  -Tylenol for pain and fever as directed.   -Nasal spray and allergy medications as directed (Zyrtec or Loratadine).  -You may try saline irrigation or neti pot.   -Warm compress to sinuses.      Seek emergency medical care immediately for: Trouble breathing, persistent pain or pressure in the chest, confusion, inability to wake or stay awake, bluish lips or face, persistent tachycardia (fast heart rate), prolonged dizziness, persistent high grade fevers, stiff neck . Follow up for prolonged cough, persistent wheezing, leg swelling, or any other concerns. Follow up with your Primary Care Provider.     -Discussed viral etiology. COVID S&S, and self isolation guidelines. S&S of PNA with follow up. Stable Vitals. Paxlovid dose adjusted for renal insufficiency. Discussed possible medicationinteraction with Amlodipine, monitor blood pressure.  Can decrease amlodipine dose in half if such occurs. GFR 49 in feburary.     Differential  diagnosis, natural history, supportive care, and indications for immediate follow-up discussed.

## 2022-06-12 ENCOUNTER — APPOINTMENT (OUTPATIENT)
Dept: SLEEP MEDICINE | Facility: MEDICAL CENTER | Age: 72
End: 2022-06-12
Attending: NURSE PRACTITIONER
Payer: MEDICARE

## 2022-06-23 ENCOUNTER — TELEPHONE (OUTPATIENT)
Dept: MEDICAL GROUP | Facility: LAB | Age: 72
End: 2022-06-23
Payer: MEDICARE

## 2022-06-23 NOTE — TELEPHONE ENCOUNTER
ESTABLISHED PATIENT PRE-VISIT PLANNING     Patient was NOT contacted to complete PVP.     Note: Patient will not be contacted if there is no indication to call.     1.  Reviewed notes from the last few office visits within the medical group: Yes    2.  If any orders were placed at last visit or intended to be done for this visit (i.e. 6 mos follow-up), do we have Results/Consult Notes?         •  Labs - Labs were not ordered at last office visit.  Note: If patient appointment is for lab review and patient did not complete labs, check with provider if OK to reschedule patient until labs completed.       •  Imaging - Imaging was not ordered at last office visit.       •  Referrals - Referral ordered, patient was seen and consult notes are in chart. Care Teams updated  YES.    3. Is this appointment scheduled as a Hospital Follow-Up? No    4.  Immunizations were updated in Epic using Reconcile Outside Information activity? Yes    5.  Patient is due for the following Health Maintenance Topics:   Health Maintenance Due   Topic Date Due   • IMM ZOSTER VACCINES (2 of 3) 12/25/2017   • COVID-19 Vaccine (3 - Booster for Moderna series) 08/30/2021   • MAMMOGRAM  06/14/2022         6.  AHA (Pulse8) form printed for Provider? No, already completed

## 2022-07-01 ENCOUNTER — HOSPITAL ENCOUNTER (OUTPATIENT)
Dept: LAB | Facility: MEDICAL CENTER | Age: 72
End: 2022-07-01
Attending: FAMILY MEDICINE
Payer: MEDICARE

## 2022-07-01 ENCOUNTER — OFFICE VISIT (OUTPATIENT)
Dept: MEDICAL GROUP | Facility: LAB | Age: 72
End: 2022-07-01
Payer: MEDICARE

## 2022-07-01 ENCOUNTER — TELEPHONE (OUTPATIENT)
Dept: HEALTH INFORMATION MANAGEMENT | Facility: OTHER | Age: 72
End: 2022-07-01

## 2022-07-01 VITALS
RESPIRATION RATE: 16 BRPM | DIASTOLIC BLOOD PRESSURE: 78 MMHG | TEMPERATURE: 97 F | BODY MASS INDEX: 29.32 KG/M2 | HEIGHT: 65 IN | SYSTOLIC BLOOD PRESSURE: 130 MMHG | WEIGHT: 176 LBS | OXYGEN SATURATION: 94 % | HEART RATE: 74 BPM

## 2022-07-01 DIAGNOSIS — U07.1 COVID-19: ICD-10-CM

## 2022-07-01 DIAGNOSIS — E06.3 HASHIMOTO'S THYROIDITIS: ICD-10-CM

## 2022-07-01 DIAGNOSIS — N18.31 STAGE 3A CHRONIC KIDNEY DISEASE: ICD-10-CM

## 2022-07-01 DIAGNOSIS — I10 ESSENTIAL HYPERTENSION: ICD-10-CM

## 2022-07-01 LAB
ALBUMIN SERPL BCP-MCNC: 4.7 G/DL (ref 3.2–4.9)
ALBUMIN/GLOB SERPL: 1.8 G/DL
ALP SERPL-CCNC: 73 U/L (ref 30–99)
ALT SERPL-CCNC: 29 U/L (ref 2–50)
ANION GAP SERPL CALC-SCNC: 12 MMOL/L (ref 7–16)
AST SERPL-CCNC: 23 U/L (ref 12–45)
BILIRUB SERPL-MCNC: 0.4 MG/DL (ref 0.1–1.5)
BUN SERPL-MCNC: 15 MG/DL (ref 8–22)
CALCIUM SERPL-MCNC: 10 MG/DL (ref 8.5–10.5)
CHLORIDE SERPL-SCNC: 103 MMOL/L (ref 96–112)
CO2 SERPL-SCNC: 23 MMOL/L (ref 20–33)
CREAT SERPL-MCNC: 1.15 MG/DL (ref 0.5–1.4)
GFR SERPLBLD CREATININE-BSD FMLA CKD-EPI: 51 ML/MIN/1.73 M 2
GLOBULIN SER CALC-MCNC: 2.6 G/DL (ref 1.9–3.5)
GLUCOSE SERPL-MCNC: 76 MG/DL (ref 65–99)
POTASSIUM SERPL-SCNC: 4.2 MMOL/L (ref 3.6–5.5)
PROT SERPL-MCNC: 7.3 G/DL (ref 6–8.2)
SODIUM SERPL-SCNC: 138 MMOL/L (ref 135–145)

## 2022-07-01 PROCEDURE — 36415 COLL VENOUS BLD VENIPUNCTURE: CPT

## 2022-07-01 PROCEDURE — 99214 OFFICE O/P EST MOD 30 MIN: CPT | Mod: CS | Performed by: FAMILY MEDICINE

## 2022-07-01 PROCEDURE — 80053 COMPREHEN METABOLIC PANEL: CPT

## 2022-07-01 ASSESSMENT — FIBROSIS 4 INDEX: FIB4 SCORE: 1.13

## 2022-07-01 NOTE — PROGRESS NOTES
Chief Complaint:   Chief Complaint   Patient presents with   • Follow-Up     6 mo FV        HPI: Established patient  Lauren Jiang is a 72 y.o. female who presents for follow-up, discussed the following today:    1. Stage 3a chronic kidney disease   Stable chronic asymptomatic, recently had COVID infection.  Has been trying to hydrate well.  Discussed with the patient repeat kidney function test.  Also discussed monitor blood pressure and and to consider ARB to improve kidney function and protection and to help control blood pressure.    2. COVID-19  Status post COVID infection, recovering well no concerns at this time.  Just residual cough.  Patient used the packs a little bit through urgent care    3. Essential hypertension  Well controlled, 130/78 today, takes medications as directed on amlodipine and metoprolol    4. Hashimoto's thyroiditis  Patient said she was started by endocrinology Dr. Gimenez on low-dose Synthroid but she felt some side effects and stopped the medication, she has an appointment to follow-up with him to discuss further      Has an appointment to follow-up with sleep center for sleep studies as part of the work-up of her chronic fatigue.    Past medical history, family history, social history and medications reviewed and updated in the record.  Today  Current medications, problem list and allergies reviewed in EPIC today  Health maintenance topics are reviewed and updated.    Patient Active Problem List    Diagnosis Date Noted   • Health care maintenance 05/20/2021   • Hypercalcemia 04/27/2021   • Non-toxic multinodular goiter 04/27/2021   • Uncontrolled daytime somnolence 08/26/2020   • Hashimoto's thyroiditis 07/22/2020   • Malignant neoplasm of upper-outer quadrant of right breast in female, estrogen receptor positive ([facility]) 07/10/2020   • Unexplained night sweats 07/10/2020   • Overweight (BMI 25.0-29.9) 08/23/2017   • CKD (chronic kidney disease) stage 3, GFR 30-59 ml/min ([facility])  2017   • Chronic fatigue 2016   • History of breast cancer 2016   • Essential hypertension 2016   • Glaucoma 2016   • Mixed hyperlipidemia 2016   • Osteopenia 2016     Family History   Problem Relation Age of Onset   • Heart Disease Father         quadriple bypass    • Cancer Sister         breast with mets   • Hypertension Mother    • Lung Disease Mother         smoker   • Arthritis Mother         RA   • Stroke Mother    • Thyroid Mother    • Diabetes Maternal Uncle         type I   • Cancer Maternal Grandmother         Colon   • Diabetes Maternal Grandfather         type II   • Arthritis Daughter    • No Known Problems Daughter    • No Known Problems Paternal Grandmother    • No Known Problems Paternal Grandfather      Social History     Socioeconomic History   • Marital status:      Spouse name: Not on file   • Number of children: Not on file   • Years of education: Not on file   • Highest education level: Associate degree: occupational, technical, or vocational program   Occupational History     Comment: Retired LPN   Tobacco Use   • Smoking status: Former Smoker     Packs/day: 0.25     Years: 10.00     Pack years: 2.50     Types: Cigarettes     Quit date: 2000     Years since quittin.5   • Smokeless tobacco: Never Used   Vaping Use   • Vaping Use: Never used   Substance and Sexual Activity   • Alcohol use: Yes     Comment: glass of wine    • Drug use: No   • Sexual activity: Yes     Partners: 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: ; retired LPN   Other Topics Concern   • Not on file   Social History Narrative   • Not on file     Social Determinants of Health     Financial Resource Strain: Not on file   Food Insecurity: Not on file   Transportation Needs: Not on file   Physical Activity: Not on file   Stress: Not on file   Social Connections: Not on file   Intimate Partner Violence: Not on file   Housing Stability: Not on file       Current Outpatient Medications  "  Medication Sig Dispense Refill   • amLODIPine (NORVASC) 5 MG Tab TAKE ONE TABLET BY MOUTH ONE TIME DAILY 90 Tablet 3   • omeprazole (PRILOSEC) 20 MG delayed-release capsule Take 1 Capsule by mouth every morning before breakfast. 30 minutes before meal. 30 Capsule 3   • metoprolol tartrate (LOPRESSOR) 25 MG Tab Take 0.5 Tablets by mouth 2 times a day. 100 tablet 3   • pravastatin (PRAVACHOL) 20 MG Tab TAKE ONE TABLET BY MOUTH AT BEDTIME 100 tablet 3   • vitamin D (CHOLECALCIFEROL) 1000 Unit (25 mcg) Tab Take 1,000 Units by mouth every day.     • calcium carbonate (OS-OMERO 500) 1250 MG Tab Take 1,250 mg by mouth 2 times a day, with meals.       No current facility-administered medications for this visit.         Review Of Systems  As documented in HPI above  PHYSICAL EXAMINATION:    /78 (BP Location: Left arm, Patient Position: Sitting, BP Cuff Size: Adult)   Pulse 74   Temp 36.1 °C (97 °F) (Temporal)   Resp 16   Ht 1.651 m (5' 5\")   Wt 79.8 kg (176 lb)   SpO2 94%   BMI 29.29 kg/m²   Gen.: Well-developed, well-nourished, no apparent distress, pleasant and cooperative with the examination  HEENT: Normocephalic/atraumatic,     Neck: No JVD or bruits, no adenopathy  Cor: Regular rate and rhythm without murmur gallop or rub  Lungs: Clear to auscultation with equal breath sounds bilaterally. No wheezes,, noted mild rhonchi at the base of the lung.  Abdomen: Soft nontender without hepatosplenomegaly or masses appreciated, normoactive bowel sounds  Extremities: No cyanosis, clubbing or edema          ASSESSMENT/Plan:  1. Stage 3a chronic kidney disease (HCC)   chronic stable, recheck kidney function test, advised to avoid NSAIDs and to continue hydration and push fluids.  Will consider ARB  Comp Metabolic Panel   2. COVID-19   recovering well no concerns, continue pushing fluids and hydration and supportive care.  No concerns   3. Essential hypertension   chronic, well controlled continue current regimen, will " consider starting patient on ARB if she agrees.   4. Hashimoto's thyroiditis   chronic stable follow-up with endocrinology to further discuss medication side effects, patient is not on thyroid medication at this time because she developed unfavorable symptoms as per history and she decided to stop it.     Please note that this dictation was created using voice recognition software. I have made every reasonable attempt to correct obvious errors but there may be errors of grammar and content that I may have overlooked prior to finalization of this note.

## 2022-07-07 ENCOUNTER — HOSPITAL ENCOUNTER (OUTPATIENT)
Dept: RADIOLOGY | Facility: MEDICAL CENTER | Age: 72
End: 2022-07-07
Attending: INTERNAL MEDICINE
Payer: MEDICARE

## 2022-07-07 DIAGNOSIS — C50.911 MALIGNANT NEOPLASM OF RIGHT FEMALE BREAST, UNSPECIFIED ESTROGEN RECEPTOR STATUS, UNSPECIFIED SITE OF BREAST (HCC): ICD-10-CM

## 2022-07-07 PROCEDURE — 76641 ULTRASOUND BREAST COMPLETE: CPT

## 2022-07-07 PROCEDURE — 77063 BREAST TOMOSYNTHESIS BI: CPT | Mod: 52

## 2022-08-22 ENCOUNTER — PROCEDURE VISIT (OUTPATIENT)
Dept: ENDOCRINOLOGY | Facility: MEDICAL CENTER | Age: 72
End: 2022-08-22
Attending: INTERNAL MEDICINE
Payer: MEDICARE

## 2022-08-22 DIAGNOSIS — E04.2 NON-TOXIC MULTINODULAR GOITER: ICD-10-CM

## 2022-08-22 DIAGNOSIS — E55.9 VITAMIN D DEFICIENCY: ICD-10-CM

## 2022-08-22 DIAGNOSIS — M85.80 OSTEOPENIA, UNSPECIFIED LOCATION: ICD-10-CM

## 2022-08-22 PROCEDURE — 76536 US EXAM OF HEAD AND NECK: CPT | Performed by: INTERNAL MEDICINE

## 2022-08-24 ENCOUNTER — HOSPITAL ENCOUNTER (OUTPATIENT)
Dept: LAB | Facility: MEDICAL CENTER | Age: 72
End: 2022-08-24
Attending: INTERNAL MEDICINE
Payer: MEDICARE

## 2022-08-24 DIAGNOSIS — M85.80 OSTEOPENIA, UNSPECIFIED LOCATION: ICD-10-CM

## 2022-08-24 DIAGNOSIS — E04.2 NON-TOXIC MULTINODULAR GOITER: ICD-10-CM

## 2022-08-24 DIAGNOSIS — E55.9 VITAMIN D DEFICIENCY: ICD-10-CM

## 2022-08-24 LAB
25(OH)D3 SERPL-MCNC: 53 NG/ML (ref 30–100)
ALBUMIN SERPL BCP-MCNC: 4.7 G/DL (ref 3.2–4.9)
ALBUMIN/GLOB SERPL: 1.8 G/DL
ALP SERPL-CCNC: 66 U/L (ref 30–99)
ALT SERPL-CCNC: 37 U/L (ref 2–50)
ANION GAP SERPL CALC-SCNC: 12 MMOL/L (ref 7–16)
AST SERPL-CCNC: 26 U/L (ref 12–45)
BILIRUB SERPL-MCNC: 0.8 MG/DL (ref 0.1–1.5)
BUN SERPL-MCNC: 13 MG/DL (ref 8–22)
CALCIUM SERPL-MCNC: 10 MG/DL (ref 8.5–10.5)
CHLORIDE SERPL-SCNC: 103 MMOL/L (ref 96–112)
CO2 SERPL-SCNC: 25 MMOL/L (ref 20–33)
CREAT SERPL-MCNC: 1.05 MG/DL (ref 0.5–1.4)
GFR SERPLBLD CREATININE-BSD FMLA CKD-EPI: 57 ML/MIN/1.73 M 2
GLOBULIN SER CALC-MCNC: 2.6 G/DL (ref 1.9–3.5)
GLUCOSE SERPL-MCNC: 94 MG/DL (ref 65–99)
PHOSPHATE SERPL-MCNC: 3.1 MG/DL (ref 2.5–4.5)
POTASSIUM SERPL-SCNC: 4.3 MMOL/L (ref 3.6–5.5)
PROT SERPL-MCNC: 7.3 G/DL (ref 6–8.2)
PTH-INTACT SERPL-MCNC: 48.1 PG/ML (ref 14–72)
SODIUM SERPL-SCNC: 140 MMOL/L (ref 135–145)
T4 FREE SERPL-MCNC: 1.24 NG/DL (ref 0.93–1.7)
TSH SERPL DL<=0.005 MIU/L-ACNC: 1 UIU/ML (ref 0.38–5.33)

## 2022-08-24 PROCEDURE — 84439 ASSAY OF FREE THYROXINE: CPT

## 2022-08-24 PROCEDURE — 84443 ASSAY THYROID STIM HORMONE: CPT

## 2022-08-24 PROCEDURE — 83970 ASSAY OF PARATHORMONE: CPT

## 2022-08-24 PROCEDURE — 82306 VITAMIN D 25 HYDROXY: CPT

## 2022-08-24 PROCEDURE — 36415 COLL VENOUS BLD VENIPUNCTURE: CPT

## 2022-08-24 PROCEDURE — 84100 ASSAY OF PHOSPHORUS: CPT

## 2022-08-24 PROCEDURE — 80053 COMPREHEN METABOLIC PANEL: CPT

## 2022-08-29 ENCOUNTER — OFFICE VISIT (OUTPATIENT)
Dept: ENDOCRINOLOGY | Facility: MEDICAL CENTER | Age: 72
End: 2022-08-29
Attending: INTERNAL MEDICINE
Payer: MEDICARE

## 2022-08-29 VITALS
HEART RATE: 89 BPM | WEIGHT: 175 LBS | OXYGEN SATURATION: 95 % | HEIGHT: 65 IN | BODY MASS INDEX: 29.16 KG/M2 | DIASTOLIC BLOOD PRESSURE: 70 MMHG | SYSTOLIC BLOOD PRESSURE: 124 MMHG

## 2022-08-29 DIAGNOSIS — E06.3 HASHIMOTO'S THYROIDITIS: ICD-10-CM

## 2022-08-29 DIAGNOSIS — Z78.0 POST-MENOPAUSAL: ICD-10-CM

## 2022-08-29 DIAGNOSIS — E55.9 VITAMIN D DEFICIENCY: ICD-10-CM

## 2022-08-29 DIAGNOSIS — E04.2 NON-TOXIC MULTINODULAR GOITER: ICD-10-CM

## 2022-08-29 DIAGNOSIS — M85.80 OSTEOPENIA, UNSPECIFIED LOCATION: ICD-10-CM

## 2022-08-29 PROCEDURE — 99211 OFF/OP EST MAY X REQ PHY/QHP: CPT | Performed by: INTERNAL MEDICINE

## 2022-08-29 PROCEDURE — 99214 OFFICE O/P EST MOD 30 MIN: CPT | Performed by: INTERNAL MEDICINE

## 2022-08-29 ASSESSMENT — FIBROSIS 4 INDEX: FIB4 SCORE: 1.12

## 2022-08-29 NOTE — PROGRESS NOTES
Chief Complaint: Follow up for positive anti-TPO antibody testing compatible with Hashimoto's but with normal TSH levels, history of hypercalcemia now resolved, osteopenia    HPI:     Lauren Jiang is a 72 y.o. female here for follow up of the above medical issues    I initially saw her as a referral for elevated TPO antibodies but with baseline normal TSH levels.  She has multiple somatic complaints which are not explained by her normal TSH levels.  This include fatigue, coldness, difficulty losing weight, dry skin and hair loss.   She has multiple thyroid nodules but no anterior neck compressive symptoms from her thyroid.   She denies a family history of thyroid cancer and denies radiation exposure to the head and neck.       Her other comorbid issues include estrogen receptor positive breast cancer treated with surgery and she is on aromatase inhibitor letrozole.      On her last office visit 6 months ago we gave her a therapeutic trial of levothyroxine because of her multiple complaints  She reports that taking thyroid hormone made her feel worse and she stopped taking the medication    Her repeat labs show that her TSH is normal at 1.0 on August 2022 while off thyroid hormone      She had an ultrasound in the office in August 2022 which showed a stable nodule in the right lower lobe, stable hypoechoic solid nodule measuring less than 1 cm on the right upper lobe and a stable left lower lobe nodule measuring 1.0 cm  I have recommended continued observation        She has baseline osteopenia from her bone density on September 16, 2019 with the lowest T score of -1.5 for the left hip. FRAX scores were not significantly elevated at 10.2% and 1.5% respectively for 10-year risk of any major fracture and hip fracture.   Work-up for bone loss was negative for myeloma and vitamin D deficiency and hyperparathyroidism and Paget's disease.     She is not on bisphosphonate therapy but is taking calcium vitamin D      I  ordered a bone density last time and she complained that it was not covered by insurance    I reordered her bone density again today    Her vitamin D was 53 with normal calcium levels in August 2022            Patient's medications, allergies, and social histories were reviewed and updated as appropriate.      ROS:     CONS:     No fever, no chills   EYES:     No diplopia, no blurry vision   CV:           No chest pain, no palpitations   PULM:     No SOB, no cough, no hemoptysis.   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  2019-04: Adjustment disorder with mixed anxiety and depressed mood  2018-04: Other constipation  2018-04: Vaginal burning  2017-08: Overweight (BMI 25.0-29.9)  2017-08: Atypical chest pain due to GERD  2017-08: CKD (chronic kidney disease) stage 3, GFR 30-59 ml/min ([facility])  2017-08: HTN (hypertension)  2016-08: Decreased GFR  2016-08: Chronic fatigue  2016-01: History of breast cancer  2016-01: Essential hypertension  2016-01: Glaucoma  2016-01: Mixed hyperlipidemia  2016-01: Gastroesophageal reflux disease without esophagitis  2016-01: Hot flashes  2016-01: Osteopenia  2016-01: Preventative health care    Past Surgical History:  Past Surgical History:   Procedure Laterality Date    HYSTERECTOMY LAPAROSCOPY  51 y/o    cervix and bilat ovaries removed.    KNEE ARTHROSCOPY  58 y/o    KNEE ARTHROSCOPY Right     MASTECTOMY  age 62    right mastectomy , 9 years ago    TONSILLECTOMY       "TONSILLECTOMY AND ADENOIDECTOMY  5 y/o        Allergies:  Patient has no known allergies.     Social History:  Social History     Tobacco Use    Smoking status: Former     Packs/day: 0.25     Years: 10.00     Pack years: 2.50     Types: Cigarettes     Quit date: 2000     Years since quittin.6    Smokeless tobacco: Never   Vaping Use    Vaping Use: Never used   Substance Use Topics    Alcohol use: Yes     Comment: glass of wine     Drug use: No        Family History:   family history includes Arthritis in her daughter and mother; Cancer in her maternal grandmother and sister; Diabetes in her maternal grandfather and maternal uncle; Heart Disease in her father; Hypertension in her mother; Lung Disease in her mother; No Known Problems in her daughter, paternal grandfather, and paternal grandmother; Stroke in her mother; Thyroid in her mother.      PHYSICAL EXAM:   Vital signs: /70 (BP Location: Left arm, Patient Position: Sitting, BP Cuff Size: Adult)   Pulse 89   Ht 1.651 m (5' 5\")   Wt 79.4 kg (175 lb)   SpO2 95%   BMI 29.12 kg/m²   GENERAL: Well-developed, well-nourished in no apparent distress.   EYE:  No ocular asymmetry, PERRLA  HENT: Pink, moist mucous membranes.    NECK: No thyromegaly.   CARDIOVASCULAR:  No murmurs  LUNGS: Clear breath sounds  ABDOMEN: Soft, nontender   EXTREMITIES: No clubbing, cyanosis, or edema.   NEUROLOGICAL: No gross focal motor abnormalities   LYMPH: No cervical adenopathy palpated.   SKIN: No rashes, lesions.       Labs:  Lab Results   Component Value Date/Time    SODIUM 140 2022 10:40 AM    POTASSIUM 4.3 2022 10:40 AM    CHLORIDE 103 2022 10:40 AM    CO2 25 2022 10:40 AM    ANION 12.0 2022 10:40 AM    GLUCOSE 94 2022 10:40 AM    BUN 13 2022 10:40 AM    CREATININE 1.05 2022 10:40 AM    CALCIUM 10.0 2022 10:40 AM    ASTSGOT 26 2022 10:40 AM    ALTSGPT 37 2022 10:40 AM    TBILIRUBIN 0.8 2022 10:40 " AM    ALBUMIN 4.7 2022 10:40 AM    ALBUMIN 4.28 2021 01:38 PM    TOTPROTEIN 7.3 2022 10:40 AM    TOTPROTEIN 7.0 2021 01:38 PM    GLOBULIN 2.6 2022 10:40 AM    AGRATIO 1.8 2022 10:40 AM       Lab Results   Component Value Date/Time    SODIUM 140 2021 1338    POTASSIUM 3.9 2021 1338    CHLORIDE 105 2021 1338    CO2 24 2021 1338    GLUCOSE 145 (H) 2021 1338    BUN 23 (H) 2021 1338    CREATININE 1.14 2021 1338    CALCIUM 10.1 2021 1338    ANION 11.0 2021 1338       Lab Results   Component Value Date/Time    CHOLSTRLTOT 166 2021 1029    TRIGLYCERIDE 135 2021 1029    HDL 55 2021 1029    LDL 84 2021 1029       Lab Results   Component Value Date/Time    TSHULTRASEN 0.460 2021 1338     Lab Results   Component Value Date/Time    FREET4 1.12 2021 1338     No results found for: FREET3  No results found for: THYSTIMIG    Lab Results   Component Value Date/Time    MICROSOMALA 97.0 (H) 2021 1029         Imagin2022 3:17 PM     HISTORY/REASON FOR EXAM:  Follow-up for multiple thyroid nodules     TECHNIQUE/EXAM DESCRIPTION:  Ultrasound of the soft tissues of the head and neck.     COMPARISON: Previous ultrasound on 2021     FINDINGS:  The thyroid gland is heterogeneous.  Vascularity is normal.     The right lobe of the thyroid gland measures 4.40 cm x 1.59 cm x 1.49 cm cm.  The left lobe of the thyroid gland measures 3.01 cm x 0.90 cm x 1.08 cm cm.  The isthmus measures 0.18 cm cm.     Nodules >= 1cm:        Nodule #1  There is a(n) isoechoic wider than tall solid nodule with smooth margins and no echogenic foci measuring 2.30 x 1.08 x 1.94 cm located on the right lower lobe.     Nodule #2  There is a(n) hypoechoic wider than tall solid nodule with smooth margins and comet tail or colloid artifacts measuring 0.66 x 0.61 x 0.76 cm located on the right upper lobe.     Nodule #3  There is  a(n) isoechoic wider than tall solid nodule with smooth margins and no echogenic foci measuring 0.95 x 0.86 x 1.02 cm located on the left lower lobe.        There are no suspicious lateral neck nodes seen     IMPRESSION:     Heterogenous thyroid gland with dominant:  Stable low suspicion isoechoic solid nodule measuring 2.3 cm on the right lower lobe  Intermediate suspicion hypoechoic solid nodule measuring up to 1 cm on the right upper lobe  Stable low suspicion isoechoic solid nodule measuring 1 cm located on the left lower lobe     BEAR Recommendations  Observation - repeat US in 6 to 12 months in the office.          US report completed and dictated by  NAV Garza MD [facility]        ASSESSMENT/PLAN:     1. Hashimoto's thyroiditis  Stable  She is euthyroid  There is no indication for thyroid replacement therapy at this time  Recommend continued observation  Repeat thyroid labs annually    2. Non-toxic multinodular goiter  Stable   She is euthyroid she denies compressive symptoms  I reviewed her thyroid ultrasound again from last August 22  Recommend observation   I am repeating her ultrasound in 1 year in the office    3. Osteopenia, unspecified location  Stable  Stable without interval falls or fracture  I am reordering her bone density      Return in about 1 year (around 8/29/2023).      Thank you kindly for allowing me to participate in the thyroid care plan for this patient.    NAV Garza MD, [facility]  08/24/21    CC:   Tre Britton M.D.

## 2022-10-17 ENCOUNTER — HOSPITAL ENCOUNTER (OUTPATIENT)
Dept: RADIOLOGY | Facility: MEDICAL CENTER | Age: 72
End: 2022-10-17
Attending: INTERNAL MEDICINE
Payer: MEDICARE

## 2022-10-17 DIAGNOSIS — Z78.0 POSTMENOPAUSAL: ICD-10-CM

## 2022-10-17 DIAGNOSIS — M85.80 OSTEOPENIA, UNSPECIFIED LOCATION: ICD-10-CM

## 2022-10-17 PROCEDURE — 77080 DXA BONE DENSITY AXIAL: CPT

## 2022-11-01 ENCOUNTER — HOSPITAL ENCOUNTER (OUTPATIENT)
Dept: RADIOLOGY | Facility: MEDICAL CENTER | Age: 72
End: 2022-11-01
Attending: INTERNAL MEDICINE
Payer: MEDICARE

## 2022-11-01 DIAGNOSIS — R53.82 CHRONIC FATIGUE: ICD-10-CM

## 2022-11-01 DIAGNOSIS — F51.04 CHRONIC INSOMNIA: ICD-10-CM

## 2022-11-01 DIAGNOSIS — R91.8 PULMONARY NODULES: ICD-10-CM

## 2022-11-01 DIAGNOSIS — Z85.3 HISTORY OF BREAST CANCER: ICD-10-CM

## 2022-11-01 DIAGNOSIS — E06.3 HASHIMOTO'S THYROIDITIS: ICD-10-CM

## 2022-11-01 DIAGNOSIS — G47.33 OSA (OBSTRUCTIVE SLEEP APNEA): ICD-10-CM

## 2022-11-01 DIAGNOSIS — I49.3 PVC'S (PREMATURE VENTRICULAR CONTRACTIONS): ICD-10-CM

## 2022-11-01 DIAGNOSIS — E66.3 OVERWEIGHT (BMI 25.0-29.9): ICD-10-CM

## 2022-11-01 PROCEDURE — 71250 CT THORAX DX C-: CPT

## 2022-11-16 ENCOUNTER — OFFICE VISIT (OUTPATIENT)
Dept: SLEEP MEDICINE | Facility: MEDICAL CENTER | Age: 72
End: 2022-11-16
Payer: MEDICARE

## 2022-11-16 VITALS
OXYGEN SATURATION: 95 % | BODY MASS INDEX: 29.16 KG/M2 | DIASTOLIC BLOOD PRESSURE: 88 MMHG | RESPIRATION RATE: 16 BRPM | SYSTOLIC BLOOD PRESSURE: 130 MMHG | HEART RATE: 102 BPM | HEIGHT: 65 IN | WEIGHT: 175 LBS

## 2022-11-16 DIAGNOSIS — R91.8 PULMONARY NODULES: ICD-10-CM

## 2022-11-16 DIAGNOSIS — G47.33 OSA (OBSTRUCTIVE SLEEP APNEA): ICD-10-CM

## 2022-11-16 DIAGNOSIS — F51.04 CHRONIC INSOMNIA: ICD-10-CM

## 2022-11-16 PROCEDURE — 99214 OFFICE O/P EST MOD 30 MIN: CPT | Performed by: INTERNAL MEDICINE

## 2022-11-16 ASSESSMENT — ENCOUNTER SYMPTOMS
FOCAL WEAKNESS: 0
SORE THROAT: 0
HEMOPTYSIS: 0
CHILLS: 0
ABDOMINAL PAIN: 0
MYALGIAS: 0
WEAKNESS: 0
DEPRESSION: 0
COUGH: 0
TREMORS: 0
DOUBLE VISION: 0
DIZZINESS: 0
PND: 0
SPEECH CHANGE: 0
CLAUDICATION: 0
FEVER: 0
DIARRHEA: 0
DIAPHORESIS: 0
STRIDOR: 0
PHOTOPHOBIA: 0
EYE PAIN: 0
EYE DISCHARGE: 0
CONSTIPATION: 0
BLURRED VISION: 0
PALPITATIONS: 0
VOMITING: 0
BACK PAIN: 0
EYE REDNESS: 0
NAUSEA: 0
FALLS: 0
SHORTNESS OF BREATH: 0
NECK PAIN: 0
ORTHOPNEA: 0
HEARTBURN: 0
SINUS PAIN: 0
SPUTUM PRODUCTION: 0
WHEEZING: 0
HEADACHES: 0
WEIGHT LOSS: 0

## 2022-11-16 ASSESSMENT — FIBROSIS 4 INDEX: FIB4 SCORE: 1.12

## 2022-11-16 NOTE — PROGRESS NOTES
Chief Complaint   Patient presents with    Follow-Up     Last seen 4/25/22 Dr. Ty     Apnea     Last seen 5/16/22 yany Aguirre          HPI: This patient is a 72 y.o. female whom is followed in our clinic for pulmonary nodules last seen by Dr. Ty on 4/25/22.  Past medical history significant for breast cancer for which she was on antiestrogen therapy for 10 years, dyslipidemia on statin, Hashimoto's thyroiditis.  She is a former tobacco smoker and quit in the year 2000 with less than 10-pack-year history.  She has chronic sinus congestion for which she sees ENT and has had issues with mouth breathing related to this.  She was referred to us mainly for finding of multiple pulmonary nodules up to 6 mm in size subpleural in location noted on CT chest from November 2021 which was done to evaluate dyspnea on exertion.  Pulmonary function testing from December 2021 showed normal airflows with normal lung volumes and normal DLCO.  She was also complaining of significant fatigue and underwent home sleep study which showed mild obstructive sleep apnea.  She has seen sleep medicine but is putting off evaluation at this point as she has a trip planned to [place] to visit her grandchild.  She presents today for follow-up CT done on November 1 showing stable subcentimeter pulmonary nodules with no new nodules or lymphadenopathy.  No parenchymal lung disease.  She denies cough.  When asked about her shortness of breath, her symptoms are more consistent with fatigue.  She reports joint pain when trying to increase her activity and general difficulty recovering from prolonged physical activity.    Past Medical History:   Diagnosis Date    Breast cancer (HCC) 2013    Cataract     CKD (chronic kidney disease) stage 3, GFR 30-59 ml/min (HCC)      Gallstones     Glaucoma     right eye    Hashimoto's thyroiditis 7/22/2020    Hyperlipidemia     Hypertension     Malignant neoplasm of upper-outer quadrant of right breast in  female, estrogen receptor positive (HCC) 2013    Complete mastectomy in 2013 with Dr. Garg.  Had Dr. Jay who recommended Femara for total of 10 yeas.    Uncontrolled daytime somnolence 2020    Unexplained night sweats 7/10/2020       Social History     Socioeconomic History    Marital status:      Spouse name: Not on file    Number of children: Not on file    Years of education: Not on file    Highest education level: Associate degree: occupational, technical, or vocational program   Occupational History     Comment: Retired LPN   Tobacco Use    Smoking status: Former     Packs/day: 0.25     Years: 10.00     Pack years: 2.50     Types: Cigarettes     Quit date: 2000     Years since quittin.8     Passive exposure: Past    Smokeless tobacco: Never   Vaping Use    Vaping Use: Never used   Substance and Sexual Activity    Alcohol use: Yes     Comment: glass of wine     Drug use: No    Sexual activity: Not on file     Comment: ; retired LPN   Other Topics Concern    Not on file   Social History Narrative    Not on file     Social Determinants of Health     Financial Resource Strain: Not on file   Food Insecurity: Not on file   Transportation Needs: Not on file   Physical Activity: Not on file   Stress: Not on file   Social Connections: Not on file   Intimate Partner Violence: Not on file   Housing Stability: Not on file       Family History   Problem Relation Age of Onset    Heart Disease Father         quadriple bypass     Cancer Sister         breast with mets    Hypertension Mother     Lung Disease Mother         smoker    Arthritis Mother         RA    Stroke Mother     Thyroid Mother     Diabetes Maternal Uncle         type I    Cancer Maternal Grandmother         Colon    Diabetes Maternal Grandfather         type II    Arthritis Daughter     No Known Problems Daughter     No Known Problems Paternal Grandmother     No Known Problems Paternal Grandfather        Current Outpatient  Medications on File Prior to Visit   Medication Sig Dispense Refill    pravastatin (PRAVACHOL) 20 MG Tab TAKE ONE TABLET BY MOUTH AT BEDTIME 100 Tablet 0    metoprolol tartrate (LOPRESSOR) 25 MG Tab Take 0.5 Tablets by mouth 2 times a day. 100 Tablet 3    amLODIPine (NORVASC) 5 MG Tab TAKE ONE TABLET BY MOUTH ONE TIME DAILY 90 Tablet 3    omeprazole (PRILOSEC) 20 MG delayed-release capsule Take 1 Capsule by mouth every morning before breakfast. 30 minutes before meal. 30 Capsule 3    vitamin D (CHOLECALCIFEROL) 1000 Unit (25 mcg) Tab Take 1 Tablet by mouth every day.      calcium carbonate (OS-OMERO 500) 1250 MG Tab Take 1,250 mg by mouth 2 times a day, with meals.       No current facility-administered medications on file prior to visit.       Patient has no known allergies.      ROS:   Review of Systems   Constitutional:  Positive for malaise/fatigue. Negative for chills, diaphoresis, fever and weight loss.   HENT:  Negative for congestion, ear discharge, ear pain, hearing loss, nosebleeds, sinus pain, sore throat and tinnitus.    Eyes:  Negative for blurred vision, double vision, photophobia, pain, discharge and redness.   Respiratory:  Negative for cough, hemoptysis, sputum production, shortness of breath, wheezing and stridor.    Cardiovascular:  Negative for chest pain, palpitations, orthopnea, claudication, leg swelling and PND.   Gastrointestinal:  Negative for abdominal pain, constipation, diarrhea, heartburn, nausea and vomiting.   Genitourinary:  Negative for dysuria and urgency.   Musculoskeletal:  Positive for joint pain. Negative for back pain, falls, myalgias and neck pain.   Skin:  Negative for itching and rash.   Neurological:  Negative for dizziness, tremors, speech change, focal weakness, weakness and headaches.   Endo/Heme/Allergies:  Negative for environmental allergies.   Psychiatric/Behavioral:  Negative for depression.      /88 (BP Location: Left arm, Patient Position: Sitting, BP Cuff  "Size: Adult)   Pulse (!) 102   Resp 16   Ht 1.651 m (5' 5\")   Wt 79.4 kg (175 lb)   SpO2 95%   Physical Exam  Constitutional:       General: She is not in acute distress.     Appearance: Normal appearance. She is well-developed and normal weight.   HENT:      Head: Normocephalic and atraumatic.      Right Ear: External ear normal.      Left Ear: External ear normal.      Nose: Nose normal. No congestion.      Mouth/Throat:      Mouth: Mucous membranes are moist.      Pharynx: Oropharynx is clear. No oropharyngeal exudate.   Eyes:      General: No scleral icterus.     Extraocular Movements: Extraocular movements intact.      Conjunctiva/sclera: Conjunctivae normal.      Pupils: Pupils are equal, round, and reactive to light.   Neck:      Vascular: No JVD.      Trachea: No tracheal deviation.   Cardiovascular:      Rate and Rhythm: Normal rate and regular rhythm.      Heart sounds: Normal heart sounds. No murmur heard.    No friction rub. No gallop.   Pulmonary:      Effort: Pulmonary effort is normal. No accessory muscle usage or respiratory distress.      Breath sounds: Normal breath sounds. No wheezing or rales.   Abdominal:      General: There is no distension.      Palpations: Abdomen is soft.      Tenderness: There is no abdominal tenderness.   Musculoskeletal:         General: No tenderness or deformity. Normal range of motion.      Cervical back: Normal range of motion and neck supple.      Right lower leg: No edema.      Left lower leg: No edema.   Lymphadenopathy:      Cervical: No cervical adenopathy.   Skin:     General: Skin is warm and dry.      Findings: No rash.      Nails: There is no clubbing.   Neurological:      Mental Status: She is alert and oriented to person, place, and time.      Cranial Nerves: No cranial nerve deficit.      Gait: Gait normal.   Psychiatric:         Behavior: Behavior normal.       PFTs as reviewed by me personally: as per hPI    Imaging as reviewed by me personally:  as " per HPI    Assessment:  1. Pulmonary nodules  CT-CHEST (THORAX) W/O      2. Chronic insomnia        3. MICHAEL (obstructive sleep apnea)            Plan:  Stable and patient is what I would consider a non-smoker based on relatively minimal tobacco history.  We will follow nodules for a minimum of 2 years with repeat CT in November 2023.  If stable we can likely stop surveillance imaging.  This is ongoing for the patient and she saw sleep medicine but did not feel insomnia was specifically addressed.  I encouraged her to follow-up with PCP and sleep medicine.  Also encourage patient to consider physical exercise with a  educated in geriatrics to see if this can help with both insomnia and her fatigue as well as dyspnea.  Mild and patient's larger complaint to me was insomnia.  Encouraged her to follow-up with sleep medicine.  Return in about 1 year (around 11/16/2023) for CT chest .

## 2022-11-18 DIAGNOSIS — Z85.3 HISTORY OF BREAST CANCER: ICD-10-CM

## 2022-11-18 DIAGNOSIS — I49.3 PVC'S (PREMATURE VENTRICULAR CONTRACTIONS): ICD-10-CM

## 2022-11-18 DIAGNOSIS — R53.82 CHRONIC FATIGUE: ICD-10-CM

## 2022-11-18 DIAGNOSIS — F51.04 CHRONIC INSOMNIA: ICD-10-CM

## 2022-11-18 DIAGNOSIS — E06.3 HASHIMOTO'S THYROIDITIS: ICD-10-CM

## 2022-11-18 DIAGNOSIS — E66.3 OVERWEIGHT (BMI 25.0-29.9): ICD-10-CM

## 2022-11-18 DIAGNOSIS — R91.8 PULMONARY NODULES: ICD-10-CM

## 2022-11-18 DIAGNOSIS — G47.33 OSA (OBSTRUCTIVE SLEEP APNEA): ICD-10-CM

## 2022-11-18 RX ORDER — OMEPRAZOLE 20 MG/1
CAPSULE, DELAYED RELEASE ORAL
Qty: 30 CAPSULE | Refills: 3 | Status: SHIPPED | OUTPATIENT
Start: 2022-11-18 | End: 2023-04-01

## 2022-11-18 NOTE — TELEPHONE ENCOUNTER
Caller Name: Lauren Jiang                 Call Back Number: [phone] (home)         Patient approves a detailed voicemail message: N\A    Have we ever prescribed this med? Yes.  If yes, what date? 04/25/22    Last OV: 11/16/22 w/ Dr. Soriano     Next OV: none     DX: History of breast cancer , Chronic fatigue     Medications:Omeprazole Oral Capsule Delayed Release 20 MG

## 2022-12-02 DIAGNOSIS — E78.5 HYPERLIPIDEMIA, UNSPECIFIED HYPERLIPIDEMIA TYPE: ICD-10-CM

## 2022-12-02 RX ORDER — PRAVASTATIN SODIUM 20 MG
TABLET ORAL
Qty: 100 TABLET | Refills: 0 | Status: SHIPPED | OUTPATIENT
Start: 2022-12-02 | End: 2023-03-20

## 2022-12-02 NOTE — TELEPHONE ENCOUNTER
Received request via: Pharmacy    Was the patient seen in the last year in this department? Yes  7/1/2022  Does the patient have an active prescription (recently filled or refills available) for medication(s) requested? No    Does the patient have intermediate Plus and need 100 day supply (blood pressure, diabetes and cholesterol meds only)? Yes, quantity updated to 100 days

## 2023-01-09 ENCOUNTER — TELEPHONE (OUTPATIENT)
Dept: MEDICAL GROUP | Facility: LAB | Age: 73
End: 2023-01-09
Payer: MEDICARE

## 2023-01-09 NOTE — TELEPHONE ENCOUNTER
ESTABLISHED PATIENT PRE-VISIT PLANNING     Patient was NOT contacted to complete PVP.     Note: Patient will not be contacted if there is no indication to call.     1.  Reviewed notes from the last few office visits within the medical group: Yes    2.  If any orders were placed at last visit or intended to be done for this visit (i.e. 6 mos follow-up), do we have Results/Consult Notes?           Labs - Labs ordered, completed on 7/1/22 and results are in chart.  Note: If patient appointment is for lab review and patient did not complete labs, check with provider if OK to reschedule patient until labs completed.         Imaging - Imaging was not ordered at last office visit.         Referrals - No referrals were ordered at last office visit.    3. Is this appointment scheduled as a Hospital Follow-Up? No    4.  Immunizations were updated in Epic using Reconcile Outside Information activity? Yes    5.  Patient is due for the following Health Maintenance Topics:   Health Maintenance Due   Topic Date Due    IMM ZOSTER VACCINES (2 of 3) 12/25/2017    Annual Wellness Visit  11/19/2022   6.  AHA (Pulse8) form printed for Provider? N/A

## 2023-01-23 NOTE — IP AVS SNAPSHOT
" <p align=\"LEFT\"><IMG SRC=\"[email or web address]\" alt=\"Image\" WIDTH=\"50%\" HEIGHT=\"200\" BORDER=\"\"></p>                   Name:Lauren Jiang  Medical Record Number:[id number]  CSN: [id number]    YOB: 1950   Age: 67 y.o.  Sex: female  HT:1.651 m (5' 5\") WT: 76.2 kg (167 lb 15.9 oz)          Admit Date: 8/16/2017     Discharge Date:   Today's Date: 8/16/2017  Attending Doctor:  Luz Maria Portillo M.D.                  Allergies:  Review of patient's allergies indicates no known allergies.          Your appointments     Aug 22, 2017  3:00 PM   Established Patient with SHERMAN Estrada   [address] [address]   [phone]           You will be receiving a confirmation call a few days before your appointment from our automated call confirmation system.                 Medication List      Take these Medications        Instructions    amlodipine-benazepril 5-10 MG per capsule   Commonly known as:  LOTREL    Take 1 Cap by mouth every day.   Dose:  1 Cap       anastrozole 1 MG Tabs   Commonly known as:  ARIMIDEX    Take 1 mg by mouth every day.   Dose:  1 mg       aspirin EC 81 MG Tbec   Commonly known as:  ECOTRIN    Take 81 mg by mouth as needed (For chest pain).   Dose:  81 mg       calcium carbonate 500 MG Tabs   Commonly known as:  OS-OMERO 500    Take 1,250 mg by mouth 2 times a day, with meals.   Dose:  1250 mg       CO Q 10 PO    Take 1 Cap by mouth every bedtime.   Dose:  1 Cap       pravastatin 20 MG Tabs   Commonly known as:  PRAVACHOL    Take 1 Tab by mouth every bedtime.   Dose:  20 mg       therapeutic multivitamin-minerals Tabs    Take 1 Tab by mouth every day.   Dose:  1 Tab       Vitamin D 2000 UNITS Caps    Take 1 Cap by mouth every evening.   Dose:  1 Cap         " Please advise

## 2023-03-11 DIAGNOSIS — E78.5 HYPERLIPIDEMIA, UNSPECIFIED HYPERLIPIDEMIA TYPE: ICD-10-CM

## 2023-03-20 RX ORDER — PRAVASTATIN SODIUM 20 MG
TABLET ORAL
Qty: 100 TABLET | Refills: 1 | Status: SHIPPED | OUTPATIENT
Start: 2023-03-20 | End: 2023-09-29

## 2023-03-31 DIAGNOSIS — R91.8 PULMONARY NODULES: ICD-10-CM

## 2023-03-31 DIAGNOSIS — G47.33 OSA (OBSTRUCTIVE SLEEP APNEA): ICD-10-CM

## 2023-03-31 DIAGNOSIS — E06.3 HASHIMOTO'S THYROIDITIS: ICD-10-CM

## 2023-03-31 DIAGNOSIS — I49.3 PVC'S (PREMATURE VENTRICULAR CONTRACTIONS): ICD-10-CM

## 2023-03-31 DIAGNOSIS — Z85.3 HISTORY OF BREAST CANCER: ICD-10-CM

## 2023-03-31 DIAGNOSIS — E66.3 OVERWEIGHT (BMI 25.0-29.9): ICD-10-CM

## 2023-03-31 DIAGNOSIS — F51.04 CHRONIC INSOMNIA: ICD-10-CM

## 2023-03-31 DIAGNOSIS — R53.82 CHRONIC FATIGUE: ICD-10-CM

## 2023-03-31 NOTE — TELEPHONE ENCOUNTER
Have we ever prescribed this med? Yes.  If yes, what date? 11/18/22    Last OV: 11/16/22 with Dr Soriano    Next OV: No pending appt    DX:     Medications:   Requested Prescriptions     Pending Prescriptions Disp Refills    omeprazole (PRILOSEC) 20 MG delayed-release capsule [Pharmacy Med Name: Omeprazole Oral Capsule Delayed Release 20 MG] 30 Capsule 0     Sig: TAKE ONE CAPSULE BY MOUTH EVERY MORNING 30 MINUTES BEFORE BREAKFAST

## 2023-04-01 RX ORDER — OMEPRAZOLE 20 MG/1
CAPSULE, DELAYED RELEASE ORAL
Qty: 30 CAPSULE | Refills: 0 | Status: SHIPPED | OUTPATIENT
Start: 2023-04-01 | End: 2023-05-01

## 2023-04-29 DIAGNOSIS — F51.04 CHRONIC INSOMNIA: ICD-10-CM

## 2023-04-29 DIAGNOSIS — E06.3 HASHIMOTO'S THYROIDITIS: ICD-10-CM

## 2023-04-29 DIAGNOSIS — G47.33 OSA (OBSTRUCTIVE SLEEP APNEA): ICD-10-CM

## 2023-04-29 DIAGNOSIS — R91.8 PULMONARY NODULES: ICD-10-CM

## 2023-04-29 DIAGNOSIS — E66.3 OVERWEIGHT (BMI 25.0-29.9): ICD-10-CM

## 2023-04-29 DIAGNOSIS — Z85.3 HISTORY OF BREAST CANCER: ICD-10-CM

## 2023-04-29 DIAGNOSIS — R53.82 CHRONIC FATIGUE: ICD-10-CM

## 2023-04-29 DIAGNOSIS — I49.3 PVC'S (PREMATURE VENTRICULAR CONTRACTIONS): ICD-10-CM

## 2023-05-01 RX ORDER — OMEPRAZOLE 20 MG/1
CAPSULE, DELAYED RELEASE ORAL
Qty: 30 CAPSULE | Refills: 0 | Status: SHIPPED | OUTPATIENT
Start: 2023-05-01 | End: 2023-07-17

## 2023-05-01 NOTE — TELEPHONE ENCOUNTER
Caller Name: Lauren Jiang                 Call Back Number: [phone] (home)         Patient approves a detailed voicemail message: N\A    Have we ever prescribed this med? Yes.  If yes, what date? 4/1/23     Last OV: 11/16/22 Dr. Soriano   Return in about 1 year (around 11/16/2023) for CT chest .  Next OV: none     DX: History of breast cancer (Z85.3); Chronic fatigue (R53.82); Overweight (BMI 25.0-29.9) (E66.3); Hashimoto's thyroiditis (E06.3); Pulmonary nodules (R91.8); Chronic insomnia (F51.04); MICHAEL (obstructive sleep apnea) (G47.33); PVC's (premature ventricular contractions) (I49.3)    Medications:  Current Outpatient Medications   Medication Sig Dispense Refill    omeprazole (PRILOSEC) 20 MG delayed-release capsule TAKE ONE CAPSULE BY MOUTH EVERY MORNING 30 MINUTES BEFORE BREAKFAST 30 Capsule 0    pravastatin (PRAVACHOL) 20 MG Tab TAKE ONE TABLET BY MOUTH AT BEDTIME 100 Tablet 1    metoprolol tartrate (LOPRESSOR) 25 MG Tab Take 0.5 Tablets by mouth 2 times a day. 100 Tablet 3    amLODIPine (NORVASC) 5 MG Tab TAKE ONE TABLET BY MOUTH ONE TIME DAILY 90 Tablet 3    vitamin D (CHOLECALCIFEROL) 1000 Unit (25 mcg) Tab Take 1 Tablet by mouth every day.      calcium carbonate (OS-OMERO 500) 1250 MG Tab Take 1,250 mg by mouth 2 times a day, with meals.       No current facility-administered medications for this visit.

## 2023-05-04 ENCOUNTER — TELEPHONE (OUTPATIENT)
Dept: HEALTH INFORMATION MANAGEMENT | Facility: OTHER | Age: 73
End: 2023-05-04

## 2023-05-26 DIAGNOSIS — I10 ESSENTIAL HYPERTENSION: ICD-10-CM

## 2023-05-30 RX ORDER — AMLODIPINE BESYLATE 5 MG/1
TABLET ORAL
Qty: 90 TABLET | Refills: 0 | Status: SHIPPED
Start: 2023-05-30 | End: 2023-11-03

## 2023-07-10 ENCOUNTER — HOSPITAL ENCOUNTER (OUTPATIENT)
Dept: RADIOLOGY | Facility: MEDICAL CENTER | Age: 73
End: 2023-07-10
Attending: INTERNAL MEDICINE
Payer: MEDICARE

## 2023-07-10 DIAGNOSIS — Z12.31 VISIT FOR SCREENING MAMMOGRAM: ICD-10-CM

## 2023-07-10 PROCEDURE — 77063 BREAST TOMOSYNTHESIS BI: CPT

## 2023-07-17 DIAGNOSIS — E06.3 HASHIMOTO'S THYROIDITIS: ICD-10-CM

## 2023-07-17 DIAGNOSIS — R91.8 PULMONARY NODULES: ICD-10-CM

## 2023-07-17 DIAGNOSIS — F51.04 CHRONIC INSOMNIA: ICD-10-CM

## 2023-07-17 DIAGNOSIS — G47.33 OSA (OBSTRUCTIVE SLEEP APNEA): ICD-10-CM

## 2023-07-17 DIAGNOSIS — R53.82 CHRONIC FATIGUE: ICD-10-CM

## 2023-07-17 DIAGNOSIS — Z85.3 HISTORY OF BREAST CANCER: ICD-10-CM

## 2023-07-17 DIAGNOSIS — E66.3 OVERWEIGHT (BMI 25.0-29.9): ICD-10-CM

## 2023-07-17 DIAGNOSIS — I49.3 PVC'S (PREMATURE VENTRICULAR CONTRACTIONS): ICD-10-CM

## 2023-07-17 RX ORDER — OMEPRAZOLE 20 MG/1
CAPSULE, DELAYED RELEASE ORAL
Qty: 30 CAPSULE | Refills: 3 | Status: SHIPPED
Start: 2023-07-17 | End: 2023-11-03

## 2023-07-17 NOTE — TELEPHONE ENCOUNTER
Have we ever prescribed this med? Yes.  If yes, what date? 05/01/23    Last OV: 11/16/22 with Dr Soriano    Next OV: No Pending appt.     DX:     Medications:   Requested Prescriptions     Pending Prescriptions Disp Refills    omeprazole (PRILOSEC) 20 MG delayed-release capsule [Pharmacy Med Name: Omeprazole Oral Capsule Delayed Release 20 MG] 30 Capsule 0     Sig: TAKE ONE CAPSULE BY MOUTH EVERY MORNING 30 MINUTES BEFORE BREAKFAST

## 2023-09-13 ENCOUNTER — HOSPITAL ENCOUNTER (OUTPATIENT)
Dept: LAB | Facility: MEDICAL CENTER | Age: 73
End: 2023-09-13
Attending: OPHTHALMOLOGY
Payer: MEDICARE

## 2023-09-13 PROCEDURE — 86255 FLUORESCENT ANTIBODY SCREEN: CPT

## 2023-09-13 PROCEDURE — 86256 FLUORESCENT ANTIBODY TITER: CPT

## 2023-09-13 PROCEDURE — 84445 ASSAY OF TSI GLOBULIN: CPT

## 2023-09-13 PROCEDURE — 83519 RIA NONANTIBODY: CPT | Mod: XU

## 2023-09-13 PROCEDURE — 36415 COLL VENOUS BLD VENIPUNCTURE: CPT

## 2023-09-13 PROCEDURE — 83520 IMMUNOASSAY QUANT NOS NONAB: CPT

## 2023-09-13 PROCEDURE — 83516 IMMUNOASSAY NONANTIBODY: CPT | Mod: XU

## 2023-09-14 ENCOUNTER — HOSPITAL ENCOUNTER (OUTPATIENT)
Dept: RADIOLOGY | Facility: MEDICAL CENTER | Age: 73
End: 2023-09-14
Attending: INTERNAL MEDICINE
Payer: MEDICARE

## 2023-09-14 DIAGNOSIS — R92.30 BREAST DENSITY: ICD-10-CM

## 2023-09-14 PROCEDURE — 76641 ULTRASOUND BREAST COMPLETE: CPT

## 2023-09-15 LAB
ACHR BIND AB SER-SCNC: 0.1 NMOL/L (ref 0–0.4)
ACHR BLOCK AB/ACHR TOTAL SFR SER: 8 % (ref 0–26)
TSH RECEP AB SER-ACNC: <0.8 IU/L
TSI SER-ACNC: <0.1 IU/L

## 2023-09-16 LAB
STRIA MUS IGG SER QL IF: DETECTED
STRIATED MUSC IGG TITER Q4408: ABNORMAL

## 2023-09-29 DIAGNOSIS — E78.5 HYPERLIPIDEMIA, UNSPECIFIED HYPERLIPIDEMIA TYPE: ICD-10-CM

## 2023-09-29 RX ORDER — PRAVASTATIN SODIUM 20 MG
TABLET ORAL
Qty: 100 TABLET | Refills: 0 | Status: SHIPPED | OUTPATIENT
Start: 2023-09-29 | End: 2024-02-09

## 2023-09-29 NOTE — TELEPHONE ENCOUNTER
Received request via: Pharmacy    Was the patient seen in the last year in this department? Yes  7/1/22  Does the patient have an active prescription (recently filled or refills available) for medication(s) requested? No    Does the patient have long term Plus and need 100 day supply (blood pressure, diabetes and cholesterol meds only)? Medication is not for cholesterol, blood pressure or diabetes

## 2023-10-20 ENCOUNTER — TELEPHONE (OUTPATIENT)
Dept: CARDIOLOGY | Facility: MEDICAL CENTER | Age: 73
End: 2023-10-20
Payer: MEDICARE

## 2023-10-20 DIAGNOSIS — I10 ESSENTIAL HYPERTENSION: ICD-10-CM

## 2023-11-01 ENCOUNTER — TELEPHONE (OUTPATIENT)
Dept: CARDIOLOGY | Facility: MEDICAL CENTER | Age: 73
End: 2023-11-01
Payer: MEDICARE

## 2023-11-02 DIAGNOSIS — I10 ESSENTIAL HYPERTENSION: ICD-10-CM

## 2023-11-02 NOTE — TELEPHONE ENCOUNTER
Reviewed chart, Rx's previously refused due to pt needing appt. Pt scheduled tomorrow 11/3/23 with [facility]. Courtesy refill sent for requested metoprolol.

## 2023-11-03 ENCOUNTER — OFFICE VISIT (OUTPATIENT)
Dept: CARDIOLOGY | Facility: MEDICAL CENTER | Age: 73
End: 2023-11-03
Attending: INTERNAL MEDICINE
Payer: MEDICARE

## 2023-11-03 VITALS
HEART RATE: 80 BPM | BODY MASS INDEX: 29.99 KG/M2 | DIASTOLIC BLOOD PRESSURE: 88 MMHG | OXYGEN SATURATION: 95 % | HEIGHT: 65 IN | RESPIRATION RATE: 16 BRPM | SYSTOLIC BLOOD PRESSURE: 150 MMHG | WEIGHT: 180 LBS

## 2023-11-03 DIAGNOSIS — E78.2 MIXED HYPERLIPIDEMIA: ICD-10-CM

## 2023-11-03 DIAGNOSIS — R73.03 PREDIABETES: ICD-10-CM

## 2023-11-03 DIAGNOSIS — I10 ESSENTIAL HYPERTENSION: ICD-10-CM

## 2023-11-03 PROCEDURE — 99212 OFFICE O/P EST SF 10 MIN: CPT | Performed by: INTERNAL MEDICINE

## 2023-11-03 PROCEDURE — 99214 OFFICE O/P EST MOD 30 MIN: CPT | Performed by: INTERNAL MEDICINE

## 2023-11-03 PROCEDURE — 3077F SYST BP >= 140 MM HG: CPT | Performed by: INTERNAL MEDICINE

## 2023-11-03 PROCEDURE — 3079F DIAST BP 80-89 MM HG: CPT | Performed by: INTERNAL MEDICINE

## 2023-11-03 RX ORDER — AMLODIPINE BESYLATE 5 MG/1
5 TABLET ORAL DAILY
Qty: 90 TABLET | Refills: 0 | Status: SHIPPED
Start: 2023-11-03 | End: 2023-11-16

## 2023-11-03 RX ORDER — METOPROLOL SUCCINATE 25 MG/1
25 TABLET, EXTENDED RELEASE ORAL DAILY
Qty: 100 TABLET | Refills: 3 | Status: SHIPPED | OUTPATIENT
Start: 2023-11-03

## 2023-11-03 RX ORDER — ERYTHROMYCIN 5 MG/G
OINTMENT OPHTHALMIC
COMMUNITY
Start: 2023-10-30 | End: 2023-11-29

## 2023-11-03 RX ORDER — ALPRAZOLAM 1 MG/1
TABLET ORAL
COMMUNITY
End: 2023-11-03

## 2023-11-03 ASSESSMENT — FIBROSIS 4 INDEX: FIB4 SCORE: 1.14

## 2023-11-03 NOTE — PROGRESS NOTES
"  Subjective     Katie Jiang is a 73-year-old woman with a history of symptomatic palpitations, hypertension, hyperlipidemia, Hashimoto's thyroiditis, and treated breast cancer, previously followed by Dr. Smith, Dr. Weeks, and Agnes Medina for shortness of breath and lower extremity edema     Per last clinic note \"In June 2021, Amlodipine was lowered from 10mg to 5mg (which helped with LE edema) and Metoprolol 12.5mg BID was added. Exercise treadmill test was also ordered, and completed on 9/17/2021... She is here today for follow-up. BP is stable, and LE edema is much improved. She is feeling better, but does still have some mild shortness of breath, mostly with exertion. No orthopnea or PND; she does admit to not being as active and has put on weight in the last year. No chest pain, pressure or discomfort; no palpitations; no dizziness or syncope. She does see Dr. Gimenez for management of her thyroid. She is wondering if any of her symptoms of fatigue and shortness of breath are related to her breast implant (after breast CA treatment); she is also a former smoker.\"    Today, she reports that her blood pressure was elevated as she has been off her blood pressure medications for approximately 3 weeks due to difficulties getting them refilled.  Otherwise, she has no acute complaints.  She does note that she has had some issues with double vision and is undergoing evaluation for possible myasthenia gravis by her Ophthalmologist.    No Known Allergies    Outpatient Encounter Medications as of 11/3/2023   Medication Sig Dispense Refill    erythromycin 5 MG/GM Ointment       metoprolol SR (TOPROL XL) 25 MG TABLET SR 24 HR Take 1 Tablet by mouth every day. 100 Tablet 3    pravastatin (PRAVACHOL) 20 MG Tab TAKE ONE TABLET BY MOUTH AT BEDTIME 100 Tablet 0    vitamin D (CHOLECALCIFEROL) 1000 Unit (25 mcg) Tab Take 1 Tablet by mouth every day.      calcium carbonate (OS-OEMRO 500) 1250 MG Tab Take 1,250 mg by mouth 2 " "times a day, with meals.      ALPRAZolam (XANAX) 1 MG Tab Take 1 tablet  2 hours prior to procedure then take 1 tablet 1 hour prior to procedure (G89.18) Orally Twice a day for 1 days (Patient not taking: Reported on 11/3/2023)      [DISCONTINUED] metoprolol tartrate (LOPRESSOR) 25 MG Tab TAKE HALF TABLET BY MOUTH TWICE DAILY 100 Tablet 0    [DISCONTINUED] omeprazole (PRILOSEC) 20 MG delayed-release capsule TAKE ONE CAPSULE BY MOUTH EVERY MORNING 30 MINUTES BEFORE BREAKFAST 30 Capsule 3    [DISCONTINUED] amLODIPine (NORVASC) 5 MG Tab TAKE ONE TABLET BY MOUTH ONE TIME DAILY 90 Tablet 0    [DISCONTINUED] metoprolol tartrate (LOPRESSOR) 25 MG Tab Take 0.5 Tablets by mouth 2 times a day. 100 Tablet 3     No facility-administered encounter medications on file as of 11/3/2023.          Objective     Vitals:  BP (!) 150/88 (BP Location: Left arm, Patient Position: Sitting)   Pulse 80   Resp 16   Ht 1.651 m (5' 5\")   Wt 81.6 kg (180 lb)   SpO2 95%   BMI 29.95 kg/m²     Physical Exam:  General: Well-appearing, no acute distress  Eyes: Extraocular movements intact, anicteric  Neck: Full range of motion, no gross jugular venous distension  Pulmonary: Normal respiratory effort, no distress  Cardiovascular: Regular rate  Extremities: No gross lower extremity edema  Neurological: Alert and oriented, no gross focal motor deficits  Psychiatric: Normal affect    Laboratories:  Lipids  Lab Results   Component Value Date/Time    LDL 84 01/11/2021 10:29 AM    LDL 75 07/13/2020 11:51 AM    LDL 86 08/15/2019 11:17 AM    LDL 63 06/20/2018 08:36 AM    LDL 79 08/21/2017 07:31 AM       No results found for: \"LDLCALC\"  Lab Results   Component Value Date/Time    HDL 55 01/11/2021 10:29 AM    HDL 65 07/13/2020 11:51 AM    HDL 61 08/15/2019 11:17 AM    HDL 53 06/20/2018 08:36 AM    HDL 57 08/21/2017 07:31 AM       Lab Results   Component Value Date/Time    TRIGLYCERIDE 135 01/11/2021 10:29 AM    TRIGLYCERIDE 103 07/13/2020 11:51 AM    " "TRIGLYCERIDE 145 08/15/2019 11:17 AM    TRIGLYCERIDE 129 06/20/2018 08:36 AM    TRIGLYCERIDE 128 08/21/2017 07:31 AM       Lab Results   Component Value Date/Time    CHOLSTRLTOT 166 01/11/2021 10:29 AM    CHOLSTRLTOT 161 07/13/2020 11:51 AM    CHOLSTRLTOT 176 08/15/2019 11:17 AM    CHOLSTRLTOT 142 06/20/2018 08:36 AM    CHOLSTRLTOT 162 08/21/2017 07:31 AM       No results found for: \"LIPOPROTA\"      Chemistries  Lab Results   Component Value Date/Time    CREATININE 1.05 08/24/2022 10:40 AM    CREATININE 1.15 07/01/2022 11:05 AM    CREATININE 1.10 02/04/2022 10:06 AM    CREATININE 1.02 11/19/2021 10:57 AM    CREATININE 1.14 04/27/2021 01:38 PM     Lab Results   Component Value Date/Time    BUN 13 08/24/2022 10:40 AM    BUN 15 07/01/2022 11:05 AM    BUN 24 (H) 02/04/2022 10:06 AM    BUN 16 11/19/2021 10:57 AM    BUN 23 (H) 04/27/2021 01:38 PM     Lab Results   Component Value Date/Time    POTASSIUM 4.3 08/24/2022 10:40 AM    POTASSIUM 4.2 07/01/2022 11:05 AM    POTASSIUM 4.0 02/04/2022 10:06 AM     Lab Results   Component Value Date/Time    SODIUM 140 08/24/2022 10:40 AM    SODIUM 138 07/01/2022 11:05 AM    SODIUM 140 02/04/2022 10:06 AM     Lab Results   Component Value Date/Time    GLUCOSE 94 08/24/2022 10:40 AM    GLUCOSE 76 07/01/2022 11:05 AM    GLUCOSE 93 02/04/2022 10:06 AM     Lab Results   Component Value Date/Time    ASTSGOT 26 08/24/2022 10:40 AM    ASTSGOT 23 07/01/2022 11:05 AM    ASTSGOT 22 02/04/2022 10:06 AM     Lab Results   Component Value Date/Time    ALTSGPT 37 08/24/2022 10:40 AM    ALTSGPT 29 07/01/2022 11:05 AM    ALTSGPT 26 02/04/2022 10:06 AM     Lab Results   Component Value Date/Time    ALKPHOSPHAT 66 08/24/2022 10:40 AM    ALKPHOSPHAT 73 07/01/2022 11:05 AM    ALKPHOSPHAT 67 02/04/2022 10:06 AM     No results found for: \"HBA1C\"  No results found for: \"TSH\"  No results found for: \"NTPROBNP\"  No results found for: \"TROPONINT\"    Blood Counts  Lab Results   Component Value Date/Time    " HEMOGLOBIN 15.3 11/19/2021 10:57 AM    HEMOGLOBIN 15.1 04/27/2021 01:38 PM    HEMOGLOBIN 15.4 07/13/2020 11:51 AM     Lab Results   Component Value Date/Time    PLATELETCT 270 11/19/2021 10:57 AM    PLATELETCT 303 04/27/2021 01:38 PM    PLATELETCT 275 07/13/2020 11:51 AM     Lab Results   Component Value Date/Time    WBC 6.8 11/19/2021 10:57 AM    WBC 16.5 (H) 04/27/2021 01:38 PM    WBC 5.0 07/13/2020 11:51 AM     STUDIES:  ECHOCARDIOGRAM 6/22/2021:  Normal left ventricular chamber size.  Left ventricular ejection fraction is visually estimated to be 55%.  Indeterminate diastolic function.  Mild mitral regurgitation.  Estimated right ventricular systolic pressure  is 40 mmHg.  Normal inferior vena cava size and inspiratory collapse.     ETT 9/17/2021:  Normal treadmill stress test.  Average exercise tolerance for age.  Blunted BP response to exercise, it is possible resting BP was not   assessed immediately prior.  Sinus rhythm.   No ischemic ECG changes. 0.5 mm up sloping ST depression.  No chest pain during stress.    Assessment & Plan     Medical Decision Making:       # Hypertension: Her blood pressure was uncontrolled today in clinic, however, she has been off of her medications for several weeks.  -Change to metoprolol XL 25 mg daily  -Discontinue amlodipine  -Check laboratories and pending results start olmesartan 20 mg nightly  -Repeat BMP and 1-2 weeks to assess potentially serious/life-threatening electrolyte abnormalities or renal dysfunction    # Chronic kidney disease, stage IIIa: Given mild chronic kidney disease, will change from amlodipine to olmesartan as above    # Hyperlipidemia: Her lipid levels are acceptable for her level of risk.  -Continue pravastatin 20 mg nightly      Thomas De Paz MD  Interventional Cardiology

## 2023-11-03 NOTE — TELEPHONE ENCOUNTER
Received request via: Pharmacy    Was the patient seen in the last year in this department? No  7/1/22  Does the patient have an active prescription (recently filled or refills available) for medication(s) requested? No    Does the patient have longterm Plus and need 100 day supply (blood pressure, diabetes and cholesterol meds only)? Medication is not for cholesterol, blood pressure or diabetes

## 2023-11-04 ENCOUNTER — HOSPITAL ENCOUNTER (OUTPATIENT)
Dept: LAB | Facility: MEDICAL CENTER | Age: 73
End: 2023-11-04
Attending: INTERNAL MEDICINE
Payer: MEDICARE

## 2023-11-04 DIAGNOSIS — R73.03 PREDIABETES: ICD-10-CM

## 2023-11-04 DIAGNOSIS — E78.2 MIXED HYPERLIPIDEMIA: ICD-10-CM

## 2023-11-04 DIAGNOSIS — I10 ESSENTIAL HYPERTENSION: ICD-10-CM

## 2023-11-04 LAB
ALBUMIN SERPL BCP-MCNC: 4.4 G/DL (ref 3.2–4.9)
ALBUMIN/GLOB SERPL: 1.7 G/DL
ALP SERPL-CCNC: 76 U/L (ref 30–99)
ALT SERPL-CCNC: 31 U/L (ref 2–50)
ANION GAP SERPL CALC-SCNC: 10 MMOL/L (ref 7–16)
AST SERPL-CCNC: 22 U/L (ref 12–45)
BILIRUB SERPL-MCNC: 0.6 MG/DL (ref 0.1–1.5)
BUN SERPL-MCNC: 16 MG/DL (ref 8–22)
CALCIUM ALBUM COR SERPL-MCNC: 9.3 MG/DL (ref 8.5–10.5)
CALCIUM SERPL-MCNC: 9.6 MG/DL (ref 8.4–10.2)
CHLORIDE SERPL-SCNC: 106 MMOL/L (ref 96–112)
CHOLEST SERPL-MCNC: 163 MG/DL (ref 100–199)
CO2 SERPL-SCNC: 25 MMOL/L (ref 20–33)
CREAT SERPL-MCNC: 0.95 MG/DL (ref 0.5–1.4)
EST. AVERAGE GLUCOSE BLD GHB EST-MCNC: 111 MG/DL
FASTING STATUS PATIENT QL REPORTED: NORMAL
GFR SERPLBLD CREATININE-BSD FMLA CKD-EPI: 64 ML/MIN/1.73 M 2
GLOBULIN SER CALC-MCNC: 2.6 G/DL (ref 1.9–3.5)
GLUCOSE SERPL-MCNC: 101 MG/DL (ref 65–99)
HBA1C MFR BLD: 5.5 % (ref 4–5.6)
HDLC SERPL-MCNC: 61 MG/DL
LDLC SERPL CALC-MCNC: 80 MG/DL
POTASSIUM SERPL-SCNC: 4.1 MMOL/L (ref 3.6–5.5)
PROT SERPL-MCNC: 7 G/DL (ref 6–8.2)
SODIUM SERPL-SCNC: 141 MMOL/L (ref 135–145)
T4 FREE SERPL-MCNC: 1.41 NG/DL (ref 0.93–1.7)
TRIGL SERPL-MCNC: 112 MG/DL (ref 0–149)
TSH SERPL DL<=0.005 MIU/L-ACNC: 0.24 UIU/ML (ref 0.38–5.33)

## 2023-11-04 PROCEDURE — 80053 COMPREHEN METABOLIC PANEL: CPT

## 2023-11-04 PROCEDURE — 84443 ASSAY THYROID STIM HORMONE: CPT

## 2023-11-04 PROCEDURE — 84439 ASSAY OF FREE THYROXINE: CPT

## 2023-11-04 PROCEDURE — 80061 LIPID PANEL: CPT

## 2023-11-04 PROCEDURE — 83036 HEMOGLOBIN GLYCOSYLATED A1C: CPT

## 2023-11-04 PROCEDURE — 36415 COLL VENOUS BLD VENIPUNCTURE: CPT

## 2023-11-08 ENCOUNTER — PATIENT MESSAGE (OUTPATIENT)
Dept: CARDIOLOGY | Facility: MEDICAL CENTER | Age: 73
End: 2023-11-08
Payer: MEDICARE

## 2023-11-13 ENCOUNTER — PATIENT MESSAGE (OUTPATIENT)
Dept: CARDIOLOGY | Facility: MEDICAL CENTER | Age: 73
End: 2023-11-13
Payer: MEDICARE

## 2023-11-13 NOTE — PATIENT COMMUNICATION
To [facility]: Patient sent MyChart requesting review of her lab work and if she is to start olmesartan or not, as noted in recent office visit. Please review and advise, thank you!

## 2023-11-14 NOTE — PROGRESS NOTES
Yes, please have her stop amlodipine and start olmesartan 20 mg taken at night. She needs a repeat BMP after this change in 1-2 weeks. Thanks.

## 2023-11-15 ENCOUNTER — HOSPITAL ENCOUNTER (OUTPATIENT)
Dept: RADIOLOGY | Facility: MEDICAL CENTER | Age: 73
End: 2023-11-15
Attending: INTERNAL MEDICINE
Payer: MEDICARE

## 2023-11-15 ENCOUNTER — PATIENT MESSAGE (OUTPATIENT)
Dept: CARDIOLOGY | Facility: MEDICAL CENTER | Age: 73
End: 2023-11-15

## 2023-11-15 DIAGNOSIS — I10 ESSENTIAL HYPERTENSION: ICD-10-CM

## 2023-11-15 DIAGNOSIS — R91.8 PULMONARY NODULES: ICD-10-CM

## 2023-11-15 PROCEDURE — 71250 CT THORAX DX C-: CPT

## 2023-11-16 RX ORDER — OLMESARTAN MEDOXOMIL 20 MG/1
20 TABLET ORAL NIGHTLY
Qty: 90 TABLET | Refills: 3 | Status: SHIPPED | OUTPATIENT
Start: 2023-11-16

## 2023-11-16 NOTE — PATIENT COMMUNICATION
[employer] message sent to patient, awaiting patient response and will follow up as needed. Medication list updated and lab orders placed.

## 2023-11-27 ENCOUNTER — HOSPITAL ENCOUNTER (OUTPATIENT)
Dept: LAB | Facility: MEDICAL CENTER | Age: 73
End: 2023-11-27
Attending: INTERNAL MEDICINE
Payer: MEDICARE

## 2023-11-27 ENCOUNTER — HOSPITAL ENCOUNTER (OUTPATIENT)
Dept: LAB | Facility: MEDICAL CENTER | Age: 73
End: 2023-11-27
Attending: PSYCHIATRY & NEUROLOGY
Payer: MEDICARE

## 2023-11-27 DIAGNOSIS — I10 ESSENTIAL HYPERTENSION: ICD-10-CM

## 2023-11-27 LAB
ANION GAP SERPL CALC-SCNC: 12 MMOL/L (ref 7–16)
BUN SERPL-MCNC: 13 MG/DL (ref 8–22)
CALCIUM SERPL-MCNC: 10.1 MG/DL (ref 8.5–10.5)
CHLORIDE SERPL-SCNC: 106 MMOL/L (ref 96–112)
CO2 SERPL-SCNC: 25 MMOL/L (ref 20–33)
CREAT SERPL-MCNC: 0.89 MG/DL (ref 0.5–1.4)
GFR SERPLBLD CREATININE-BSD FMLA CKD-EPI: 69 ML/MIN/1.73 M 2
GLUCOSE SERPL-MCNC: 86 MG/DL (ref 65–99)
POTASSIUM SERPL-SCNC: 4.3 MMOL/L (ref 3.6–5.5)
SODIUM SERPL-SCNC: 143 MMOL/L (ref 135–145)

## 2023-11-27 PROCEDURE — 86255 FLUORESCENT ANTIBODY SCREEN: CPT

## 2023-11-27 PROCEDURE — 36415 COLL VENOUS BLD VENIPUNCTURE: CPT

## 2023-11-27 PROCEDURE — 80048 BASIC METABOLIC PNL TOTAL CA: CPT

## 2023-11-29 ENCOUNTER — OFFICE VISIT (OUTPATIENT)
Dept: MEDICAL GROUP | Facility: PHYSICIAN GROUP | Age: 73
End: 2023-11-29
Payer: MEDICARE

## 2023-11-29 VITALS
RESPIRATION RATE: 14 BRPM | OXYGEN SATURATION: 94 % | HEIGHT: 65 IN | SYSTOLIC BLOOD PRESSURE: 136 MMHG | WEIGHT: 179 LBS | BODY MASS INDEX: 29.82 KG/M2 | TEMPERATURE: 97.8 F | HEART RATE: 87 BPM | DIASTOLIC BLOOD PRESSURE: 80 MMHG

## 2023-11-29 DIAGNOSIS — E06.3 HASHIMOTO'S THYROIDITIS: ICD-10-CM

## 2023-11-29 DIAGNOSIS — C50.411 MALIGNANT NEOPLASM OF UPPER-OUTER QUADRANT OF RIGHT BREAST IN FEMALE, ESTROGEN RECEPTOR POSITIVE (HCC): ICD-10-CM

## 2023-11-29 DIAGNOSIS — E04.2 NON-TOXIC MULTINODULAR GOITER: ICD-10-CM

## 2023-11-29 DIAGNOSIS — J32.4 CHRONIC PANSINUSITIS: ICD-10-CM

## 2023-11-29 DIAGNOSIS — N18.31 STAGE 3A CHRONIC KIDNEY DISEASE: ICD-10-CM

## 2023-11-29 DIAGNOSIS — E66.3 OVERWEIGHT (BMI 25.0-29.9): ICD-10-CM

## 2023-11-29 DIAGNOSIS — Z17.0 MALIGNANT NEOPLASM OF UPPER-OUTER QUADRANT OF RIGHT BREAST IN FEMALE, ESTROGEN RECEPTOR POSITIVE (HCC): ICD-10-CM

## 2023-11-29 DIAGNOSIS — R53.82 CHRONIC FATIGUE: ICD-10-CM

## 2023-11-29 DIAGNOSIS — H40.10X0 OPEN-ANGLE GLAUCOMA OF RIGHT EYE, UNSPECIFIED GLAUCOMA STAGE, UNSPECIFIED OPEN-ANGLE GLAUCOMA TYPE: ICD-10-CM

## 2023-11-29 DIAGNOSIS — Z85.3 HISTORY OF BREAST CANCER: ICD-10-CM

## 2023-11-29 DIAGNOSIS — I10 ESSENTIAL HYPERTENSION: ICD-10-CM

## 2023-11-29 DIAGNOSIS — Z23 NEED FOR VACCINATION: ICD-10-CM

## 2023-11-29 DIAGNOSIS — G31.9 DEGENERATIVE DISEASE OF NERVOUS SYSTEM, UNSPECIFIED (HCC): ICD-10-CM

## 2023-11-29 DIAGNOSIS — M85.89 OSTEOPENIA OF MULTIPLE SITES: ICD-10-CM

## 2023-11-29 DIAGNOSIS — E78.2 MIXED HYPERLIPIDEMIA: ICD-10-CM

## 2023-11-29 DIAGNOSIS — H53.2 DIPLOPIA: ICD-10-CM

## 2023-11-29 PROBLEM — Z00.00 HEALTH CARE MAINTENANCE: Status: RESOLVED | Noted: 2021-05-20 | Resolved: 2023-11-29

## 2023-11-29 PROBLEM — E83.52 HYPERCALCEMIA: Status: RESOLVED | Noted: 2021-04-27 | Resolved: 2023-11-29

## 2023-11-29 LAB — MUSK AB SER QL: NORMAL

## 2023-11-29 PROCEDURE — 99214 OFFICE O/P EST MOD 30 MIN: CPT | Mod: 25 | Performed by: NURSE PRACTITIONER

## 2023-11-29 PROCEDURE — 90662 IIV NO PRSV INCREASED AG IM: CPT | Performed by: NURSE PRACTITIONER

## 2023-11-29 PROCEDURE — G0008 ADMIN INFLUENZA VIRUS VAC: HCPCS | Performed by: NURSE PRACTITIONER

## 2023-11-29 PROCEDURE — 3075F SYST BP GE 130 - 139MM HG: CPT | Performed by: NURSE PRACTITIONER

## 2023-11-29 PROCEDURE — 3079F DIAST BP 80-89 MM HG: CPT | Performed by: NURSE PRACTITIONER

## 2023-11-29 RX ORDER — AMLODIPINE BESYLATE 2.5 MG/1
TABLET ORAL
COMMUNITY
End: 2023-11-29

## 2023-11-29 SDOH — HEALTH STABILITY: PHYSICAL HEALTH: ON AVERAGE, HOW MANY MINUTES DO YOU ENGAGE IN EXERCISE AT THIS LEVEL?: 20 MIN

## 2023-11-29 SDOH — ECONOMIC STABILITY: FOOD INSECURITY: WITHIN THE PAST 12 MONTHS, THE FOOD YOU BOUGHT JUST DIDN'T LAST AND YOU DIDN'T HAVE MONEY TO GET MORE.: NEVER TRUE

## 2023-11-29 SDOH — ECONOMIC STABILITY: FOOD INSECURITY: WITHIN THE PAST 12 MONTHS, YOU WORRIED THAT YOUR FOOD WOULD RUN OUT BEFORE YOU GOT MONEY TO BUY MORE.: NEVER TRUE

## 2023-11-29 SDOH — ECONOMIC STABILITY: INCOME INSECURITY: HOW HARD IS IT FOR YOU TO PAY FOR THE VERY BASICS LIKE FOOD, HOUSING, MEDICAL CARE, AND HEATING?: NOT HARD AT ALL

## 2023-11-29 SDOH — ECONOMIC STABILITY: TRANSPORTATION INSECURITY
IN THE PAST 12 MONTHS, HAS LACK OF TRANSPORTATION KEPT YOU FROM MEETINGS, WORK, OR FROM GETTING THINGS NEEDED FOR DAILY LIVING?: NO

## 2023-11-29 SDOH — ECONOMIC STABILITY: HOUSING INSECURITY: IN THE LAST 12 MONTHS, HOW MANY PLACES HAVE YOU LIVED?: 1

## 2023-11-29 SDOH — ECONOMIC STABILITY: INCOME INSECURITY: IN THE LAST 12 MONTHS, WAS THERE A TIME WHEN YOU WERE NOT ABLE TO PAY THE MORTGAGE OR RENT ON TIME?: NO

## 2023-11-29 SDOH — HEALTH STABILITY: MENTAL HEALTH
STRESS IS WHEN SOMEONE FEELS TENSE, NERVOUS, ANXIOUS, OR CAN'T SLEEP AT NIGHT BECAUSE THEIR MIND IS TROUBLED. HOW STRESSED ARE YOU?: TO SOME EXTENT

## 2023-11-29 SDOH — HEALTH STABILITY: PHYSICAL HEALTH: ON AVERAGE, HOW MANY DAYS PER WEEK DO YOU ENGAGE IN MODERATE TO STRENUOUS EXERCISE (LIKE A BRISK WALK)?: 6 DAYS

## 2023-11-29 ASSESSMENT — SOCIAL DETERMINANTS OF HEALTH (SDOH)
HOW OFTEN DO YOU ATTEND CHURCH OR RELIGIOUS SERVICES?: MORE THAN 4 TIMES PER YEAR
HOW OFTEN DO YOU ATTEND CHURCH OR RELIGIOUS SERVICES?: MORE THAN 4 TIMES PER YEAR
IN A TYPICAL WEEK, HOW MANY TIMES DO YOU TALK ON THE PHONE WITH FAMILY, FRIENDS, OR NEIGHBORS?: ONCE A WEEK
HOW MANY DRINKS CONTAINING ALCOHOL DO YOU HAVE ON A TYPICAL DAY WHEN YOU ARE DRINKING: 1 OR 2
HOW OFTEN DO YOU HAVE SIX OR MORE DRINKS ON ONE OCCASION: NEVER
HOW OFTEN DO YOU GET TOGETHER WITH FRIENDS OR RELATIVES?: ONCE A WEEK
WITHIN THE PAST 12 MONTHS, YOU WORRIED THAT YOUR FOOD WOULD RUN OUT BEFORE YOU GOT THE MONEY TO BUY MORE: NEVER TRUE
IN A TYPICAL WEEK, HOW MANY TIMES DO YOU TALK ON THE PHONE WITH FAMILY, FRIENDS, OR NEIGHBORS?: ONCE A WEEK
HOW HARD IS IT FOR YOU TO PAY FOR THE VERY BASICS LIKE FOOD, HOUSING, MEDICAL CARE, AND HEATING?: NOT HARD AT ALL
HOW OFTEN DO YOU GET TOGETHER WITH FRIENDS OR RELATIVES?: ONCE A WEEK
HOW OFTEN DO YOU HAVE A DRINK CONTAINING ALCOHOL: 2-3 TIMES A WEEK

## 2023-11-29 ASSESSMENT — ENCOUNTER SYMPTOMS
CARDIOVASCULAR NEGATIVE: 1
DEPRESSION: 0
EYES NEGATIVE: 1
CHILLS: 0
FEVER: 0
RESPIRATORY NEGATIVE: 1
DIZZINESS: 0
NERVOUS/ANXIOUS: 0
MUSCULOSKELETAL NEGATIVE: 1
WEIGHT LOSS: 0
HEADACHES: 0
GASTROINTESTINAL NEGATIVE: 1
SHORTNESS OF BREATH: 0
PALPITATIONS: 0

## 2023-11-29 ASSESSMENT — PATIENT HEALTH QUESTIONNAIRE - PHQ9: CLINICAL INTERPRETATION OF PHQ2 SCORE: 0

## 2023-11-29 ASSESSMENT — LIFESTYLE VARIABLES
HOW OFTEN DO YOU HAVE SIX OR MORE DRINKS ON ONE OCCASION: NEVER
HOW MANY STANDARD DRINKS CONTAINING ALCOHOL DO YOU HAVE ON A TYPICAL DAY: 1 OR 2
HOW OFTEN DO YOU HAVE A DRINK CONTAINING ALCOHOL: 2-3 TIMES A WEEK
AUDIT-C TOTAL SCORE: 3
SKIP TO QUESTIONS 9-10: 1

## 2023-11-29 NOTE — PROGRESS NOTES
Chief Complaint   Patient presents with    Establish Care      Subjective:     Lauren Jiang is a 73 y.o. female presenting for      Problem   Diplopia    Intermittent   States she was testing for Myasthenias gravis  Is following with neurology - Dr kang   Is getting worked up for cyst on brain and Myasthenia Gravis      Degenerative Disease of Nervous System, Unspecified (Hcc)    following with Neurology- Dr. Kang  being worked up for Myasthenia Gravis     Chronic Pansinusitis    Followed with ENT- Russell  Is noticing worsening issues plans to follow back up with ENT  Having mouth breathing again   Notes sinus pressure with barometric pressure changes.      Non-Toxic Multinodular Goiter    Was following with Dr. Pernell Espino thyroids levels  Was taking synthroid but she felt terrible on it and stopped taking it   Latest Reference Range & Units 11/04/23 09:34   TSH 0.380 - 5.330 uIU/mL 0.245 (L)   Free T-4 0.93 - 1.70 ng/dL 1.41   (L): Data is abnormally low     Hashimoto's Thyroiditis    Was following with Dr. Pernell Espino thyroids levels  Was taking synthroid but she felt terrible on it and stopped taking it   Latest Reference Range & Units 11/04/23 09:34   TSH 0.380 - 5.330 uIU/mL 0.245 (L)   Free T-4 0.93 - 1.70 ng/dL 1.41   (L): Data is abnormally low     Malignant Neoplasm of Upper-Outer Quadrant of Right Breast in Female, Estrogen Receptor Positive (Hcc)    Complete mastectomy in 2013 with Dr. Garg.    Follows with [facility]  No longer taking medications  Mammograms and US yearly     Overweight (Bmi 25.0-29.9)    BMI 29.79         Ckd (Chronic Kidney Disease) Stage 3, Gfr 30-59 Ml/Min (Hcc)    History of decreased GFR  Recent labs show GFR at 69  Is currently taking olmesartan with cardiology for kidney protection with HTN   Latest Reference Range & Units 11/27/23 08:26   GFR (CKD-EPI) >60 mL/min/1.73 m 2 69      Chronic Fatigue    Continues to note daytime fatigue  States she has been  mouth breathing due to history of hypertrophic turbinates      History of Breast Cancer    History of   R. Mastectomy at 63 y/o, in 2013.  Dr. Garg surgeon  Dr. horn oncologist, followed annually.  No chemo / radiation.   Anastrozole causes her joint pain  Switched to Letrozole- finished at 9 years           Essential Hypertension    Currently taking Olmesartan 20 mg nightly, metoprolol 25mg daily   Follows with cardiology- Dr. De Paz  Notes history of fluttering at times      Glaucoma    Followed by Dr. Garcia q 6 months.  Notes last pressure was stable  Not currently on medication for this      Mixed Hyperlipidemia    Continues to take pravastatin 20mg  Does not history of joint and muscle pain but thinks it is more based on medication with the breat cancer     Latest Reference Range & Units 11/04/23 09:34   Cholesterol,Tot 100 - 199 mg/dL 163   Triglycerides 0 - 149 mg/dL 112   HDL >=40 mg/dL 61   LDL <100 mg/dL 80      Latest Reference Range & Units 11/04/23 09:34   AST(SGOT) 12 - 45 U/L 22   ALT(SGPT) 2 - 50 U/L 31   Alkaline Phosphatase 30 - 99 U/L 76      Osteopenia    10/2022 Dexa FINDINGS:  The lumbar spine T score of -1.5    The proximal left femur   T score of -1.7    since 9/16/2019, there has been a 2.4% decrease in the bone mineral density of the lumbar spine and a 4.1% decrease in the bone mineral density of the proximal left femur.    Stationary bike, walking puppy  Takes calcium and vitamin D       Health Care Maintenance (Resolved)    Colonoscopy : 2020 , recall in 3 years   Mammogram : Hx Dense Breast , schedules for 06/2021    Pap: NA    Dexa bone scan  2019      Hypercalcemia (Resolved)        Review of Systems   Constitutional:  Negative for chills, fever, malaise/fatigue and weight loss.   HENT: Negative.     Eyes: Negative.    Respiratory: Negative.  Negative for shortness of breath.    Cardiovascular: Negative.  Negative for chest pain and palpitations.   Gastrointestinal: Negative.     Genitourinary: Negative.    Musculoskeletal: Negative.    Skin: Negative.    Neurological:  Negative for dizziness and headaches.   Psychiatric/Behavioral:  Negative for depression and suicidal ideas. The patient is not nervous/anxious.           Current Outpatient Medications:     olmesartan (BENICAR) 20 MG Tab, Take 1 Tablet by mouth every evening., Disp: 90 Tablet, Rfl: 3    metoprolol SR (TOPROL XL) 25 MG TABLET SR 24 HR, Take 1 Tablet by mouth every day., Disp: 100 Tablet, Rfl: 3    pravastatin (PRAVACHOL) 20 MG Tab, TAKE ONE TABLET BY MOUTH AT BEDTIME, Disp: 100 Tablet, Rfl: 0    vitamin D (CHOLECALCIFEROL) 1000 Unit (25 mcg) Tab, Take 1 Tablet by mouth every day., Disp: , Rfl:     calcium carbonate (OS-OMERO 500) 1250 MG Tab, Take 1,250 mg by mouth 2 times a day, with meals., Disp: , Rfl:     Past Medical History:   Diagnosis Date    Breast cancer (HCC) 2013    Cataract     CKD (chronic kidney disease) stage 3, GFR 30-59 ml/min ([facility])      Gallstones     Glaucoma     right eye    Hashimoto's thyroiditis 7/22/2020    Hyperlipidemia     Hypertension     Malignant neoplasm of upper-outer quadrant of right breast in female, estrogen receptor positive (HCC) 2013    Complete mastectomy in 2013 with Dr. Garg.  Had Dr. Jay who recommended Femara for total of 10 yeas.    Uncontrolled daytime somnolence 8/26/2020    Unexplained night sweats 7/10/2020       Past Surgical History:   Procedure Laterality Date    EYE SURGERY Bilateral     cataracts    HYSTERECTOMY LAPAROSCOPY  51 y/o    cervix and bilat ovaries removed. D/T- heavy bleeding    KNEE ARTHROSCOPY Right 60 y/o    MASTECTOMY Right age 62    right mastectomy , 9 years ago    TONSILLECTOMY AND ADENOIDECTOMY  5 y/o       Social History     Tobacco Use    Smoking status: Former     Current packs/day: 0.00     Average packs/day: 0.3 packs/day for 10.0 years (2.5 ttl pk-yrs)     Types: Cigarettes     Start date: 1/1/1990     Quit date: 1/1/2000     Years since  "quittin.9     Passive exposure: Past    Smokeless tobacco: Never   Vaping Use    Vaping Use: Never used   Substance Use Topics    Alcohol use: Yes     Alcohol/week: 0.6 oz     Types: 1 Glasses of wine per week     Comment: rare    Drug use: Never       Family History   Problem Relation Age of Onset    Hypertension Mother     Lung Disease Mother         smoker    Arthritis Mother         RA    Stroke Mother     Thyroid Mother     Heart Disease Father         quadriple bypass     Breast Cancer Sister     Cancer Sister         breast with mets    No Known Problems Maternal Aunt     Diabetes Maternal Uncle         type I    Colorectal Cancer Maternal Grandmother     Cancer Maternal Grandmother         Colon    Diabetes Maternal Grandfather         type II    No Known Problems Paternal Grandmother     No Known Problems Paternal Grandfather     No Known Problems Daughter     No Known Problems Daughter        Patient has no known allergies.    Allergies, past medical history, past surgical history, family history, social history reviewed and updated    Objective:     Vitals: /80   Pulse 87   Temp 36.6 °C (97.8 °F) (Temporal)   Resp 14   Ht 1.651 m (5' 5\")   Wt 81.2 kg (179 lb)   LMP 2002   SpO2 94%   BMI 29.79 kg/m²     Physical Exam  Constitutional:       Appearance: Normal appearance. She is normal weight.   HENT:      Head: Normocephalic and atraumatic.      Right Ear: Tympanic membrane, ear canal and external ear normal.      Left Ear: Tympanic membrane, ear canal and external ear normal.      Nose: Nose normal.      Mouth/Throat:      Mouth: Mucous membranes are moist.      Pharynx: Oropharynx is clear.   Eyes:      Extraocular Movements: Extraocular movements intact.      Conjunctiva/sclera: Conjunctivae normal.      Pupils: Pupils are equal, round, and reactive to light.   Cardiovascular:      Rate and Rhythm: Normal rate and regular rhythm.      Pulses: Normal pulses.      Heart sounds: " Normal heart sounds.   Pulmonary:      Effort: Pulmonary effort is normal.      Breath sounds: Normal breath sounds.   Abdominal:      General: Abdomen is flat. Bowel sounds are normal. There is no distension.      Palpations: Abdomen is soft. There is no mass.      Tenderness: There is no abdominal tenderness. There is no guarding or rebound.      Hernia: No hernia is present.   Musculoskeletal:         General: Normal range of motion.      Cervical back: Normal range of motion and neck supple. No tenderness.   Lymphadenopathy:      Cervical: No cervical adenopathy.   Skin:     General: Skin is warm and dry.      Capillary Refill: Capillary refill takes less than 2 seconds.   Neurological:      General: No focal deficit present.      Mental Status: She is alert and oriented to person, place, and time.      Deep Tendon Reflexes: Reflexes normal.   Psychiatric:         Mood and Affect: Mood normal.         Behavior: Behavior normal.         Thought Content: Thought content normal.         Judgment: Judgment normal.         Assessment/Plan:   1. Need for vaccination  - Influenza Vaccine, High Dose (65+ Only)    2. Diplopia  Chronic and stable condition  Continue to follow with neurology    3. Degenerative disease of nervous system, unspecified (HCC)  Chronic and stable condition  Continue to follow with neurology    4. Chronic fatigue  Chronic and stable condition    5. Chronic pansinusitis  Chronic and stable condition  Continue to follow with Dr. Wells-ENT    6. Stage 3a chronic kidney disease (HCC)  Chronic and stable condition  Continue to monitor GFR    7. Essential hypertension  Chronic and stable condition  Continue to follow with cardiology  Continue with olmesartan and metoprolol    8. Open-angle glaucoma of right eye, unspecified glaucoma stage, unspecified open-angle glaucoma type  Chronic and stable condition  Continue to follow with Dr. Roblero and Dr. Garcia    9. Hashimoto's thyroiditis  Chronic and  stable condition  Continue monitor labs    10. Mixed hyperlipidemia  Chronic and stable condition  Continue to follow cardiology  Continue with pravastatin    11. History of breast cancer  Chronic and stable condition  Continue with mammograms    12. Malignant neoplasm of upper-outer quadrant of right breast in female, estrogen receptor positive (HCC)  Chronic and stable condition  Continue mammograms  Due next year    13. Non-toxic multinodular goiter  Chronic stable condition  Continue monitor labs    14. Osteopenia of multiple sites  Chronic and stable condition  Continue with vitamin D and calcium  Discussed exercise with weightbearing exercises    15. Overweight (BMI 25.0-29.9)  Chronic and stable condition  Continue monitor     HCC Gap Form    Diagnosis to address: G31.9 - Degenerative disease of nervous system, unspecified (HCC)  Assessment and plan: Chronic, stable, as based on today's assessment and impact on other conditions evaluated today. Continue with current treatment plan: following with Neurology- Dr. Cowan- being worked up for Myasthenia Gravis  Follow-up with specialist as directed, but at least annually.  Last edited 11/29/23 17:41 PST by Silke Camilo, ALEXANDRE.[email or web address].N.          Discussed with patient possible alternative diagnoses, patient is to take all medications as prescribed.     If symptoms persist FU w/PCP, if symptoms worsen go to emergency room.     If experiencing any side effects from prescribed medications reports to the office immediately or go to emergency room.    Reviewed indication, dosage, usage and potential adverse effects of prescribed medications.     Reviewed risks and benefits of treatment plan. Patient verbalizes understanding of all instruction and verbally agrees to plan.    Discussed plan with the patient, and she agrees to the above.      I personally reviewed prior external notes and test results pertinent to today's visit.        Return for Annual wellness  visit.      Please note that this dictation was created using voice recognition software. I have made every reasonable attempt to correct obvious errors, but I expect that there may be errors of grammar and possibly content that I did not discover before finalizing the note.

## 2023-12-24 ENCOUNTER — HOSPITAL ENCOUNTER (OUTPATIENT)
Dept: RADIOLOGY | Facility: MEDICAL CENTER | Age: 73
End: 2023-12-24
Attending: PSYCHIATRY & NEUROLOGY
Payer: MEDICARE

## 2023-12-24 DIAGNOSIS — H53.2 DIPLOPIA: ICD-10-CM

## 2023-12-24 PROCEDURE — 70553 MRI BRAIN STEM W/O & W/DYE: CPT

## 2023-12-24 PROCEDURE — 700117 HCHG RX CONTRAST REV CODE 255: Performed by: PSYCHIATRY & NEUROLOGY

## 2023-12-24 PROCEDURE — A9579 GAD-BASE MR CONTRAST NOS,1ML: HCPCS | Performed by: PSYCHIATRY & NEUROLOGY

## 2023-12-24 RX ADMIN — GADOTERIDOL 17 ML: 279.3 INJECTION, SOLUTION INTRAVENOUS at 09:19

## 2024-01-24 ENCOUNTER — APPOINTMENT (OUTPATIENT)
Dept: CARDIOLOGY | Facility: PHYSICIAN GROUP | Age: 74
End: 2024-01-24
Payer: MEDICARE

## 2024-02-08 DIAGNOSIS — E78.5 HYPERLIPIDEMIA, UNSPECIFIED HYPERLIPIDEMIA TYPE: ICD-10-CM

## 2024-02-09 RX ORDER — PRAVASTATIN SODIUM 20 MG
TABLET ORAL
Qty: 100 TABLET | Refills: 0 | Status: SHIPPED | OUTPATIENT
Start: 2024-02-09

## 2024-02-09 NOTE — TELEPHONE ENCOUNTER
Received request via: Pharmacy    Was the patient seen in the last year in this department? Yes    Does the patient have an active prescription (recently filled or refills available) for medication(s) requested? No    [facility]: [facility]    Does the patient have detention Plus and need 100 day supply (blood pressure, diabetes and cholesterol meds only)? Yes, quantity updated to 100 days

## 2024-03-12 ENCOUNTER — TELEPHONE (OUTPATIENT)
Dept: HEALTH INFORMATION MANAGEMENT | Facility: OTHER | Age: 74
End: 2024-03-12

## 2024-03-13 ENCOUNTER — OFFICE VISIT (OUTPATIENT)
Dept: CARDIOLOGY | Facility: PHYSICIAN GROUP | Age: 74
End: 2024-03-13
Payer: MEDICARE

## 2024-03-13 VITALS
OXYGEN SATURATION: 94 % | HEART RATE: 81 BPM | BODY MASS INDEX: 29.32 KG/M2 | RESPIRATION RATE: 15 BRPM | SYSTOLIC BLOOD PRESSURE: 118 MMHG | WEIGHT: 176 LBS | HEIGHT: 65 IN | DIASTOLIC BLOOD PRESSURE: 74 MMHG

## 2024-03-13 DIAGNOSIS — Z85.3 HISTORY OF BREAST CANCER: ICD-10-CM

## 2024-03-13 DIAGNOSIS — I10 ESSENTIAL HYPERTENSION: ICD-10-CM

## 2024-03-13 DIAGNOSIS — Z17.0 MALIGNANT NEOPLASM OF UPPER-OUTER QUADRANT OF RIGHT BREAST IN FEMALE, ESTROGEN RECEPTOR POSITIVE (HCC): ICD-10-CM

## 2024-03-13 DIAGNOSIS — C50.411 MALIGNANT NEOPLASM OF UPPER-OUTER QUADRANT OF RIGHT BREAST IN FEMALE, ESTROGEN RECEPTOR POSITIVE (HCC): ICD-10-CM

## 2024-03-13 DIAGNOSIS — E06.3 HASHIMOTO'S THYROIDITIS: ICD-10-CM

## 2024-03-13 DIAGNOSIS — E04.2 NON-TOXIC MULTINODULAR GOITER: ICD-10-CM

## 2024-03-13 DIAGNOSIS — E78.2 MIXED HYPERLIPIDEMIA: ICD-10-CM

## 2024-03-13 DIAGNOSIS — N18.31 STAGE 3A CHRONIC KIDNEY DISEASE: ICD-10-CM

## 2024-03-13 PROCEDURE — 3078F DIAST BP <80 MM HG: CPT | Performed by: NURSE PRACTITIONER

## 2024-03-13 PROCEDURE — 99214 OFFICE O/P EST MOD 30 MIN: CPT | Performed by: NURSE PRACTITIONER

## 2024-03-13 PROCEDURE — 3074F SYST BP LT 130 MM HG: CPT | Performed by: NURSE PRACTITIONER

## 2024-03-13 ASSESSMENT — ENCOUNTER SYMPTOMS
DIZZINESS: 0
ABDOMINAL PAIN: 0
ORTHOPNEA: 0
BRUISES/BLEEDS EASILY: 0
FEVER: 0
MYALGIAS: 0
COUGH: 0
SHORTNESS OF BREATH: 0
CHILLS: 0
PALPITATIONS: 0
INSOMNIA: 0
LOSS OF CONSCIOUSNESS: 0
PND: 0
HEADACHES: 0
NAUSEA: 0

## 2024-03-13 NOTE — LETTER
[facility]  [place] [address]  Phone: [phone]  Fax: [phone]              RE:  Lauren Jiang  1950    Encounter Date: 3/13/2024      To Whom It May Concern:    Lauren is stable and is able to exercise, including chair yoga.    If you have any questions, please contact our office.    GRACE Olivier.

## 2024-03-13 NOTE — PROGRESS NOTES
Chief Complaint   Patient presents with    Follow-Up    HTN (Controlled)    Hyperlipidemia       Subjective     Lauren Jiang is a 73 y.o. female who presents today for four month follow-up for re-evaluation of BP medications.    Katie is a 73 year old female with history of hypertension, hyperlipidemia, Hashimoto's thyroiditis and history of breast cancer, previously followed by Dr. MOHAN Smith and Dr. Weeks, and last seen by me in September 2021.    In November 2023, she saw Dr. De Paz for prescription renewed. Metoprolol was changed to Toprol XL, and Amlodipine was changed to Benicar.      She is here today for four month follow-up. BP is good, and LE edema is pretty much gone.     She denies any chest pain, pressure or discomfort; no palpitations; no shortness of breath, orthopnea or PND; no dizziness or syncope.     She did see Dr. Cowan for some visual changes and MRI in December 2023 showed three small previous infarcts; there was no change in her treatment.    She does also see endocrinology for management of her thyroid; she is overdue to see Dr. Polk.    Lastly, Dr. Pierce followed her history of breast cancer.    Past Medical History:   Diagnosis Date    Breast cancer (HCC) 2013    Cataract     CKD (chronic kidney disease) stage 3, GFR 30-59 ml/min ([facility]ly [facility])      Gallstones     Glaucoma     right eye    Hashimoto's thyroiditis 7/22/2020    Hyperlipidemia     Hypertension     Malignant neoplasm of upper-outer quadrant of right breast in female, estrogen receptor positive (HCC) 2013    Complete mastectomy in 2013 with Dr. Garg.  Had Dr. Jay who recommended Femara for total of 10 yeas.    Uncontrolled daytime somnolence 8/26/2020    Unexplained night sweats 7/10/2020     Past Surgical History:   Procedure Laterality Date    EYE SURGERY Bilateral     cataracts    HYSTERECTOMY LAPAROSCOPY  53 y/o    cervix and bilat ovaries removed. D/T- heavy bleeding    KNEE ARTHROSCOPY Right 60 y/o    MASTECTOMY  Right age 62    right mastectomy , 9 years ago    TONSILLECTOMY AND ADENOIDECTOMY  5 y/o     Family History   Problem Relation Age of Onset    Hypertension Mother     Lung Disease Mother         smoker    Arthritis Mother         RA    Stroke Mother     Thyroid Mother     Heart Disease Father         quadriple bypass     Breast Cancer Sister     Cancer Sister         breast with mets    No Known Problems Maternal Aunt     Diabetes Maternal Uncle         type I    Colorectal Cancer Maternal Grandmother     Cancer Maternal Grandmother         Colon    Diabetes Maternal Grandfather         type II    No Known Problems Paternal Grandmother     No Known Problems Paternal Grandfather     No Known Problems Daughter     No Known Problems Daughter      Social History     Socioeconomic History    Marital status:      Spouse name: Not on file    Number of children: Not on file    Years of education: Not on file    Highest education level: Associate degree: occupational, technical, or vocational program   Occupational History     Comment: Retired LPN   Tobacco Use    Smoking status: Former     Current packs/day: 0.00     Average packs/day: 0.3 packs/day for 10.0 years (2.5 ttl pk-yrs)     Types: Cigarettes     Start date: 1990     Quit date: 2000     Years since quittin.2     Passive exposure: Past    Smokeless tobacco: Never   Vaping Use    Vaping Use: Never used   Substance and Sexual Activity    Alcohol use: Yes     Alcohol/week: 0.6 oz     Types: 1 Glasses of wine per week     Comment: rare    Drug use: Never    Sexual activity: Not Currently     Partners: 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: ; retired LPN   Other Topics Concern    Not on file   Social History Narrative    Not on file     Social Determinants of Health     Financial Resource Strain: Low Risk  (2023)    Overall Financial Resource Strain (CARDIA)     Difficulty of Paying Living Expenses: Not hard at all   Food Insecurity: No Food Insecurity  (11/29/2023)    Hunger Vital Sign     Worried About Running Out of Food in the Last Year: Never true     Ran Out of Food in the Last Year: Never true   Transportation Needs: No Transportation Needs (11/29/2023)    PRAPARE - Transportation     Lack of Transportation (Medical): No     Lack of Transportation (Non-Medical): No   Physical Activity: Insufficiently Active (11/29/2023)    Exercise Vital Sign     Days of Exercise per Week: 6 days     Minutes of Exercise per Session: 20 min   Stress: Stress Concern Present (11/29/2023)    [employer] - Occupational Stress Questionnaire     Feeling of Stress : To some extent   Social Connections: Unknown (11/29/2023)    Social Connection and Isolation Panel [NHANES]     Frequency of Communication with Friends and Family: Once a week     Frequency of Social Gatherings with Friends and Family: Once a week     Attends Cheondoism Services: More than 4 times per year     Active Member of Clubs or Organizations: Not on file     Attends Club or Organization Meetings: Not on file     Marital Status:    Intimate Partner Violence: Not on file   Housing Stability: Low Risk  (11/29/2023)    Housing Stability Vital Sign     Unable to Pay for Housing in the Last Year: No     Number of Places Lived in the Last Year: 1     Unstable Housing in the Last Year: No     No Known Allergies  Outpatient Encounter Medications as of 3/13/2024   Medication Sig Dispense Refill    pravastatin (PRAVACHOL) 20 MG Tab TAKE ONE TABLET BY MOUTH AT BEDTIME 100 Tablet 0    olmesartan (BENICAR) 20 MG Tab Take 1 Tablet by mouth every evening. 90 Tablet 3    metoprolol SR (TOPROL XL) 25 MG TABLET SR 24 HR Take 1 Tablet by mouth every day. 100 Tablet 3    vitamin D (CHOLECALCIFEROL) 1000 Unit (25 mcg) Tab Take 1 Tablet by mouth every day.      calcium carbonate (OS-OMERO 500) 1250 MG Tab Take 1,250 mg by mouth 2 times a day, with meals.       No facility-administered encounter  "medications on file as of 3/13/2024.     Review of Systems   Constitutional:  Negative for chills and fever.   HENT:  Negative for congestion.    Respiratory:  Negative for cough and shortness of breath.    Cardiovascular:  Negative for chest pain, palpitations, orthopnea, leg swelling and PND.   Gastrointestinal:  Negative for abdominal pain and nausea.   Musculoskeletal:  Negative for myalgias.   Skin:  Negative for rash.   Neurological:  Negative for dizziness, loss of consciousness and headaches.   Endo/Heme/Allergies:  Does not bruise/bleed easily.   Psychiatric/Behavioral:  The patient does not have insomnia.               Objective     /74 (BP Location: Left arm, Patient Position: Sitting, BP Cuff Size: Adult)   Pulse 81   Resp 15   Ht 1.651 m (5' 5\")   Wt 79.8 kg (176 lb)   LMP 01/12/2002   SpO2 94%   BMI 29.29 kg/m²     Physical Exam  Constitutional:       Appearance: She is well-developed.   HENT:      Head: Normocephalic.   Neck:      Vascular: No JVD.   Cardiovascular:      Rate and Rhythm: Normal rate and regular rhythm.      Heart sounds: Normal heart sounds.   Pulmonary:      Effort: Pulmonary effort is normal. No respiratory distress.      Breath sounds: Normal breath sounds. No wheezing or rales.   Abdominal:      General: Bowel sounds are normal. There is no distension.      Palpations: Abdomen is soft.      Tenderness: There is no abdominal tenderness.   Musculoskeletal:         General: Normal range of motion.      Cervical back: Normal range of motion and neck supple.   Skin:     General: Skin is warm and dry.      Findings: No rash.   Neurological:      Mental Status: She is alert and oriented to person, place, and time.   Psychiatric:         Mood and Affect: Mood normal.         Behavior: Behavior normal.       CONCLUSIONS OF ECHOCARDIOGRAM OF 6/22/2021:  No prior study is available for comparison.   Normal left ventricular chamber size.  Left ventricular ejection fraction is " visually estimated to be 55%.  Indeterminate diastolic function.  Mild mitral regurgitation.  Estimated right ventricular systolic pressure  is 40 mmHg.  Normal inferior vena cava size and inspiratory collapse.    CONCLUSIONS OF ETT OF 9/17/2021:  Normal treadmill stress test.  Average exercise tolerance for age.  Blunted BP response to exercise, it is possible resting BP was not assessed immediately prior.  Sinus rhythm.   No ischemic ECG changes. 0.5 mm up sloping ST depression.  No chest pain during stress.  [facility] treadmill score 4, moderate risk (assume no prior CAD).     Lab Results   Component Value Date/Time    SODIUM 143 11/27/2023 08:26 AM    POTASSIUM 4.3 11/27/2023 08:26 AM    CHLORIDE 106 11/27/2023 08:26 AM    CO2 25 11/27/2023 08:26 AM    GLUCOSE 86 11/27/2023 08:26 AM    BUN 13 11/27/2023 08:26 AM    CREATININE 0.89 11/27/2023 08:26 AM      Lab Results   Component Value Date/Time    WBC 6.8 11/19/2021 10:57 AM    RBC 5.25 11/19/2021 10:57 AM    HEMOGLOBIN 15.3 11/19/2021 10:57 AM    HEMATOCRIT 47.5 (H) 11/19/2021 10:57 AM    MCV 90.5 11/19/2021 10:57 AM    MCH 29.1 11/19/2021 10:57 AM    MCHC 32.2 (L) 11/19/2021 10:57 AM    MPV 9.7 11/19/2021 10:57 AM       Lab Results   Component Value Date/Time    ASTSGOT 22 11/04/2023 09:34 AM    ALTSGPT 31 11/04/2023 09:34 AM       Lab Results   Component Value Date/Time    CHOLSTRLTOT 163 11/04/2023 09:34 AM    LDL 80 11/04/2023 09:34 AM    HDL 61 11/04/2023 09:34 AM    TRIGLYCERIDE 112 11/04/2023 09:34 AM          Assessment & Plan     1. Essential hypertension        2. Mixed hyperlipidemia        3. History of breast cancer        4. Malignant neoplasm of upper-outer quadrant of right breast in female, estrogen receptor positive (HCC)        5. Hashimoto's thyroiditis        6. Non-toxic multinodular goiter        7. Stage 3a chronic kidney disease (HCC)            Medical Decision Making: Today's Assessment/Status/Plan:      1. Hypertension, treated with  Toprol XL 25mg once daily and Benicar 20mg once daily. BP is well controlled.    2. Hyperlipidemia, treated with Pravachol 20mg. Lipids are well controlled.    3. History of breast cancer, followed by oncology.    4. History of Hashimoto's thyroiditis, followed by endocrinology. Recent TSH was slightly low.    5. Visual changes, with small infarcts on brain MRI in December 2023, followed by neurology. She remains on statin.    6. CKD, stage 3a, with recent normal GFR. Will monitor over time.    BP and lipids are well controlled. Same medications for now. Keep follow-up with other providers; follow-up with us in 1 year, sooner if clinical condition changes.    HCC Gap Form    Diagnosis to address: N18.31 - Stage 3a chronic kidney disease (HCC)  Assessment and plan: Chronic, improving. Continue with current defined treatment plan: Last GFR was normal.  Follow-up at least annually.  Last edited 03/13/24 09:44 PDT by CHIDI Olivier

## 2024-05-19 DIAGNOSIS — E78.5 HYPERLIPIDEMIA, UNSPECIFIED HYPERLIPIDEMIA TYPE: ICD-10-CM

## 2024-05-20 NOTE — TELEPHONE ENCOUNTER
Received request via: Patient    Was the patient seen in the last year in this department? Yes    Does the patient have an active prescription (recently filled or refills available) for medication(s) requested? No    [facility]    Does the patient have [facility] and need 100 day supply (blood pressure, diabetes and cholesterol meds only)? Yes, quantity updated to 100 days

## 2024-05-21 RX ORDER — PRAVASTATIN SODIUM 20 MG
TABLET ORAL
Qty: 100 TABLET | Refills: 0 | Status: SHIPPED | OUTPATIENT
Start: 2024-05-21

## 2024-06-23 ENCOUNTER — HOSPITAL ENCOUNTER (EMERGENCY)
Facility: MEDICAL CENTER | Age: 74
End: 2024-06-23
Attending: EMERGENCY MEDICINE
Payer: MEDICARE

## 2024-06-23 ENCOUNTER — APPOINTMENT (OUTPATIENT)
Dept: RADIOLOGY | Facility: MEDICAL CENTER | Age: 74
End: 2024-06-23
Attending: EMERGENCY MEDICINE
Payer: MEDICARE

## 2024-06-23 VITALS
RESPIRATION RATE: 16 BRPM | SYSTOLIC BLOOD PRESSURE: 148 MMHG | OXYGEN SATURATION: 92 % | HEART RATE: 89 BPM | DIASTOLIC BLOOD PRESSURE: 70 MMHG | TEMPERATURE: 98.4 F | WEIGHT: 178.57 LBS | HEIGHT: 65 IN | BODY MASS INDEX: 29.75 KG/M2

## 2024-06-23 DIAGNOSIS — S63.502A SPRAIN OF LEFT WRIST, INITIAL ENCOUNTER: ICD-10-CM

## 2024-06-23 DIAGNOSIS — S00.83XA CONTUSION OF FACE, INITIAL ENCOUNTER: ICD-10-CM
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
## 2024-06-23 DIAGNOSIS — S63.637A SPRAIN OF INTERPHALANGEAL JOINT OF LEFT LITTLE FINGER, INITIAL ENCOUNTER: ICD-10-CM

## 2024-06-23 PROCEDURE — 70450 CT HEAD/BRAIN W/O DYE: CPT

## 2024-06-23 PROCEDURE — 71101 X-RAY EXAM UNILAT RIBS/CHEST: CPT | Mod: LT

## 2024-06-23 PROCEDURE — 99284 EMERGENCY DEPT VISIT MOD MDM: CPT

## 2024-06-23 PROCEDURE — 70486 CT MAXILLOFACIAL W/O DYE: CPT

## 2024-06-23 PROCEDURE — A9270 NON-COVERED ITEM OR SERVICE: HCPCS | Performed by: EMERGENCY MEDICINE

## 2024-06-23 PROCEDURE — 73110 X-RAY EXAM OF WRIST: CPT | Mod: LT

## 2024-06-23 PROCEDURE — 73140 X-RAY EXAM OF FINGER(S): CPT | Mod: LT

## 2024-06-23 PROCEDURE — 700102 HCHG RX REV CODE 250 W/ 637 OVERRIDE(OP): Performed by: EMERGENCY MEDICINE

## 2024-06-23 RX ORDER — ACETAMINOPHEN 325 MG/1
650 TABLET ORAL ONCE
Status: COMPLETED | OUTPATIENT
Start: 2024-06-23 | End: 2024-06-23

## 2024-06-23 RX ADMIN — ACETAMINOPHEN 650 MG: 325 TABLET ORAL at 07:57

## 2024-06-23 ASSESSMENT — PAIN DESCRIPTION - PAIN TYPE: TYPE: ACUTE PAIN

## 2024-06-23 NOTE — ED PROVIDER NOTES
ED Provider Note    CHIEF COMPLAINT  Chief Complaint   Patient presents with    Fall     Trip and fell last night walking dog    Head Injury     Contusion to Left Eye  C/o HA  Denies LOC and thinners    Wrist Injury     Left    Rib Injury     Left       EXTERNAL RECORDS REVIEWED      HPI/ROS  LIMITATION TO HISTORY     OUTSIDE HISTORIAN(S):  Family  is at the bedside who says that patient fell last night.  Did not get knocked out.    Lauren Jiang is a 74 y.o. female who presents to the emergency department after mechanical ground-level fall.  She was going to take their new puppy out to go to the bathroom.  She had a trip and fall.  She landed on the left side.  Struck the left side of her face, left ribs, and landed on outstretched hand.  She has pain on the left side of the face she noticed some bruising.  She had a cut to her left lower eyelid which she cleansed and Steri-Stripped.  She has some pain in the left lateral ribs.  Pain with movement palpation and deep breath.  No shortness of breath.  She also has pain in the left hand and localizes to the wrist into the proximal phalanx of the left small finger.  No numbness, tingling, or weakness.  Denies any anticoagulants.  No loss of consciousness.  Pain is localized to the head, face, left ribs, left wrist and hand only.  No other injuries.    PAST MEDICAL HISTORY   has a past medical history of Breast cancer (HCC) (2013), Cataract, CKD (chronic kidney disease) stage 3, GFR 30-59 ml/min ( ), Gallstones, Glaucoma, Hashimoto's thyroiditis (7/22/2020), Hyperlipidemia, Hypertension, Malignant neoplasm of upper-outer quadrant of right breast in female, estrogen receptor positive (HCC) (2013), Uncontrolled daytime somnolence (8/26/2020), and Unexplained night sweats (7/10/2020).    SURGICAL HISTORY   has a past surgical history that includes tonsillectomy and adenoidectomy (5 y/o); knee arthroscopy (Right, 58 y/o); mastectomy (Right, age 62); hysterectomy  "laparoscopy (53 y/o); and eye surgery (Bilateral).    FAMILY HISTORY  Family History   Problem Relation Age of Onset    Hypertension Mother     Lung Disease Mother         smoker    Arthritis Mother         RA    Stroke Mother     Thyroid Mother     Heart Disease Father         quadriple bypass     Breast Cancer Sister     Cancer Sister         breast with mets    No Known Problems Maternal Aunt     Diabetes Maternal Uncle         type I    Colorectal Cancer Maternal Grandmother     Cancer Maternal Grandmother         Colon    Diabetes Maternal Grandfather         type II    No Known Problems Paternal Grandmother     No Known Problems Paternal Grandfather     No Known Problems Daughter     No Known Problems Daughter        SOCIAL HISTORY  Social History     Tobacco Use    Smoking status: Former     Types: Cigarettes     Passive exposure: Past    Smokeless tobacco: Never    Tobacco comments:     denies   Vaping Use    Vaping status: Never Used   Substance and Sexual Activity    Alcohol use: Yes     Alcohol/week: 0.6 oz     Types: 1 Glasses of wine per week    Drug use: Never    Sexual activity: Not Currently     Partners: 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: ; retired LPN       CURRENT MEDICATIONS  Home Medications    **Home medications have not yet been reviewed for this encounter**         ALLERGIES  No Known Allergies    PHYSICAL EXAM  VITAL SIGNS: BP (!) 187/85   Pulse 92   Temp 36.8 °C (98.2 °F) (Temporal)   Resp 16   Ht 1.651 m (5' 5\")   Wt 81 kg (178 lb 9.2 oz)   LMP 01/12/2002   SpO2 92%   BMI 29.72 kg/m²    Nursing note and vitals reviewed.  Constitutional: Well-developed and well-nourished.  Mild distress.  HENT: Head is normocephalic and patient has bruising on the left side of the face, left periorbital ecchymosis.  Small wound on the left lower lateral eyelid which has been closed with Steri-Strips.  Wound edges are well-approximated.  No evidence of infection.. Oropharynx is clear and moist without exudate " or erythema.   Eyes: Pupils are equal, round, and reactive to light. Conjunctiva are normal.   Cardiovascular: Normal rate and regular rhythm. No murmur heard. Normal radial pulses.  Pulmonary/Chest: Breath sounds normal. No wheezes or rales.  Tenderness to left lateral chest wall.  Normal pulmonary exam.  Abdominal: Soft and non-tender. No distention    Musculoskeletal: Extremities exhibit normal range of motion without edema.  Tenderness to palpation primarily about the ulnar aspect of the wrist.  No snuffbox tenderness.  There is tenderness and swelling of the small finger proximal phalanx on the left.  She has tenderness of the left lateral chest wall.  Neurological: Awake, alert and oriented to person, place, and time. No focal deficits noted.  Skin: Skin is warm and dry. No rash.   Psychiatric: Normal mood and affect. Appropriate for clinical situation      EKG/LABS  DX-FINGER(S) 2+ LEFT    (Results Pending)   DX-WRIST-COMPLETE 3+ LEFT    (Results Pending)   [id number] (WITH 1-VIEW CXR) LEFT    (Results Pending)   CT-HEAD W/O    (Results Pending)   CT-MAXILLOFACIAL W/O PLUS RECONS    (Results Pending)           RADIOLOGY/PROCEDURES   I have independently interpreted the diagnostic imaging associated with this visit and am waiting the final reading from the radiologist.     My preliminary interpretation is as follows: X-rays demonstrate no evidence of finger fracture, no evidence of wrist fracture, no evidence of displaced rib fracture or pneumothorax.  CT scan of face shows no evidence of facial fracture.  CT head shows no intracranial hemorrhage.    Radiologist interpretation:  DX-FINGER(S) 2+ LEFT   Final Result      No acute process.      DX-WRIST-COMPLETE 3+ LEFT   Final Result      No acute injury identified.      CT-MAXILLOFACIAL W/O PLUS RECONS   Final Result      No acute injury identified.      CT-HEAD W/O   Final Result      No acute process.               [id number] (WITH 1-VIEW CXR)  LEFT   Final Result      No fracture or acute process seen.          COURSE & MEDICAL DECISION MAKING    ASSESSMENT, COURSE AND PLAN  Care Narrative: The patient presents today after mechanical ground-level fall.  Imaging reveals no evidence of acute traumatic injury.  Patient likely has a sprain of the wrist and finger.  No tenderness at the snuffbox to make me concerned for scaphoid injury.  CT scan showed no intracranial hemorrhage or evidence of acute traumatic injury.    The patient is treated with Tylenol for pain control.          DISPOSITION AND DISCUSSIONS  I have discussed management of the patient with the following physicians and TAMIR's: None  Barriers to care at this time, including but not limited to:  None .     Decision tools and prescription drugs considered including, but not limited to: Pain Medications   . I considered prescribing narcotic pain medication, however I feel pain should be adequately controlled with over the counter NSAIDs.    The patient will return for new or worsening symptoms and is stable at the time of discharge.    The patient is referred to a primary physician for blood pressure management, diabetic screening, and for all other preventative health concerns.      DISPOSITION:  Patient will be discharged home in stable condition.    FOLLOW UP:  JUVENAL CondeRRaphaelN.  [address] [id number] [phone]    Schedule an appointment as soon as possible for a visit       [facility], Emergency Dept  [address]  [facility] [address] [phone]    If symptoms worsen      OUTPATIENT MEDICATIONS:  New Prescriptions    No medications on file         FINAL DIAGNOSIS  1. Fall, initial encounter    2. Contusion of face, initial encounter    3. Sprain of left wrist, initial encounter    4. Sprain of interphalangeal joint of left little finger, initial encounter           Electronically signed by: Osiel Izquierdo M.D.,  6/23/2024 8:46 AM

## 2024-06-23 NOTE — ED NOTES
Patient fell while letting dog out last night. Landed on pavement and hit left side of head. No LOC, denies use of blood thinners. Complains of headache, tender left rib and wrist pain.

## 2024-06-23 NOTE — ED TRIAGE NOTES
"Chief Complaint   Patient presents with    Fall     Trip and fell last night walking dog    Head Injury     Contusion to Left Eye  C/o HA  Denies LOC and thinners    Wrist Injury     Left    Rib Injury     Left     BP (!) 187/85   Pulse 92   Temp 36.8 °C (98.2 °F) (Temporal)   Resp 16   Ht 1.651 m (5' 5\")   Wt 81 kg (178 lb 9.2 oz)   LMP 01/12/2002   SpO2 92%   BMI 29.72 kg/m²     Pt ambulated to ED w/ visitor for c/o HA, Left Eye pain, wrist and rib pain s/p trip and fall last night.     "

## 2024-07-16 ENCOUNTER — HOSPITAL ENCOUNTER (OUTPATIENT)
Dept: LAB | Facility: MEDICAL CENTER | Age: 74
End: 2024-07-16
Attending: NURSE PRACTITIONER
Payer: MEDICARE

## 2024-07-16 ENCOUNTER — HOSPITAL ENCOUNTER (OUTPATIENT)
Dept: RADIOLOGY | Facility: MEDICAL CENTER | Age: 74
End: 2024-07-16
Attending: INTERNAL MEDICINE
Payer: MEDICARE

## 2024-07-16 ENCOUNTER — OFFICE VISIT (OUTPATIENT)
Dept: MEDICAL GROUP | Facility: PHYSICIAN GROUP | Age: 74
End: 2024-07-16
Payer: MEDICARE

## 2024-07-16 VITALS
HEART RATE: 65 BPM | DIASTOLIC BLOOD PRESSURE: 88 MMHG | HEIGHT: 65 IN | WEIGHT: 177 LBS | BODY MASS INDEX: 29.49 KG/M2 | RESPIRATION RATE: 18 BRPM | SYSTOLIC BLOOD PRESSURE: 130 MMHG | OXYGEN SATURATION: 94 % | TEMPERATURE: 96.3 F

## 2024-07-16 DIAGNOSIS — R92.2 INCONCLUSIVE MAMMOGRAPHY DUE TO DENSE BREASTS: ICD-10-CM
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
## 2024-07-16 DIAGNOSIS — E06.3 HASHIMOTO'S THYROIDITIS: ICD-10-CM

## 2024-07-16 DIAGNOSIS — Z12.31 VISIT FOR SCREENING MAMMOGRAM: ICD-10-CM
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
## 2024-07-16 DIAGNOSIS — R92.30 INCONCLUSIVE MAMMOGRAPHY DUE TO DENSE BREASTS: ICD-10-CM

## 2024-07-16 PROBLEM — Z17.0 MALIGNANT NEOPLASM OF UPPER-OUTER QUADRANT OF RIGHT BREAST IN FEMALE, ESTROGEN RECEPTOR POSITIVE (HCC): Status: RESOLVED | Noted: 2020-07-10 | Resolved: 2024-07-16

## 2024-07-16 PROBLEM — C50.411 MALIGNANT NEOPLASM OF UPPER-OUTER QUADRANT OF RIGHT BREAST IN FEMALE, ESTROGEN RECEPTOR POSITIVE (HCC): Status: RESOLVED | Noted: 2020-07-10 | Resolved: 2024-07-16

## 2024-07-16 LAB
T4 FREE SERPL-MCNC: 1.37 NG/DL (ref 0.93–1.7)
TSH SERPL-ACNC: 0.67 UIU/ML (ref 0.35–5.5)

## 2024-07-16 PROCEDURE — 76641 ULTRASOUND BREAST COMPLETE: CPT

## 2024-07-16 PROCEDURE — 99214 OFFICE O/P EST MOD 30 MIN: CPT | Performed by: NURSE PRACTITIONER

## 2024-07-16 PROCEDURE — 77063 BREAST TOMOSYNTHESIS BI: CPT | Mod: 52

## 2024-07-16 PROCEDURE — 3079F DIAST BP 80-89 MM HG: CPT | Performed by: NURSE PRACTITIONER

## 2024-07-16 PROCEDURE — 36415 COLL VENOUS BLD VENIPUNCTURE: CPT

## 2024-07-16 PROCEDURE — 84439 ASSAY OF FREE THYROXINE: CPT

## 2024-07-16 PROCEDURE — 3075F SYST BP GE 130 - 139MM HG: CPT | Performed by: NURSE PRACTITIONER

## 2024-07-16 PROCEDURE — 84443 ASSAY THYROID STIM HORMONE: CPT

## 2024-07-16 ASSESSMENT — ENCOUNTER SYMPTOMS
WEIGHT LOSS: 0
SENSORY CHANGE: 1
MYALGIAS: 0
WHEEZING: 0
HEMOPTYSIS: 0
CHILLS: 0
INSOMNIA: 0
BLURRED VISION: 0
SEIZURES: 0
DIAPHORESIS: 0
DIZZINESS: 0
HEADACHES: 0
COUGH: 0
PALPITATIONS: 0
WEAKNESS: 0
DEPRESSION: 0
TINGLING: 0
LOSS OF CONSCIOUSNESS: 0
DOUBLE VISION: 0
ORTHOPNEA: 0
BACK PAIN: 0
FALLS: 1
SPUTUM PRODUCTION: 0
CLAUDICATION: 0
FOCAL WEAKNESS: 0
EYE PAIN: 0
SPEECH CHANGE: 0
FEVER: 0
SHORTNESS OF BREATH: 0
TREMORS: 0
NECK PAIN: 0
NERVOUS/ANXIOUS: 0

## 2024-07-16 ASSESSMENT — PATIENT HEALTH QUESTIONNAIRE - PHQ9: CLINICAL INTERPRETATION OF PHQ2 SCORE: 0

## 2024-07-17 ENCOUNTER — DOCUMENTATION (OUTPATIENT)
Dept: HEALTH INFORMATION MANAGEMENT | Facility: OTHER | Age: 74
End: 2024-07-17
Payer: MEDICARE

## 2024-08-26 DIAGNOSIS — E78.5 HYPERLIPIDEMIA, UNSPECIFIED HYPERLIPIDEMIA TYPE: ICD-10-CM

## 2024-08-26 NOTE — TELEPHONE ENCOUNTER
Received request via: Patient    Was the patient seen in the last year in this department? Yes    Does the patient have an active prescription (recently filled or refills available) for medication(s) requested? No    [facility]    Does the patient have [facility] and need 100-day supply? (This applies to ALL medications) Yes, quantity updated to 100 days

## 2024-08-27 RX ORDER — PRAVASTATIN SODIUM 20 MG
TABLET ORAL
Qty: 100 TABLET | Refills: 0 | Status: SHIPPED | OUTPATIENT
Start: 2024-08-27

## 2024-10-23 ENCOUNTER — DOCUMENTATION (OUTPATIENT)
Dept: HEALTH INFORMATION MANAGEMENT | Facility: OTHER | Age: 74
End: 2024-10-23
Payer: MEDICARE

## 2024-11-12 DIAGNOSIS — I10 ESSENTIAL HYPERTENSION: ICD-10-CM

## 2024-11-13 RX ORDER — OLMESARTAN MEDOXOMIL 20 MG/1
20 TABLET ORAL NIGHTLY
Qty: 90 TABLET | Refills: 0 | Status: SHIPPED | OUTPATIENT
Start: 2024-11-13 | End: 2024-11-27 | Stop reason: SDUPTHER

## 2024-11-13 NOTE — TELEPHONE ENCOUNTER
Is the patient due for a refill? Yes    Was the patient seen the past year? Yes    Date of last office visit: 3/13/2024    Does the patient have an upcoming appointment?  Yes   If yes, When? 11/27/2024    Provider to refill:AB    Does the patient have [facility] and need 100-day supply? (This applies to ALL medications) Yes, quantity updated to 100 days

## 2024-11-27 ENCOUNTER — OFFICE VISIT (OUTPATIENT)
Dept: CARDIOLOGY | Facility: PHYSICIAN GROUP | Age: 74
End: 2024-11-27
Payer: MEDICARE

## 2024-11-27 ENCOUNTER — NON-PROVIDER VISIT (OUTPATIENT)
Dept: MEDICAL GROUP | Facility: PHYSICIAN GROUP | Age: 74
End: 2024-11-27
Payer: MEDICARE

## 2024-11-27 VITALS
OXYGEN SATURATION: 93 % | BODY MASS INDEX: 29.75 KG/M2 | HEART RATE: 101 BPM | RESPIRATION RATE: 16 BRPM | HEIGHT: 65 IN | SYSTOLIC BLOOD PRESSURE: 120 MMHG | WEIGHT: 178.57 LBS | DIASTOLIC BLOOD PRESSURE: 80 MMHG

## 2024-11-27 DIAGNOSIS — E78.2 MIXED HYPERLIPIDEMIA: ICD-10-CM

## 2024-11-27 DIAGNOSIS — N18.31 STAGE 3A CHRONIC KIDNEY DISEASE: ICD-10-CM

## 2024-11-27 DIAGNOSIS — C50.411 MALIGNANT NEOPLASM OF UPPER-OUTER QUADRANT OF RIGHT BREAST IN FEMALE, ESTROGEN RECEPTOR POSITIVE (HCC): ICD-10-CM

## 2024-11-27 DIAGNOSIS — I10 ESSENTIAL HYPERTENSION: ICD-10-CM

## 2024-11-27 DIAGNOSIS — Z17.0 MALIGNANT NEOPLASM OF UPPER-OUTER QUADRANT OF RIGHT BREAST IN FEMALE, ESTROGEN RECEPTOR POSITIVE (HCC): ICD-10-CM

## 2024-11-27 DIAGNOSIS — E78.5 HYPERLIPIDEMIA, UNSPECIFIED HYPERLIPIDEMIA TYPE: ICD-10-CM

## 2024-11-27 DIAGNOSIS — E04.2 NON-TOXIC MULTINODULAR GOITER: ICD-10-CM

## 2024-11-27 DIAGNOSIS — Z23 NEED FOR VACCINATION: ICD-10-CM

## 2024-11-27 DIAGNOSIS — Z85.3 HISTORY OF BREAST CANCER: ICD-10-CM

## 2024-11-27 DIAGNOSIS — E06.3 HASHIMOTO'S THYROIDITIS: ICD-10-CM

## 2024-11-27 RX ORDER — OLMESARTAN MEDOXOMIL 20 MG/1
20 TABLET ORAL NIGHTLY
Qty: 100 TABLET | Refills: 3 | Status: SHIPPED | OUTPATIENT
Start: 2024-11-27

## 2024-11-27 RX ORDER — METOPROLOL SUCCINATE 25 MG/1
25 TABLET, EXTENDED RELEASE ORAL DAILY
Qty: 100 TABLET | Refills: 3 | Status: SHIPPED | OUTPATIENT
Start: 2024-11-27

## 2024-11-27 RX ORDER — PRAVASTATIN SODIUM 20 MG
TABLET ORAL
Qty: 100 TABLET | Refills: 3 | Status: SHIPPED | OUTPATIENT
Start: 2024-11-27

## 2024-11-27 ASSESSMENT — ENCOUNTER SYMPTOMS
LIGHT-HEADEDNESS: 0
WEIGHT GAIN: 1
DIZZINESS: 0
DYSPNEA ON EXERTION: 0
SYNCOPE: 0
SHORTNESS OF BREATH: 0
PALPITATIONS: 0
HEADACHES: 0

## 2024-11-27 NOTE — PROGRESS NOTES
Chief Complaint   Patient presents with    Hypertension          Subjective:     Lauren Jiang is a 74 y.o. female who presents today for follow-up for blood pressure. Their last clinic visit was March 2024 with Agnes MedinaRaphael Wilson is a 74 year old female with history of hypertension, hyperlipidemia, Hashimoto's thyroiditis and history of breast cancer, previously followed by Dr. MOHAN Smith and Dr. Weeks.     In November 2023, she saw Dr. De Paz for prescription renewed. Metoprolol was changed to Toprol XL, and Amlodipine was changed to Benicar. Her blood pressure is controlled in office today. She complained of dizziness when she wakes up in the morning, but describes it as an inner ear issue - she was thinking it could have been related to her blood pressure and was curious if she could lessen the doses of cardiac medications she is taking. She is open to seeing an ENT in the future.     She saw Dr. Cowan for some visual changes and MRI in December 2023 showed three small previous infarcts; there was no change in her treatment.     She saw endocrinology for management of her thyroid. She is no longer seeing Dr. Morton's office for management. She is interested in potentially establishing with another provider.     Dr. Pierce follows her history of breast cancer and has a follow up with them June 2025.    She broke her 5th metatarsal Sept 2024, and it has healed, but her gait has changed. She starts physical therapy in December and is looking forward to it. She is exercising 3 times per week for an hour at a time at the [employer]. She also walks her dog twice per day and uses a stationary bike often. She would like to lose more weight. She explains she mostly eats a plant based diet, but still drinks wine, and more snot want to give that up.    She denies any chest pain, pressure or discomfort; no palpitations; no shortness of breath, orthopnea or PND; no dizziness or syncope.     Cardiovascular Risk  Factors:  1. Smoking status: Former smoker  2. Type II Diabetes Mellitus: No   Lab Results   Component Value Date/Time    HBA1C 5.5 11/04/2023 09:34 AM     3. Hypertension: Yes  4. Dyslipidemia: Yes   Cholesterol,Tot   Date Value Ref Range Status   11/04/2023 163 100 - 199 mg/dL Final     LDL   Date Value Ref Range Status   11/04/2023 80 <100 mg/dL Final     HDL   Date Value Ref Range Status   11/04/2023 61 >=40 mg/dL Final     Triglycerides   Date Value Ref Range Status   11/04/2023 112 0 - 149 mg/dL Final     Past Medical History:   Diagnosis Date    Breast cancer (HCC) 2013    Cataract     CKD (chronic kidney disease) stage 3, GFR 30-59 ml/min      Gallstones     Glaucoma     right eye    Hashimoto's thyroiditis 07/22/2020    Hyperlipidemia     Hypertension     Malignant neoplasm of upper-outer quadrant of right breast in female, estrogen receptor positive (HCC) 2013    Complete mastectomy in 2013 with Dr. Garg.  Had Dr. Jay who recommended Femara for total of 10 yeas.    Malignant neoplasm of upper-outer quadrant of right breast in female, estrogen receptor positive (HCC) 07/10/2020    Complete mastectomy in 2013 with Dr. Garg.    [facility]s with [facility]  No longer taking medications  Mammograms and US yearly    Uncontrolled daytime somnolence 08/26/2020    Unexplained night sweats 07/10/2020       Family History   Problem Relation Age of Onset    Hypertension Mother     Lung Disease Mother         smoker    Arthritis Mother         RA    Stroke Mother     Thyroid Mother     Heart Disease Father         quadriple bypass     Breast Cancer Sister     Cancer Sister         breast with mets    No Known Problems Maternal Aunt     Diabetes Maternal Uncle         type I    Colorectal Cancer Maternal Grandmother     Cancer Maternal Grandmother         Colon    Diabetes Maternal Grandfather         type II    No Known Problems Paternal Grandmother     No Known Problems Paternal Grandfather     No Known Problems  "Daughter     No Known Problems Daughter        Social History     Tobacco Use    Smoking status: Former     Types: Cigarettes     Passive exposure: Past    Smokeless tobacco: Never    Tobacco comments:     denies   Vaping Use    Vaping status: Never Used   Substance Use Topics    Alcohol use: Yes     Alcohol/week: 0.6 oz     Types: 1 Glasses of wine per week    Drug use: Never         No Known Allergies      Current Outpatient Medications   Medication Sig    metoprolol SR (TOPROL XL) 25 MG TABLET SR 24 HR Take 1 Tablet by mouth every day.    olmesartan (BENICAR) 20 MG Tab Take 1 Tablet by mouth every evening.    pravastatin (PRAVACHOL) 20 MG Tab TAKE ONE TABLET BY MOUTH AT BEDTIME    vitamin D (CHOLECALCIFEROL) 1000 Unit (25 mcg) Tab Take 1 Tablet by mouth every day.    calcium carbonate (OS-OMERO 500) 1250 MG Tab Take 1,250 mg by mouth 2 times a day, with meals.       Review of Systems   Constitutional: Positive for weight gain. Negative for malaise/fatigue.   Cardiovascular:  Negative for chest pain, dyspnea on exertion, leg swelling, palpitations and syncope.   Respiratory:  Negative for shortness of breath.    Neurological:  Negative for dizziness, headaches and light-headedness.           Objective:   /80 (BP Location: Left arm, Patient Position: Sitting, BP Cuff Size: Adult)   Pulse (!) 101   Resp 16   Ht 1.651 m (5' 5\")   Wt 81 kg (178 lb 9.2 oz)   SpO2 93%  Body mass index is 29.72 kg/m².         Physical Exam  Vitals reviewed.   Constitutional:       General: She is not in acute distress.     Appearance: Normal appearance.   HENT:      Head: Normocephalic and atraumatic.   Cardiovascular:      Rate and Rhythm: Normal rate and regular rhythm.      Pulses: Normal pulses.      Heart sounds: Normal heart sounds.   Pulmonary:      Effort: No respiratory distress.   Abdominal:      Palpations: Abdomen is soft.   Musculoskeletal:      Right lower leg: No edema.      Left lower leg: No edema.   Skin:     " General: Skin is warm and dry.   Neurological:      General: No focal deficit present.      Mental Status: She is alert and oriented to person, place, and time.   Psychiatric:         Mood and Affect: Mood normal.         Behavior: Behavior normal.             Lab Results   Component Value Date/Time    SODIUM 143 11/27/2023 08:26 AM    POTASSIUM 4.3 11/27/2023 08:26 AM    CHLORIDE 106 11/27/2023 08:26 AM    CO2 25 11/27/2023 08:26 AM    GLUCOSE 86 11/27/2023 08:26 AM    BUN 13 11/27/2023 08:26 AM    CREATININE 0.89 11/27/2023 08:26 AM      Lab Results   Component Value Date/Time    WBC 6.8 11/19/2021 10:57 AM    RBC 5.25 11/19/2021 10:57 AM    HEMOGLOBIN 15.3 11/19/2021 10:57 AM    HEMATOCRIT 47.5 (H) 11/19/2021 10:57 AM    MCV 90.5 11/19/2021 10:57 AM    MCH 29.1 11/19/2021 10:57 AM    MCHC 32.2 (L) 11/19/2021 10:57 AM    MPV 9.7 11/19/2021 10:57 AM    NEUTSPOLYS 69.00 11/19/2021 10:57 AM    LYMPHOCYTES 15.90 (L) 11/19/2021 10:57 AM    MONOCYTES 11.20 11/19/2021 10:57 AM    EOSINOPHILS 2.70 11/19/2021 10:57 AM    BASOPHILS 0.90 11/19/2021 10:57 AM      Lab Results   Component Value Date/Time    CHOLSTRLTOT 163 11/04/2023 09:34 AM    LDL 80 11/04/2023 09:34 AM    HDL 61 11/04/2023 09:34 AM    TRIGLYCERIDE 112 11/04/2023 09:34 AM     Assessment:   1. Essential hypertension  - Comp Metabolic Panel; Future  - HEMOGLOBIN A1C; Future  - metoprolol SR (TOPROL XL) 25 MG TABLET SR 24 HR; Take 1 Tablet by mouth every day.  Dispense: 100 Tablet; Refill: 3  - olmesartan (BENICAR) 20 MG Tab; Take 1 Tablet by mouth every evening.  Dispense: 100 Tablet; Refill: 3    2. Mixed hyperlipidemia  - HEMOGLOBIN A1C; Future  - Lipid Profile; Future    3. History of breast cancer    4. Malignant neoplasm of upper-outer quadrant of right breast in female, estrogen receptor positive (HCC)    5. Hashimoto's thyroiditis  - TSH; Future  - FREE THYROXINE; Future    6. Non-toxic multinodular goiter    7. Stage 3a chronic kidney disease    8.  Hyperlipidemia, unspecified hyperlipidemia type  - pravastatin (PRAVACHOL) 20 MG Tab; TAKE ONE TABLET BY MOUTH AT BEDTIME  Dispense: 100 Tablet; Refill: 3        Medical Decision Making:  Today's Assessment / Plan:      1. Hypertension, treated with Toprol XL 25mg once daily and Benicar 20mg once daily. BP is well controlled. Medications refilled.      2. Hyperlipidemia, treated with Pravachol 20mg. Lipids are well controlled.     3. History of breast cancer, followed by oncology.     4. History of Hashimoto's thyroiditis, not currently being followed by endocrinology.      5. Visual changes, with small infarcts on brain MRI in December 2023, followed by neurology. She remains on statin.     6. CKD, stage 3a, with recent normal GFR. Will monitor over time.     BP and lipids are well controlled. Same medications for now. Basic labs ordered. Due to see her PCP. Keep follow-up with other providers; follow-up with us in 1 year, sooner if clinical condition changes.    Return in about 1 year (around 11/27/2025) for with Agnes Medina.  Sooner if problems.    Ciara Finn D.N.P.

## 2024-11-27 NOTE — PROGRESS NOTES
"Lauren Jiang is a 74 y.o. female here for a non-provider visit for:   FLU    Reason for immunization: Annual Flu Vaccine  Immunization records indicate need for vaccine: Yes, confirmed with Epic  Minimum interval has been met for this vaccine: Yes  ABN completed: Not Indicated    VIS Dated  8/6/2021 was given to patient: Yes  All IAC Questionnaire questions were answered \"No.\"    Patient tolerated injection and no adverse effects were observed or reported: Yes    Pt scheduled for next dose in series: No    "

## 2025-01-17 ENCOUNTER — HOSPITAL ENCOUNTER (OUTPATIENT)
Dept: LAB | Facility: MEDICAL CENTER | Age: 75
End: 2025-01-17
Attending: NURSE PRACTITIONER
Payer: MEDICARE

## 2025-01-17 DIAGNOSIS — E78.2 MIXED HYPERLIPIDEMIA: ICD-10-CM

## 2025-01-17 DIAGNOSIS — I10 ESSENTIAL HYPERTENSION: ICD-10-CM

## 2025-01-17 DIAGNOSIS — E06.3 HASHIMOTO'S THYROIDITIS: ICD-10-CM

## 2025-01-17 LAB
ALBUMIN SERPL BCP-MCNC: 4.2 G/DL (ref 3.2–4.9)
ALBUMIN/GLOB SERPL: 1.6 G/DL
ALP SERPL-CCNC: 56 U/L (ref 30–99)
ALT SERPL-CCNC: 34 U/L (ref 2–50)
ANION GAP SERPL CALC-SCNC: 8 MMOL/L (ref 7–16)
AST SERPL-CCNC: 27 U/L (ref 12–45)
BILIRUB SERPL-MCNC: 0.4 MG/DL (ref 0.1–1.5)
BUN SERPL-MCNC: 22 MG/DL (ref 8–22)
CALCIUM ALBUM COR SERPL-MCNC: 9.6 MG/DL (ref 8.5–10.5)
CALCIUM SERPL-MCNC: 9.8 MG/DL (ref 8.5–10.5)
CHLORIDE SERPL-SCNC: 108 MMOL/L (ref 96–112)
CHOLEST SERPL-MCNC: 168 MG/DL (ref 100–199)
CO2 SERPL-SCNC: 26 MMOL/L (ref 20–33)
CREAT SERPL-MCNC: 1.11 MG/DL (ref 0.5–1.4)
EST. AVERAGE GLUCOSE BLD GHB EST-MCNC: 108 MG/DL
GFR SERPLBLD CREATININE-BSD FMLA CKD-EPI: 52 ML/MIN/1.73 M 2
GLOBULIN SER CALC-MCNC: 2.7 G/DL (ref 1.9–3.5)
GLUCOSE SERPL-MCNC: 95 MG/DL (ref 65–99)
HBA1C MFR BLD: 5.4 % (ref 4–5.6)
HDLC SERPL-MCNC: 63 MG/DL
LDLC SERPL CALC-MCNC: 82 MG/DL
POTASSIUM SERPL-SCNC: 4.5 MMOL/L (ref 3.6–5.5)
PROT SERPL-MCNC: 6.9 G/DL (ref 6–8.2)
SODIUM SERPL-SCNC: 142 MMOL/L (ref 135–145)
T4 FREE SERPL-MCNC: 1.21 NG/DL (ref 0.93–1.7)
TRIGL SERPL-MCNC: 114 MG/DL (ref 0–149)
TSH SERPL-ACNC: 0.18 UIU/ML (ref 0.35–5.5)

## 2025-01-17 PROCEDURE — 83036 HEMOGLOBIN GLYCOSYLATED A1C: CPT

## 2025-01-17 PROCEDURE — 84439 ASSAY OF FREE THYROXINE: CPT

## 2025-01-17 PROCEDURE — 36415 COLL VENOUS BLD VENIPUNCTURE: CPT

## 2025-01-17 PROCEDURE — 84443 ASSAY THYROID STIM HORMONE: CPT

## 2025-01-17 PROCEDURE — 80053 COMPREHEN METABOLIC PANEL: CPT

## 2025-01-17 PROCEDURE — 80061 LIPID PANEL: CPT

## 2025-02-07 DIAGNOSIS — I10 ESSENTIAL HYPERTENSION: ICD-10-CM

## 2025-02-07 RX ORDER — OLMESARTAN MEDOXOMIL 20 MG/1
20 TABLET ORAL NIGHTLY
Qty: 100 TABLET | Refills: 3 | OUTPATIENT
Start: 2025-02-07

## 2025-02-07 NOTE — TELEPHONE ENCOUNTER
Refill requested too soon. Pt has active prescription from 11/27/2024 for 100 tablets and 3 refills.

## 2025-02-12 DIAGNOSIS — I10 ESSENTIAL HYPERTENSION: ICD-10-CM

## 2025-02-12 RX ORDER — OLMESARTAN MEDOXOMIL 20 MG/1
20 TABLET ORAL NIGHTLY
Qty: 100 TABLET | Refills: 3 | Status: SHIPPED | OUTPATIENT
Start: 2025-02-12

## 2025-02-12 NOTE — TELEPHONE ENCOUNTER
Is the patient due for a refill? Yes    Was the patient seen the last 15 months? Yes    Date of last office visit: 11.27.2024  Does the patient have an upcoming appointment?  No       Provider to refill:AB    Does the patient have [facility] and need 100-day supply? (This applies to ALL medications) Yes, quantity updated to 100 days

## 2025-02-19 ENCOUNTER — TELEPHONE (OUTPATIENT)
Dept: CARDIOLOGY | Facility: MEDICAL CENTER | Age: 75
End: 2025-02-19
Payer: MEDICARE

## 2025-07-23 ENCOUNTER — HOSPITAL ENCOUNTER (OUTPATIENT)
Dept: RADIOLOGY | Facility: MEDICAL CENTER | Age: 75
End: 2025-07-23
Attending: INTERNAL MEDICINE
Payer: MEDICARE

## 2025-07-23 DIAGNOSIS — R92.2 INCONCLUSIVE MAMMOGRAPHY DUE TO DENSE BREASTS: ICD-10-CM

## 2025-07-23 DIAGNOSIS — C50.911 PRIMARY MALIGNANT NEOPLASM OF RIGHT FEMALE BREAST (HCC): ICD-10-CM

## 2025-07-23 DIAGNOSIS — R92.30 INCONCLUSIVE MAMMOGRAPHY DUE TO DENSE BREASTS: ICD-10-CM

## 2025-07-23 DIAGNOSIS — Z12.31 ENCOUNTER FOR SCREENING MAMMOGRAM FOR MALIGNANT NEOPLASM OF BREAST: ICD-10-CM

## 2025-07-23 DIAGNOSIS — R91.8 OTHER NONSPECIFIC ABNORMAL FINDING OF LUNG FIELD: ICD-10-CM

## 2025-07-23 DIAGNOSIS — Z79.811 LONG TERM CURRENT USE OF AROMATASE INHIBITOR: ICD-10-CM

## 2025-07-23 PROCEDURE — 77063 BREAST TOMOSYNTHESIS BI: CPT

## 2025-07-23 PROCEDURE — 77063 BREAST TOMOSYNTHESIS BI: CPT | Mod: 52

## 2025-07-23 PROCEDURE — 76641 ULTRASOUND BREAST COMPLETE: CPT
